# Patient Record
Sex: FEMALE | Race: WHITE | NOT HISPANIC OR LATINO | Employment: OTHER | URBAN - METROPOLITAN AREA
[De-identification: names, ages, dates, MRNs, and addresses within clinical notes are randomized per-mention and may not be internally consistent; named-entity substitution may affect disease eponyms.]

---

## 2017-10-19 DIAGNOSIS — Z12.31 ENCOUNTER FOR SCREENING MAMMOGRAM FOR MALIGNANT NEOPLASM OF BREAST: ICD-10-CM

## 2017-10-25 ENCOUNTER — HOSPITAL ENCOUNTER (OUTPATIENT)
Dept: MAMMOGRAPHY | Facility: HOSPITAL | Age: 62
Discharge: HOME | End: 2017-10-25
Attending: OBSTETRICS & GYNECOLOGY
Payer: COMMERCIAL

## 2017-10-25 DIAGNOSIS — Z12.31 ENCOUNTER FOR SCREENING MAMMOGRAM FOR MALIGNANT NEOPLASM OF BREAST: ICD-10-CM

## 2017-10-25 PROCEDURE — G0202 SCR MAMMO BI INCL CAD: HCPCS

## 2018-10-02 DIAGNOSIS — Z12.31 SCREENING MAMMOGRAM, ENCOUNTER FOR: Primary | ICD-10-CM

## 2018-12-13 ENCOUNTER — HOSPITAL ENCOUNTER (OUTPATIENT)
Dept: MAMMOGRAPHY | Facility: HOSPITAL | Age: 63
Discharge: HOME | End: 2018-12-13
Attending: OBSTETRICS & GYNECOLOGY
Payer: COMMERCIAL

## 2018-12-13 VITALS — HEIGHT: 61 IN | BODY MASS INDEX: 35.87 KG/M2 | WEIGHT: 190 LBS

## 2018-12-13 DIAGNOSIS — Z12.31 SCREENING MAMMOGRAM, ENCOUNTER FOR: ICD-10-CM

## 2018-12-13 PROCEDURE — 77067 SCR MAMMO BI INCL CAD: CPT

## 2019-08-12 ENCOUNTER — TELEPHONE (OUTPATIENT)
Dept: OBGYN CLINIC | Facility: CLINIC | Age: 64
End: 2019-08-12

## 2019-08-12 NOTE — TELEPHONE ENCOUNTER
They are usually w the doc  If you can schedule one w them when I am here and block my schedule I would be ok doing one w the doc, then on my own  I haven't done them in the office before, but am somewhat more confident in my suturing skills now if she needed a stitch or two

## 2019-08-13 NOTE — TELEPHONE ENCOUNTER
I rescheduled her for Monday 9/23 with you at 11 and held a spot in Dr Tripathi schedule at [phone]

## 2019-09-23 ENCOUNTER — OFFICE VISIT (OUTPATIENT)
Dept: OBGYN CLINIC | Facility: CLINIC | Age: 64
End: 2019-09-23
Payer: COMMERCIAL

## 2019-09-23 VITALS
WEIGHT: 193 LBS | SYSTOLIC BLOOD PRESSURE: 122 MMHG | BODY MASS INDEX: 36.44 KG/M2 | HEIGHT: 61 IN | DIASTOLIC BLOOD PRESSURE: 80 MMHG

## 2019-09-23 DIAGNOSIS — E28.39 HYPOESTROGENISM: ICD-10-CM

## 2019-09-23 DIAGNOSIS — Z12.39 BREAST CANCER SCREENING: ICD-10-CM

## 2019-09-23 DIAGNOSIS — Z01.419 WELL WOMAN EXAM: Primary | ICD-10-CM

## 2019-09-23 PROCEDURE — 99386 PREV VISIT NEW AGE 40-64: CPT | Performed by: PHYSICIAN ASSISTANT

## 2019-09-23 NOTE — PROGRESS NOTES
Assessment/Plan   Diagnoses and all orders for this visit:    Well woman exam  -     GP Liquid-Based Pap + HPV Plus    Hypoestrogenism  -     DXA bone density spine hip and pelvis; Future    Breast cancer screening  -     Mammo screening bilateral w 3d & cad; Future        Discussion    All questions have been answered to her satisfaction  RTO for APE or sooner if needed      Subjective     Pt for annual GYN exam  S/p hysterectomy  w BSO  Pt doing well without any menopausal sx  Denies hot flashes or  compalints  Pt does have 2 skin tags that she has had as long as she can remember that dont usually cause her any issues  A few weeks ago one of them was sore so she made an appt to have them removed but the irritation has since resolved and no longer symptomatic  Pt in monogamous relationship w partner  She recently retired a few years back and is enjoying life and travel  Toyin Quezada is a 58 y o  female who presents for annual well woman exam    LMP - years ago after her hysterectomy  No vulvar itch/burn; No vaginal itch/burn; No abn discharge or odor; No urinary sx - burning/pain/frequency/hematuria  (+) SBEs - no breast masses, asymmetry, nipple discharge or bleeding, changes in skin of breast, or breast tenderness bilaterally  No abd/pelvic pain or HAs; No menopausal symptoms: No hot flashes/night sweats, problems with intercourse, vaginal dryness; sleeping well  Pt is sexually active in a mutually monog/ sexual relationship; No issues with intercourse; She declines std/hiv/hep testing; Feels safe at home    (+) PCP for routine Bw/care- Dr Lyly Yu sees as PCP    Last Pap - 2015ish  History of abnormal Pap smear: none   Last mammo - 12/13/18 WNL  History of abnormal mammogram: none   Last colonoscopy - 2009  Last Dexa scan -2014     Review of Systems   Constitutional: Negative  Respiratory: Positive for wheezing (c/w asthma per pt)      Gastrointestinal: Positive for diarrhea (due to crohns)  Endocrine: Negative  Genitourinary: Negative          The following portions of the patient's history were reviewed and updated as appropriate: allergies, current medications, past family history, past medical history, past social history, past surgical history and problem list          OB History        1    Para   0    Term   0            AB   1    Living           SAB   1    TAB        Ectopic        Multiple        Live Births                     Past Medical History:   Diagnosis Date    Asthma     Crohn's disease ([facility] [address] )        Past Surgical History:   Procedure Laterality Date    HYSTERECTOMY      OOPHORECTOMY         Family History   Problem Relation Age of Onset    Coronary artery disease Mother     Diabetes Father     Pancreatic cancer Sister        Social History     Socioeconomic History    Marital status: /Civil Union     Spouse name: Not on file    Number of children: Not on file    Years of education: Not on file    Highest education level: Not on file   Occupational History    Not on file   Social Needs    Financial resource strain: Not on file    Food insecurity:     Worry: Not on file     Inability: Not on file    Transportation needs:     Medical: Not on file     Non-medical: Not on file   Tobacco Use    Smoking status: Never Smoker    Smokeless tobacco: Never Used   Substance and Sexual Activity    Alcohol use: Not Currently    Drug use: Not Currently    Sexual activity: Yes     Partners: Male     Birth control/protection: Post-menopausal   Lifestyle    Physical activity:     Days per week: Not on file     Minutes per session: Not on file    Stress: Not on file   Relationships    Social connections:     Talks on phone: Not on file     Gets together: Not on file     Attends Rastafarian service: Not on file     Active member of club or organization: Not on file     Attends meetings of clubs or organizations: Not on file     Relationship status: Not on file    Intimate partner violence:     Fear of current or ex partner: Not on file     Emotionally abused: Not on file     Physically abused: Not on file     Forced sexual activity: Not on file   Other Topics Concern    Not on file   Social History Narrative    Not on file       No current outpatient medications on file  Allergies not on file    Objective   Vitals:    09/23/19 1113   BP: 122/80   BP Location: Left arm   Patient Position: Sitting   Cuff Size: Standard   Weight: 87 5 kg (193 lb)   Height: 5' 1" (1 549 m)     Physical Exam   Constitutional: She is oriented to person, place, and time  She appears well-developed and well-nourished  HENT:   Head: Normocephalic and atraumatic  Cardiovascular: Normal rate and regular rhythm  Pulmonary/Chest: Effort normal and breath sounds normal  Right breast exhibits no inverted nipple, no mass, no nipple discharge, no skin change and no tenderness  Left breast exhibits no inverted nipple, no mass, no nipple discharge, no skin change and no tenderness  Breasts are symmetrical    Abdominal: Soft  She exhibits no distension and no mass  There is no rebound and no guarding  Genitourinary: Vagina normal and uterus normal        No vaginal discharge found  Neurological: She is alert and oriented to person, place, and time  Skin: Skin is warm and dry  Psychiatric: She has a normal mood and affect  Patient Instructions   Will watch skin tags for now  As it is no longer bothersome and is usually not will likely not plan removal at this point but rather just watch  Aware to call if desires removal    Pap done today  Mammo and DEXA ordered

## 2019-09-27 LAB
HPV HR 12 DNA CVX QL NAA+PROBE: NOT DETECTED
HPV16 DNA SPEC QL NAA+PROBE: NOT DETECTED
HPV18 DNA SPEC QL NAA+PROBE: NOT DETECTED
THIN PREP CVX: NORMAL

## 2019-10-15 ENCOUNTER — TELEPHONE (OUTPATIENT)
Dept: OBGYN CLINIC | Facility: CLINIC | Age: 64
End: 2019-10-15

## 2019-10-15 NOTE — TELEPHONE ENCOUNTER
Patient called letting us know we billed to the wrong insurance ID number  Patient has [facility]: 9/23/19 was billed to ID# [id number]  That visit should have been billed to the ID# [id number]  On the visit it looks like the new card was scanned into the system but not updated in coverage

## 2019-10-16 NOTE — TELEPHONE ENCOUNTER
An e-mail has been sent to [facility] for evaluation the the outstanding bill the patient received in the mail due to non matching ID numbers of active  insurance at time of the survive

## 2020-01-16 ENCOUNTER — TRANSCRIBE ORDERS (OUTPATIENT)
Dept: LAB | Facility: CLINIC | Age: 65
End: 2020-01-16

## 2020-01-16 ENCOUNTER — APPOINTMENT (OUTPATIENT)
Dept: LAB | Facility: CLINIC | Age: 65
End: 2020-01-16
Payer: COMMERCIAL

## 2020-01-16 DIAGNOSIS — E78.2 MIXED HYPERLIPIDEMIA: ICD-10-CM

## 2020-01-16 DIAGNOSIS — E78.2 MIXED HYPERLIPIDEMIA: Primary | ICD-10-CM

## 2020-01-16 LAB
ALBUMIN SERPL BCP-MCNC: 3.1 G/DL (ref 3.5–5)
ALP SERPL-CCNC: 137 U/L (ref 46–116)
ALT SERPL W P-5'-P-CCNC: 11 U/L (ref 12–78)
ANION GAP SERPL CALCULATED.3IONS-SCNC: 8 MMOL/L (ref 4–13)
AST SERPL W P-5'-P-CCNC: 14 U/L (ref 5–45)
BASOPHILS # BLD AUTO: 0.07 THOUSANDS/ΜL (ref 0–0.1)
BASOPHILS NFR BLD AUTO: 1 % (ref 0–1)
BILIRUB SERPL-MCNC: 0.36 MG/DL (ref 0.2–1)
BUN SERPL-MCNC: 10 MG/DL (ref 5–25)
CALCIUM SERPL-MCNC: 8.8 MG/DL (ref 8.3–10.1)
CHLORIDE SERPL-SCNC: 111 MMOL/L (ref 100–108)
CHOLEST SERPL-MCNC: 226 MG/DL (ref 50–200)
CO2 SERPL-SCNC: 25 MMOL/L (ref 21–32)
CREAT SERPL-MCNC: 0.65 MG/DL (ref 0.6–1.3)
EOSINOPHIL # BLD AUTO: 0.26 THOUSAND/ΜL (ref 0–0.61)
EOSINOPHIL NFR BLD AUTO: 3 % (ref 0–6)
ERYTHROCYTE [DISTWIDTH] IN BLOOD BY AUTOMATED COUNT: 14.5 % (ref 11.6–15.1)
GFR SERPL CREATININE-BSD FRML MDRD: 93 ML/MIN/1.73SQ M
GLUCOSE P FAST SERPL-MCNC: 85 MG/DL (ref 65–99)
HCT VFR BLD AUTO: 42.1 % (ref 34.8–46.1)
HDLC SERPL-MCNC: 46 MG/DL
HGB BLD-MCNC: 12.8 G/DL (ref 11.5–15.4)
IMM GRANULOCYTES # BLD AUTO: 0.02 THOUSAND/UL (ref 0–0.2)
IMM GRANULOCYTES NFR BLD AUTO: 0 % (ref 0–2)
LDLC SERPL CALC-MCNC: 148 MG/DL (ref 0–100)
LYMPHOCYTES # BLD AUTO: 3.64 THOUSANDS/ΜL (ref 0.6–4.47)
LYMPHOCYTES NFR BLD AUTO: 47 % (ref 14–44)
MCH RBC QN AUTO: 25.8 PG (ref 26.8–34.3)
MCHC RBC AUTO-ENTMCNC: 30.4 G/DL (ref 31.4–37.4)
MCV RBC AUTO: 85 FL (ref 82–98)
MONOCYTES # BLD AUTO: 0.58 THOUSAND/ΜL (ref 0.17–1.22)
MONOCYTES NFR BLD AUTO: 8 % (ref 4–12)
NEUTROPHILS # BLD AUTO: 3.19 THOUSANDS/ΜL (ref 1.85–7.62)
NEUTS SEG NFR BLD AUTO: 41 % (ref 43–75)
NONHDLC SERPL-MCNC: 180 MG/DL
NRBC BLD AUTO-RTO: 0 /100 WBCS
PLATELET # BLD AUTO: 355 THOUSANDS/UL (ref 149–390)
PMV BLD AUTO: 10 FL (ref 8.9–12.7)
POTASSIUM SERPL-SCNC: 3.8 MMOL/L (ref 3.5–5.3)
PROT SERPL-MCNC: 7.5 G/DL (ref 6.4–8.2)
RBC # BLD AUTO: 4.97 MILLION/UL (ref 3.81–5.12)
SODIUM SERPL-SCNC: 144 MMOL/L (ref 136–145)
T4 FREE SERPL-MCNC: 0.97 NG/DL (ref 0.76–1.46)
TRIGL SERPL-MCNC: 159 MG/DL
TSH SERPL DL<=0.05 MIU/L-ACNC: 3.9 UIU/ML (ref 0.36–3.74)
WBC # BLD AUTO: 7.76 THOUSAND/UL (ref 4.31–10.16)

## 2020-01-16 PROCEDURE — 80053 COMPREHEN METABOLIC PANEL: CPT

## 2020-01-16 PROCEDURE — 84443 ASSAY THYROID STIM HORMONE: CPT

## 2020-01-16 PROCEDURE — 80061 LIPID PANEL: CPT

## 2020-01-16 PROCEDURE — 84439 ASSAY OF FREE THYROXINE: CPT

## 2020-01-16 PROCEDURE — 36415 COLL VENOUS BLD VENIPUNCTURE: CPT

## 2020-01-16 PROCEDURE — 85025 COMPLETE CBC W/AUTO DIFF WBC: CPT

## 2020-02-11 ENCOUNTER — HOSPITAL ENCOUNTER (OUTPATIENT)
Dept: RADIOLOGY | Age: 65
Discharge: HOME | End: 2020-02-11
Payer: COMMERCIAL

## 2020-02-11 VITALS — HEIGHT: 61 IN | BODY MASS INDEX: 35.87 KG/M2 | WEIGHT: 190 LBS

## 2020-02-11 DIAGNOSIS — Z12.39 BREAST CANCER SCREENING: ICD-10-CM

## 2020-02-11 DIAGNOSIS — E28.39 HYPOESTROGENISM: ICD-10-CM

## 2020-02-11 PROCEDURE — 77067 SCR MAMMO BI INCL CAD: CPT

## 2020-02-11 PROCEDURE — 77080 DXA BONE DENSITY AXIAL: CPT

## 2020-02-11 PROCEDURE — 77063 BREAST TOMOSYNTHESIS BI: CPT

## 2020-02-25 PROBLEM — Z87.19 HISTORY OF COLITIS: Status: ACTIVE | Noted: 2020-02-25

## 2020-02-25 PROBLEM — K29.60 REFLUX GASTRITIS: Status: ACTIVE | Noted: 2020-02-25

## 2020-02-25 PROBLEM — Z12.11 ENCOUNTER FOR SCREENING COLONOSCOPY: Status: ACTIVE | Noted: 2020-02-25

## 2020-08-26 ENCOUNTER — ANESTHESIA EVENT (OUTPATIENT)
Dept: GASTROENTEROLOGY | Facility: AMBULARY SURGERY CENTER | Age: 65
End: 2020-08-26

## 2020-09-09 ENCOUNTER — ANESTHESIA (OUTPATIENT)
Dept: GASTROENTEROLOGY | Facility: AMBULARY SURGERY CENTER | Age: 65
End: 2020-09-09

## 2020-09-09 ENCOUNTER — HOSPITAL ENCOUNTER (OUTPATIENT)
Dept: GASTROENTEROLOGY | Facility: AMBULARY SURGERY CENTER | Age: 65
Setting detail: OUTPATIENT SURGERY
Discharge: HOME | End: 2020-09-09
Attending: COLON & RECTAL SURGERY | Admitting: COLON & RECTAL SURGERY
Payer: COMMERCIAL

## 2020-09-09 VITALS
RESPIRATION RATE: 18 BRPM | SYSTOLIC BLOOD PRESSURE: 124 MMHG | WEIGHT: 192 LBS | OXYGEN SATURATION: 95 % | HEART RATE: 64 BPM | DIASTOLIC BLOOD PRESSURE: 58 MMHG | BODY MASS INDEX: 36.25 KG/M2 | HEIGHT: 61 IN | TEMPERATURE: 97.8 F

## 2020-09-09 VITALS — HEART RATE: 68 BPM

## 2020-09-09 DIAGNOSIS — K29.60 REFLUX GASTRITIS: ICD-10-CM

## 2020-09-09 DIAGNOSIS — Z12.11 ENCOUNTER FOR SCREENING COLONOSCOPY: ICD-10-CM

## 2020-09-09 DIAGNOSIS — Z87.19 HISTORY OF COLITIS: ICD-10-CM

## 2020-09-09 PROCEDURE — 45385 COLONOSCOPY W/LESION REMOVAL: CPT | Performed by: COLON & RECTAL SURGERY

## 2020-09-09 PROCEDURE — 45385 COLONOSCOPY W/LESION REMOVAL: CPT | Performed by: INTERNAL MEDICINE

## 2020-09-09 PROCEDURE — 88305 TISSUE EXAM BY PATHOLOGIST: CPT | Performed by: PATHOLOGY

## 2020-09-09 PROCEDURE — 43239 EGD BIOPSY SINGLE/MULTIPLE: CPT | Performed by: INTERNAL MEDICINE

## 2020-09-09 RX ORDER — LIDOCAINE HYDROCHLORIDE 10 MG/ML
INJECTION, SOLUTION EPIDURAL; INFILTRATION; INTRACAUDAL; PERINEURAL AS NEEDED
Status: DISCONTINUED | OUTPATIENT
Start: 2020-09-09 | End: 2020-09-09

## 2020-09-09 RX ORDER — SODIUM CHLORIDE, SODIUM LACTATE, POTASSIUM CHLORIDE, CALCIUM CHLORIDE 600; 310; 30; 20 MG/100ML; MG/100ML; MG/100ML; MG/100ML
50 INJECTION, SOLUTION INTRAVENOUS CONTINUOUS
Status: DISCONTINUED | OUTPATIENT
Start: 2020-09-09 | End: 2020-09-13 | Stop reason: HOSPADM

## 2020-09-09 RX ORDER — LIDOCAINE HYDROCHLORIDE 10 MG/ML
0.5 INJECTION, SOLUTION EPIDURAL; INFILTRATION; INTRACAUDAL; PERINEURAL ONCE AS NEEDED
Status: DISCONTINUED | OUTPATIENT
Start: 2020-09-09 | End: 2020-09-13 | Stop reason: HOSPADM

## 2020-09-09 RX ORDER — PROPOFOL 10 MG/ML
INJECTION, EMULSION INTRAVENOUS CONTINUOUS PRN
Status: DISCONTINUED | OUTPATIENT
Start: 2020-09-09 | End: 2020-09-09

## 2020-09-09 RX ORDER — PROPOFOL 10 MG/ML
INJECTION, EMULSION INTRAVENOUS AS NEEDED
Status: DISCONTINUED | OUTPATIENT
Start: 2020-09-09 | End: 2020-09-09

## 2020-09-09 RX ADMIN — PROPOFOL 100 MCG/KG/MIN: 10 INJECTION, EMULSION INTRAVENOUS at 08:13

## 2020-09-09 RX ADMIN — PROPOFOL 110 MG: 10 INJECTION, EMULSION INTRAVENOUS at 08:13

## 2020-09-09 RX ADMIN — SODIUM CHLORIDE, SODIUM LACTATE, POTASSIUM CHLORIDE, AND CALCIUM CHLORIDE: .6; .31; .03; .02 INJECTION, SOLUTION INTRAVENOUS at 08:04

## 2020-09-09 RX ADMIN — LIDOCAINE HYDROCHLORIDE 50 MG: 10 INJECTION, SOLUTION EPIDURAL; INFILTRATION; INTRACAUDAL; PERINEURAL at 08:13

## 2020-09-09 NOTE — ANESTHESIA PREPROCEDURE EVALUATION
Procedure:  COLONOSCOPY  EGD    Relevant Problems   ANESTHESIA (within normal limits)      NEURO/PSYCH   (+) History of colitis      Past Medical History:   Diagnosis Date    Asthma     Colon polyp     Crohn's disease ([facility] [address] )        Physical Exam    Airway    Mallampati score: II  TM Distance: >3 FB  Neck ROM: full     Dental       Cardiovascular  Cardiovascular exam normal    Pulmonary  Pulmonary exam normal     Other Findings        Anesthesia Plan  ASA Score- 2     Anesthesia Type- IV sedation with anesthesia with ASA Monitors  Additional Monitors:   Airway Plan:     Comment: I, Brandee Yip MD, discussed risks (reviewed with patient on the anesthesia consent form), benefits and alternatives of monitored sedation including the possibility under sedation to have recall or mild discomfort          Plan Factors-    Chart reviewed  Patient summary reviewed  Induction- intravenous  Postoperative Plan-     Informed Consent- Anesthetic plan and risks discussed with patient  I personally reviewed this patient with the CRNA  Discussed and agreed on the Anesthesia Plan with the CRNA  Fariba Cruz

## 2020-09-09 NOTE — H&P
History and Physical   Colon and Rectal Surgery   Shakira Salinas 71 y o  female MRN: [id number]  Unit/Bed#:  Encounter: [id number]  09/09/20   7:56 AM      No chief complaint on file  ASSESSMENT:   Question of Crohn's disease on past history, discussed with her will plan on routine colonoscopy/EGD, but would hold additional workup unless symptoms dictate as at least a decade off mesalamine and no problems      Average risk screening colonoscopy     Screening colonoscopy,discussed in a face-to-face, personal, informed consent process, the benefits, alternatives, risks including not limited to bleeding, missed lesion, perforation requiring emergent surgery discussed/understood      PLAN:  Colonoscopy scheduled, will coordinate with EGD with GI colleagues for ongoing reflux/GERD, stomach polyp history         HPI  Shakira Salinas is a 71 y o  female  Is here today for evaluation of Crohn's disease  She is currently not taking any medications  In the past she was on asacol       Diagnosed mid 46s with Crohn's, states hospital admissions for pain, no obstructions, had been on prednisone remotely around that time, I have reviewed colonoscopy/biopsies to [address] without evidence of active IBD on endoscopy or biopsies  She had 2005 EGD last with multiple fundic polyps      She reports she has generalized abdominal cramping that is relieved with a bowel movement  She fluctuates between diarrhea and soft and formed bowel movements  She denies any fecal incontinence  She denies rectal bleeding  On average she has 3 bowel movements daily       Her most recent colonoscopy was on 4/28/14 with Dr Shimon Redmond which was essentially unremarkable, suspected lymphoid hyperplasia of the transverse colon       She denies family history of colorectal cancer    Historical Information   Past Medical History:   Diagnosis Date    Asthma     Colon polyp     Crohn's disease [facility])      Past Surgical History:   Procedure Laterality Date    COLONOSCOPY      GASTRIC BYPASS LAPAROSCOPIC      HYSTERECTOMY      OOPHORECTOMY         Meds/Allergies     (Not in a hospital admission)      No current outpatient medications on file      Current Facility-Administered Medications:     lactated ringers infusion, 50 mL/hr, Intravenous, Continuous, Birtha Siemens, MD    lidocaine (PF) (XYLOCAINE-MPF) 1 % injection 0 5 mL, 0 5 mL, Infiltration, Once PRN, Birtha Siemens, MD    Allergies   Allergen Reactions    Biaxin [Clarithromycin] Rash         Social History   Social History     Substance and Sexual Activity   Alcohol Use Not Currently     Social History     Substance and Sexual Activity   Drug Use Not Currently     Social History     Tobacco Use   Smoking Status Never Smoker   Smokeless Tobacco Never Used         Family History:   Family History   Problem Relation Age of Onset    Coronary artery disease Mother     Diabetes Father     Pancreatic cancer Sister 46    No Known Problems Maternal Grandmother     No Known Problems Maternal Grandfather     No Known Problems Paternal Grandmother     No Known Problems Paternal Grandfather     No Known Problems Sister     No Known Problems Maternal Aunt     No Known Problems Maternal Aunt     No Known Problems Maternal Aunt     No Known Problems Maternal Aunt     No Known Problems Maternal Aunt     No Known Problems Maternal Aunt     No Known Problems Paternal Aunt     No Known Problems Paternal Aunt     Breast cancer Cousin     Breast cancer Cousin 48    Breast cancer Cousin 76    Colon cancer Neg Hx          Objective     Current Vitals:   Blood Pressure: 139/75 (09/09/20 0741)  Pulse: 75 (09/09/20 0741)  Temperature: 97 5 °F (36 4 °C) (09/09/20 0741)  Temp Source: Temporal (09/09/20 0741)  Respirations: (!) 28 (09/09/20 0741)  Height: 5' 1" (154 9 cm) (09/09/20 0741)  Weight - Scale: 87 1 kg (192 lb) (09/09/20 0741)  SpO2: 99 % (09/09/20 0741)  No intake or output data in the 24 hours ending 09/09/20 8424    Physical Exam:  General:no distress  Eyes:perrla/eomi  ENT:moist mucus membranes  Neck:supple  Pulm:no increased work of breathing  CV:sinus  Abdomen:soft,nontender

## 2020-09-09 NOTE — ANESTHESIA POSTPROCEDURE EVALUATION
Post-Op Assessment Note    CV Status:  Stable    Pain management: adequate     Mental Status:  Alert and awake   Hydration Status:  Euvolemic   PONV Controlled:  Controlled   Airway Patency:  Patent      Post Op Vitals Reviewed: Yes      Staff: Anesthesiologist, CRNA         No complications documented

## 2020-12-03 ENCOUNTER — OFFICE VISIT (OUTPATIENT)
Dept: BARIATRICS | Facility: CLINIC | Age: 65
End: 2020-12-03
Payer: COMMERCIAL

## 2020-12-03 VITALS
DIASTOLIC BLOOD PRESSURE: 70 MMHG | WEIGHT: 199 LBS | TEMPERATURE: 97.2 F | BODY MASS INDEX: 36.62 KG/M2 | SYSTOLIC BLOOD PRESSURE: 138 MMHG | HEIGHT: 62 IN | HEART RATE: 85 BPM

## 2020-12-03 DIAGNOSIS — Z98.84 S/P GASTRIC BYPASS: Primary | ICD-10-CM

## 2020-12-03 DIAGNOSIS — E66.9 OBESITY, CLASS II, BMI 35-39.9: Primary | ICD-10-CM

## 2020-12-03 DIAGNOSIS — Z98.84 S/P GASTRIC BYPASS: ICD-10-CM

## 2020-12-03 DIAGNOSIS — K91.2 POSTSURGICAL MALABSORPTION: ICD-10-CM

## 2020-12-03 PROBLEM — E66.812 OBESITY, CLASS II, BMI 35-39.9: Status: ACTIVE | Noted: 2020-12-03

## 2020-12-03 PROCEDURE — 99203 OFFICE O/P NEW LOW 30 MIN: CPT | Performed by: SURGERY

## 2020-12-03 RX ORDER — OMEPRAZOLE 20 MG/1
20 CAPSULE, DELAYED RELEASE ORAL DAILY
Qty: 30 CAPSULE | Refills: 3 | Status: SHIPPED | OUTPATIENT
Start: 2020-12-03 | End: 2021-01-20

## 2020-12-03 RX ORDER — DIPHENOXYLATE HYDROCHLORIDE AND ATROPINE SULFATE 2.5; .025 MG/1; MG/1
1 TABLET ORAL DAILY
COMMUNITY

## 2020-12-14 ENCOUNTER — HOSPITAL ENCOUNTER (OUTPATIENT)
Dept: RADIOLOGY | Facility: HOSPITAL | Age: 65
Discharge: HOME | End: 2020-12-14
Payer: COMMERCIAL

## 2020-12-14 DIAGNOSIS — Z98.84 S/P GASTRIC BYPASS: ICD-10-CM

## 2020-12-14 PROCEDURE — 74240 X-RAY XM UPR GI TRC 1CNTRST: CPT

## 2021-01-07 ENCOUNTER — OFFICE VISIT (OUTPATIENT)
Dept: BARIATRICS | Facility: CLINIC | Age: 66
End: 2021-01-07
Payer: COMMERCIAL

## 2021-01-07 VITALS
SYSTOLIC BLOOD PRESSURE: 124 MMHG | TEMPERATURE: 97.7 F | BODY MASS INDEX: 36.16 KG/M2 | RESPIRATION RATE: 20 BRPM | HEIGHT: 62 IN | WEIGHT: 196.5 LBS | HEART RATE: 60 BPM | DIASTOLIC BLOOD PRESSURE: 78 MMHG

## 2021-01-07 DIAGNOSIS — R13.10 DYSPHAGIA: ICD-10-CM

## 2021-01-07 DIAGNOSIS — Z98.84 S/P GASTRIC BYPASS: Primary | ICD-10-CM

## 2021-01-07 PROCEDURE — 99214 OFFICE O/P EST MOD 30 MIN: CPT | Performed by: SURGERY

## 2021-01-07 NOTE — PROGRESS NOTES
BARIATRIC HISTORY AND PHYSICAL - BARIATRIC SURGERY  Luci Galvan 76 y o  female MRN: [id number]  Unit/Bed#:  Encounter: [id number]      HPI:  Luci Galvan is a 76 y o  female who presents to the office status post laparoscopic Doug-en-Y gastric bypass done at [facility] in 2006  Patient has been having acid reflux, food regurgitation, food stuck  She she has been on omeprazole since the last visit, she reports complete resolution of her acid reflux  She still reports food regurgitation and dysphagia, has been twice weekly  She had upper GI endoscopy back in September that was normal   She also has had an upper GI study and month ago which did show small hiatal hernia  With no evidence of obstruction  Initial weight was 230 lb,  Marcin 140 lb,  Current 196 5 lb, with BMI of 36 5    Review of Systems   Constitutional: Negative for chills and fatigue  HENT: Negative for ear pain  Eyes: Negative for pain  Respiratory: Negative for cough, shortness of breath and wheezing  Cardiovascular: Negative for chest pain  Gastrointestinal: Negative for abdominal distention and abdominal pain  Acid reflux(resolved)  , food regurgitation, food stuck   Endocrine: Negative for polydipsia  Genitourinary: Negative for flank pain  Musculoskeletal: Negative for arthralgias and back pain  Skin: Negative for pallor  Allergic/Immunologic: Negative for food allergies  Neurological: Negative for weakness and headaches  Hematological: Does not bruise/bleed easily  Psychiatric/Behavioral: Negative for confusion         Historical Information   Past Medical History:   Diagnosis Date    Asthma     Colon polyp     Crohn's disease ([facility] [address] )      Past Surgical History:   Procedure Laterality Date    COLONOSCOPY      GASTRIC BYPASS LAPAROSCOPIC      HYSTERECTOMY      OOPHORECTOMY       Social History   Social History     Substance and Sexual Activity   Alcohol Use Not Currently     Social History Substance and Sexual Activity   Drug Use Not Currently     Social History     Tobacco Use   Smoking Status Never Smoker   Smokeless Tobacco Never Used     Family History: non-contributory    Meds/Allergies   all medications and allergies reviewed  Allergies   Allergen Reactions    Biaxin [Clarithromycin] Rash       Objective     Current Vitals:   Blood Pressure: 124/78 (01/07/21 1109)  Pulse: 60 (01/07/21 1109)  Temperature: 97 7 °F (36 5 °C) (01/07/21 1109)  Temp Source: Tympanic (01/07/21 1109)  Respirations: 20 (01/07/21 1109)  Height: 5' 1 5" (156 2 cm) (01/07/21 1109)  Weight - Scale: 89 1 kg (196 lb 8 oz) (01/07/21 1109)  bowel movement  [unfilled]    Invasive Devices     None                 Physical Exam  Constitutional:       Appearance: Normal appearance  HENT:      Head: Normocephalic  Mouth/Throat:      Mouth: Mucous membranes are moist    Eyes:      Conjunctiva/sclera: Conjunctivae normal       Pupils: Pupils are equal, round, and reactive to light  Neck:      Musculoskeletal: Normal range of motion  Cardiovascular:      Rate and Rhythm: Normal rate and regular rhythm  Pulmonary:      Effort: Pulmonary effort is normal    Abdominal:      General: Abdomen is flat  There is no distension  Tenderness: There is no abdominal tenderness  Comments: All laparoscopic incisions are healing well, with no signs of infection  There are no palpable incisions hernias  Deep palpation is not painful in all 4 quadrants, and there are no peritoneal findings  Musculoskeletal: Normal range of motion  Skin:     General: Skin is warm  Capillary Refill: Capillary refill takes less than 2 seconds  Neurological:      General: No focal deficit present  Mental Status: She is alert  Psychiatric:         Mood and Affect: Mood normal          Lab Results: I have personally reviewed pertinent lab results  Imaging: I have personally reviewed pertinent reports      EKG, Pathology, and Other Studies: I have personally reviewed pertinent reports  UGI 12/14  IMPRESSION:     Status post Doug-en-Y gastric bypass surgery, with small gastric remnant at least partially above the level of the diaphragm      Esophagoesophageal and gastroesophageal reflux noted     Tertiary contractions of distal esophagus     Code Status: [unfilled]  Advance Directive and Living Will:      Power of :    POLST:      76 y o  female status post laparoscopic Doug-en-Y gastric bypass performed in 2006 at [facility] by Dr Vu Hawthorne      At her highest weight she was 230 lbs and after the surgery she went down to a saqib of 140 lbs within the first year  Over the last 7 years she has regained about 60 lbs back      She tells me that she cannot eat a lot of food and she continues to be restricted in the volume that she can have but she has been favoring high caloric foods        She recently had an endoscopy in September of this year by Dr Catie VARGAS that revealed a relatively normal gastric bypass anatomy  An upper GI did show a small hernia  Patient to to increase omeprazole to twice daily  The patient follow-up with medical weight management, dietitian   We do believe that she does not have any anatomical cause of her symptoms, she will benefit from medical management      If her symptoms persist will schedule her for upper GI endoscopy      Saida Khoury MD  Bariatric Surgery Fellow  1/7/2021  11:35 AM

## 2021-01-20 DIAGNOSIS — Z98.84 S/P GASTRIC BYPASS: ICD-10-CM

## 2021-01-20 RX ORDER — OMEPRAZOLE 20 MG/1
CAPSULE, DELAYED RELEASE ORAL
Qty: 30 CAPSULE | Refills: 3 | Status: SHIPPED | OUTPATIENT
Start: 2021-01-20 | End: 2021-05-17 | Stop reason: SDUPTHER

## 2021-02-05 ENCOUNTER — TELEPHONE (OUTPATIENT)
Dept: OBGYN CLINIC | Facility: CLINIC | Age: 66
End: 2021-02-05

## 2021-02-05 DIAGNOSIS — Z12.31 ENCOUNTER FOR SCREENING MAMMOGRAM FOR BREAST CANCER: Primary | ICD-10-CM

## 2021-02-13 DIAGNOSIS — Z23 ENCOUNTER FOR IMMUNIZATION: ICD-10-CM

## 2021-03-01 PROBLEM — K64.5 THROMBOSED EXTERNAL HEMORRHOID: Status: ACTIVE | Noted: 2021-03-01

## 2021-03-03 ENCOUNTER — IMMUNIZATIONS (OUTPATIENT)
Dept: FAMILY MEDICINE CLINIC | Facility: HOSPITAL | Age: 66
End: 2021-03-03

## 2021-03-03 DIAGNOSIS — Z23 ENCOUNTER FOR IMMUNIZATION: Primary | ICD-10-CM

## 2021-03-03 PROCEDURE — 0001A SARS-COV-2 / COVID-19 MRNA VACCINE (PFIZER-BIONTECH) 30 MCG: CPT

## 2021-03-03 PROCEDURE — 91300 SARS-COV-2 / COVID-19 MRNA VACCINE (PFIZER-BIONTECH) 30 MCG: CPT

## 2021-03-25 ENCOUNTER — IMMUNIZATIONS (OUTPATIENT)
Dept: FAMILY MEDICINE CLINIC | Facility: HOSPITAL | Age: 66
End: 2021-03-25

## 2021-03-25 DIAGNOSIS — Z23 ENCOUNTER FOR IMMUNIZATION: Primary | ICD-10-CM

## 2021-03-25 PROCEDURE — 0002A SARS-COV-2 / COVID-19 MRNA VACCINE (PFIZER-BIONTECH) 30 MCG: CPT

## 2021-03-25 PROCEDURE — 91300 SARS-COV-2 / COVID-19 MRNA VACCINE (PFIZER-BIONTECH) 30 MCG: CPT

## 2021-05-17 DIAGNOSIS — Z98.84 S/P GASTRIC BYPASS: ICD-10-CM

## 2021-05-17 RX ORDER — OMEPRAZOLE 20 MG/1
CAPSULE, DELAYED RELEASE ORAL
Qty: 90 CAPSULE | Refills: 1 | Status: SHIPPED | OUTPATIENT
Start: 2021-05-17 | End: 2021-11-07 | Stop reason: SDUPTHER

## 2021-05-27 ENCOUNTER — HOSPITAL ENCOUNTER (OUTPATIENT)
Dept: RADIOLOGY | Age: 66
Discharge: HOME | End: 2021-05-27
Payer: COMMERCIAL

## 2021-05-27 VITALS — BODY MASS INDEX: 37 KG/M2 | WEIGHT: 196 LBS | HEIGHT: 61 IN

## 2021-05-27 DIAGNOSIS — Z12.31 ENCOUNTER FOR SCREENING MAMMOGRAM FOR BREAST CANCER: ICD-10-CM

## 2021-05-27 PROCEDURE — 77063 BREAST TOMOSYNTHESIS BI: CPT

## 2021-05-27 PROCEDURE — 77067 SCR MAMMO BI INCL CAD: CPT

## 2021-11-07 DIAGNOSIS — Z98.84 S/P GASTRIC BYPASS: ICD-10-CM

## 2021-11-09 RX ORDER — OMEPRAZOLE 20 MG/1
20 CAPSULE, DELAYED RELEASE ORAL DAILY
Qty: 30 CAPSULE | Refills: 0 | Status: SHIPPED | OUTPATIENT
Start: 2021-11-09 | End: 2022-04-07

## 2021-12-16 ENCOUNTER — OFFICE VISIT (OUTPATIENT)
Dept: GASTROENTEROLOGY | Facility: CLINIC | Age: 66
End: 2021-12-16
Payer: COMMERCIAL

## 2021-12-16 ENCOUNTER — TELEPHONE (OUTPATIENT)
Dept: GASTROENTEROLOGY | Facility: CLINIC | Age: 66
End: 2021-12-16

## 2021-12-16 VITALS
SYSTOLIC BLOOD PRESSURE: 118 MMHG | DIASTOLIC BLOOD PRESSURE: 80 MMHG | HEART RATE: 74 BPM | BODY MASS INDEX: 37 KG/M2 | WEIGHT: 196 LBS | HEIGHT: 61 IN

## 2021-12-16 DIAGNOSIS — K44.9 HIATAL HERNIA: ICD-10-CM

## 2021-12-16 DIAGNOSIS — Z98.84 STATUS POST BARIATRIC SURGERY: ICD-10-CM

## 2021-12-16 DIAGNOSIS — R13.19 ESOPHAGEAL DYSPHAGIA: Primary | ICD-10-CM

## 2021-12-16 PROCEDURE — 99204 OFFICE O/P NEW MOD 45 MIN: CPT | Performed by: INTERNAL MEDICINE

## 2021-12-16 RX ORDER — ROSUVASTATIN CALCIUM 10 MG/1
TABLET, COATED ORAL
COMMUNITY
Start: 2021-11-29

## 2021-12-16 NOTE — H&P (VIEW-ONLY)
[facility] - Outpatient Consultation  Anjelica Garvin 78 y o  female MRN: [id number]  Encounter: [id number]          ASSESSMENT AND PLAN:      1  Esophageal dysphagia   patient is complaining of difficulty in swallowing  She is having trouble swallowing bread, meat and poultry  Patient is status post bariatric surgery  She had Doug-en-Y bypass surgery  She is having difficulty swallowing  She did have a barium swallow performed several months ago  I have reviewed the barium swallow images on PACS  The history is small hiatal hernia and some holdup of the barium in the lower esophagus near the EG junction  There is a possibility of a Schatzki ring or esophageal spasm  Endoscopy will be done for further evaluation and dilatation  I had a long discussion with patient regarding this  Possibility of esophageal dysmotility cannot be excluded  Disorder such as cough school esophagus or other motility problem cannot be excluded  Patient had a colonoscopy done in 2020     - EGD; Future    2  Hiatal hernia    She does have possible hiatal hernia as shown on upper GI series  Endoscopy will be done for evaluation and recommendations  3  Status post bariatric surgery    Doug-en-Y bariatric surgery as mentioned  Currently patient is having significant difficulty swallowing  Food is regurgitating back     ______________________________________________________________________    HPI:    Difficulty swallowing  Food is regurgitating back  She is having more trouble with meat and bread  Denies any significant weight loss  There is no change in bowel habits  REVIEW OF SYSTEMS:    CONSTITUTIONAL: Denies any fever, chills, rigors, and weight loss  HEENT: No earache or tinnitus  Denies hearing loss or visual disturbances  CARDIOVASCULAR: No chest pain or palpitations  RESPIRATORY: Denies any cough, hemoptysis, shortness of breath or dyspnea on exertion    GASTROINTESTINAL: As noted in the History of Present Illness  GENITOURINARY: No problems with urination  Denies any hematuria or dysuria  NEUROLOGIC: No dizziness or vertigo, denies headaches  MUSCULOSKELETAL: Denies any muscle or joint pain  SKIN: Denies skin rashes or itching  ENDOCRINE: Denies excessive thirst  Denies intolerance to heat or cold  PSYCHOSOCIAL: Denies depression or anxiety  Denies any recent memory loss         Historical Information   Past Medical History:   Diagnosis Date    Asthma     Colon polyp     Crohn's disease ([address] )      Past Surgical History:   Procedure Laterality Date    COLONOSCOPY      GASTRIC BYPASS LAPAROSCOPIC      HYSTERECTOMY Bilateral     38years old    OOPHORECTOMY Bilateral     38years old     Social History   Social History     Substance and Sexual Activity   Alcohol Use Not Currently     Social History     Substance and Sexual Activity   Drug Use Not Currently     Social History     Tobacco Use   Smoking Status Never Smoker   Smokeless Tobacco Never Used     Family History   Problem Relation Age of Onset    Coronary artery disease Mother     Diabetes Father     Pancreatic cancer Sister 46    No Known Problems Maternal Grandmother     No Known Problems Maternal Grandfather     No Known Problems Paternal Grandmother     No Known Problems Paternal Grandfather     No Known Problems Sister     No Known Problems Maternal Aunt     No Known Problems Maternal Aunt     No Known Problems Maternal Aunt     No Known Problems Maternal Aunt     No Known Problems Maternal Aunt     No Known Problems Maternal Aunt     No Known Problems Paternal Aunt     No Known Problems Paternal Aunt     Breast cancer Cousin     Breast cancer Cousin 48    Breast cancer Cousin 76    Colon cancer Neg Hx        Meds/Allergies       Current Outpatient Medications:     rosuvastatin (CRESTOR) 10 MG tablet    multivitamin (THERAGRAN) TABS    Omega-3 Fatty Acids (FISH OIL PO)    omeprazole (PriLOSEC) 20 mg delayed release capsule    Allergies   Allergen Reactions    Biaxin [Clarithromycin] Rash           Objective     Blood pressure 118/80, pulse 74, height 5' 1" (1 549 m), weight 88 9 kg (196 lb)  Body mass index is 37 03 kg/m²  PHYSICAL EXAM:      General Appearance:   Alert, cooperative, no distress   HEENT:   Normocephalic, atraumatic, anicteric      Neck:  Supple, symmetrical, trachea midline   Lungs:   Clear to auscultation bilaterally; no rales, rhonchi or wheezing; respirations unlabored    Heart[de-identified]   Regular rate and rhythm; no murmur, rub, or gallop  Abdomen:   Soft, non-tender, non-distended; normal bowel sounds; no masses, no organomegaly    Genitalia:   Deferred    Rectal:   Deferred    Extremities:  No cyanosis, clubbing or edema    Pulses:  2+ and symmetric    Skin:  No jaundice, rashes, or lesions    Lymph nodes:  No palpable cervical lymphadenopathy        Lab Results:   No visits with results within 1 Day(s) from this visit  Latest known visit with results is:   Hospital Outpatient Visit on 09/09/2020   Component Date Value    Case Report 09/09/2020                      Value:Surgical Pathology Report                         Case: [id number]                                   Authorizing Provider:  Gilson Squires MD          Collected:           09/09/2020 0816              Ordering Location:     [facility]        Received:            09/09/2020 [address] [facility] Endoscopy                                                           Pathologist:           Frances Conrad,                                                      Specimens:   A) - Polyp, Stomach/Small Intestine, Bx Gastric polyp                                               B) - Polyp, Colorectal, Cold snare ascending polypectomy                                   Final Diagnosis 09/09/2020                      Value: This result contains rich text formatting which cannot be displayed here   Note 09/09/2020                      Value: This result contains rich text formatting which cannot be displayed here   Additional Information 09/09/2020                      Value: This result contains rich text formatting which cannot be displayed here   Synoptic Checklist 09/09/2020                      Value:  (COLON/RECTUM POLYP FORM - GI - All Specimens)                                                                                                                 : Adenoma(s)     Zara Bears Description 09/09/2020                      Value: This result contains rich text formatting which cannot be displayed here   Case Report 09/09/2020                      Value:Surgical Pathology Report                         Case: [id number]                                   Authorizing Provider:  Shakir Yuan MD          Collected:           09/09/2020 0816              Ordering Location:     [facility]        Received:            09/09/2020 [address] [facility]                                                           Pathologist:           Trini Tanner DO                                                     Specimens:   A) - Polyp, Stomach/Small Intestine, Bx Gastric polyp                                               B) - Polyp, Colorectal, Cold snare ascending polypectomy                                   Final Diagnosis 09/09/2020                      Value: This result contains rich text formatting which cannot be displayed here   Note 09/09/2020                      Value: This result contains rich text formatting which cannot be displayed here   Additional Information 09/09/2020                      Value: This result contains rich text formatting which cannot be displayed here      Synoptic Checklist 09/09/2020                      Value:  (COLON/RECTUM POLYP FORM - GI - All Specimens) Radha Villa Description 09/09/2020                      Value: This result contains rich text formatting which cannot be displayed here  Radiology Results:   No results found

## 2022-01-09 ENCOUNTER — IMMUNIZATIONS (OUTPATIENT)
Dept: FAMILY MEDICINE CLINIC | Facility: HOSPITAL | Age: 67
End: 2022-01-09

## 2022-01-09 DIAGNOSIS — Z23 ENCOUNTER FOR IMMUNIZATION: Primary | ICD-10-CM

## 2022-01-09 PROCEDURE — 91300 COVID-19 PFIZER VACC 0.3 ML: CPT

## 2022-01-09 PROCEDURE — 0001A COVID-19 PFIZER VACC 0.3 ML: CPT

## 2022-01-10 ENCOUNTER — TELEPHONE (OUTPATIENT)
Dept: GASTROENTEROLOGY | Facility: HOSPITAL | Age: 67
End: 2022-01-10

## 2022-01-11 ENCOUNTER — PREP FOR PROCEDURE (OUTPATIENT)
Dept: GASTROENTEROLOGY | Facility: CLINIC | Age: 67
End: 2022-01-11

## 2022-01-11 ENCOUNTER — ANESTHESIA EVENT (OUTPATIENT)
Dept: GASTROENTEROLOGY | Facility: HOSPITAL | Age: 67
End: 2022-01-11

## 2022-01-11 ENCOUNTER — ANESTHESIA (OUTPATIENT)
Dept: GASTROENTEROLOGY | Facility: HOSPITAL | Age: 67
End: 2022-01-11

## 2022-01-11 ENCOUNTER — HOSPITAL ENCOUNTER (OUTPATIENT)
Dept: GASTROENTEROLOGY | Facility: HOSPITAL | Age: 67
Setting detail: OUTPATIENT SURGERY
Discharge: HOME | End: 2022-01-11
Attending: INTERNAL MEDICINE | Admitting: INTERNAL MEDICINE
Payer: COMMERCIAL

## 2022-01-11 VITALS
TEMPERATURE: 98.7 F | RESPIRATION RATE: 18 BRPM | HEART RATE: 75 BPM | OXYGEN SATURATION: 98 % | WEIGHT: 196.2 LBS | DIASTOLIC BLOOD PRESSURE: 56 MMHG | HEIGHT: 62 IN | BODY MASS INDEX: 36.1 KG/M2 | SYSTOLIC BLOOD PRESSURE: 110 MMHG

## 2022-01-11 DIAGNOSIS — R13.19 ESOPHAGEAL DYSPHAGIA: Primary | ICD-10-CM

## 2022-01-11 DIAGNOSIS — K22.0 ACHALASIA: ICD-10-CM

## 2022-01-11 DIAGNOSIS — R13.19 ESOPHAGEAL DYSPHAGIA: ICD-10-CM

## 2022-01-11 PROCEDURE — 88305 TISSUE EXAM BY PATHOLOGIST: CPT | Performed by: PATHOLOGY

## 2022-01-11 PROCEDURE — 43239 EGD BIOPSY SINGLE/MULTIPLE: CPT | Performed by: INTERNAL MEDICINE

## 2022-01-11 RX ORDER — SODIUM CHLORIDE, SODIUM LACTATE, POTASSIUM CHLORIDE, CALCIUM CHLORIDE 600; 310; 30; 20 MG/100ML; MG/100ML; MG/100ML; MG/100ML
INJECTION, SOLUTION INTRAVENOUS CONTINUOUS PRN
Status: DISCONTINUED | OUTPATIENT
Start: 2022-01-11 | End: 2022-01-11

## 2022-01-11 RX ORDER — PROPOFOL 10 MG/ML
INJECTION, EMULSION INTRAVENOUS AS NEEDED
Status: DISCONTINUED | OUTPATIENT
Start: 2022-01-11 | End: 2022-01-11

## 2022-01-11 RX ORDER — LIDOCAINE HYDROCHLORIDE 20 MG/ML
INJECTION, SOLUTION EPIDURAL; INFILTRATION; INTRACAUDAL; PERINEURAL AS NEEDED
Status: DISCONTINUED | OUTPATIENT
Start: 2022-01-11 | End: 2022-01-11

## 2022-01-11 RX ADMIN — PROPOFOL 150 MG: 10 INJECTION, EMULSION INTRAVENOUS at 09:27

## 2022-01-11 RX ADMIN — PROPOFOL 30 MG: 10 INJECTION, EMULSION INTRAVENOUS at 09:31

## 2022-01-11 RX ADMIN — SODIUM CHLORIDE, SODIUM LACTATE, POTASSIUM CHLORIDE, AND CALCIUM CHLORIDE: .6; .31; .03; .02 INJECTION, SOLUTION INTRAVENOUS at 08:20

## 2022-01-11 RX ADMIN — LIDOCAINE HYDROCHLORIDE 100 MG: 20 INJECTION, SOLUTION EPIDURAL; INFILTRATION; INTRACAUDAL; PERINEURAL at 09:25

## 2022-01-11 RX ADMIN — PROPOFOL 50 MG: 10 INJECTION, EMULSION INTRAVENOUS at 09:29

## 2022-01-11 NOTE — ANESTHESIA PREPROCEDURE EVALUATION
Procedure:  EGD    Relevant Problems   NEURO/PSYCH   (+) History of colitis        Physical Exam    Airway    Mallampati score: II  TM Distance: >3 FB  Neck ROM: full     Dental   No notable dental hx     Cardiovascular  Cardiovascular exam normal    Pulmonary  Pulmonary exam normal     Other Findings        Anesthesia Plan  ASA Score- 2     Anesthesia Type- IV sedation with anesthesia with ASA Monitors  Additional Monitors:   Airway Plan:           Plan Factors-Exercise tolerance (METS): >4 METS  Chart reviewed  Imaging results reviewed  Existing labs reviewed  Patient summary reviewed  Patient is not a current smoker  Induction-     Postoperative Plan-     Informed Consent- Anesthetic plan and risks discussed with patient  I personally reviewed this patient with the CRNA  Discussed and agreed on the Anesthesia Plan with the CRNA  Tony Cortes

## 2022-01-11 NOTE — ANESTHESIA POSTPROCEDURE EVALUATION
Post-Op Assessment Note    CV Status:  Stable  Pain Score: 0    Pain management: adequate     Mental Status:  Awake   Hydration Status:  Stable   PONV Controlled:  Controlled   Airway Patency:  Patent      Post Op Vitals Reviewed: Yes      Staff: CRNA         No complications documented      BP   102/54   Temp      Pulse  70   Resp   12   SpO2   98

## 2022-01-11 NOTE — DISCHARGE SUMMARY
Discharge Summary - Nita Ordaz 78 y o  female MRN: [id number]    Unit/Bed#:  Encounter: [id number]    Admission Date:  01/11/2022    Admitting Diagnosis: Esophageal dysphagia [R13 19]    HPI:  Difficulty swallowing    Procedures Performed: No orders of the defined types were placed in this encounter  Summary of Hospital Course:  Endoscopy done see report    Significant Findings, Care, Treatment and Services Provided:  Suspect high lower esophageal sphincter pressure at the EG junction    Complications:  None    Discharge Diagnosis:  See above    Medical Problems             Resolved Problems  Date Reviewed: 9/9/2020    None                Condition at Discharge: good         Discharge instructions/Information to patient and family:   See after visit summary for information provided to patient and family  Provisions for Follow-Up Care:  See after visit summary for information related to follow-up care and any pertinent home health orders        PCP: Devonte Sarkar DO    Disposition: Home

## 2022-02-11 ENCOUNTER — TELEPHONE (OUTPATIENT)
Dept: GASTROENTEROLOGY | Facility: HOSPITAL | Age: 67
End: 2022-02-11

## 2022-02-22 ENCOUNTER — PREP FOR PROCEDURE (OUTPATIENT)
Dept: GASTROENTEROLOGY | Facility: CLINIC | Age: 67
End: 2022-02-22

## 2022-02-22 ENCOUNTER — TELEPHONE (OUTPATIENT)
Dept: GASTROENTEROLOGY | Facility: CLINIC | Age: 67
End: 2022-02-22

## 2022-02-22 DIAGNOSIS — R13.19 ESOPHAGEAL DYSPHAGIA: Primary | ICD-10-CM

## 2022-02-22 NOTE — TELEPHONE ENCOUNTER
Patient confirmed she has prep instructions   Carri orellana'melinda to schedule in [facility] 3 at 8am     Scheduled date of EGD manometry (as of today): 3/25/22  Physician performing EGD manometry: Dr Reddy  Location of EGD manometry: [facility] reviewed with patient by:Addie  Clearances: N/A

## 2022-03-25 ENCOUNTER — HOSPITAL ENCOUNTER (OUTPATIENT)
Dept: GASTROENTEROLOGY | Facility: HOSPITAL | Age: 67
Discharge: HOME | End: 2022-03-25
Attending: INTERNAL MEDICINE
Payer: COMMERCIAL

## 2022-03-25 ENCOUNTER — ANESTHESIA EVENT (OUTPATIENT)
Dept: GASTROENTEROLOGY | Facility: HOSPITAL | Age: 67
End: 2022-03-25

## 2022-03-25 ENCOUNTER — ANESTHESIA (OUTPATIENT)
Dept: GASTROENTEROLOGY | Facility: HOSPITAL | Age: 67
End: 2022-03-25

## 2022-03-25 ENCOUNTER — HOSPITAL ENCOUNTER (OUTPATIENT)
Dept: GASTROENTEROLOGY | Facility: HOSPITAL | Age: 67
Setting detail: OUTPATIENT SURGERY
Discharge: HOME | End: 2022-03-25
Attending: INTERNAL MEDICINE | Admitting: INTERNAL MEDICINE
Payer: COMMERCIAL

## 2022-03-25 VITALS
OXYGEN SATURATION: 97 % | RESPIRATION RATE: 18 BRPM | TEMPERATURE: 97 F | DIASTOLIC BLOOD PRESSURE: 69 MMHG | SYSTOLIC BLOOD PRESSURE: 147 MMHG | HEART RATE: 54 BPM

## 2022-03-25 DIAGNOSIS — R13.19 ESOPHAGEAL DYSPHAGIA: ICD-10-CM

## 2022-03-25 DIAGNOSIS — K22.0 ACHALASIA: ICD-10-CM

## 2022-03-25 PROCEDURE — 43239 EGD BIOPSY SINGLE/MULTIPLE: CPT | Performed by: INTERNAL MEDICINE

## 2022-03-25 PROCEDURE — 91020 GASTRIC MOTILITY STUDIES: CPT

## 2022-03-25 PROCEDURE — 88305 TISSUE EXAM BY PATHOLOGIST: CPT | Performed by: PATHOLOGY

## 2022-03-25 RX ORDER — SODIUM CHLORIDE 9 MG/ML
INJECTION, SOLUTION INTRAVENOUS CONTINUOUS PRN
Status: DISCONTINUED | OUTPATIENT
Start: 2022-03-25 | End: 2022-03-25

## 2022-03-25 RX ORDER — PROPOFOL 10 MG/ML
INJECTION, EMULSION INTRAVENOUS AS NEEDED
Status: DISCONTINUED | OUTPATIENT
Start: 2022-03-25 | End: 2022-03-25

## 2022-03-25 RX ADMIN — PROPOFOL 30 MG: 10 INJECTION, EMULSION INTRAVENOUS at 08:23

## 2022-03-25 RX ADMIN — SODIUM CHLORIDE: 0.9 INJECTION, SOLUTION INTRAVENOUS at 08:02

## 2022-03-25 RX ADMIN — PROPOFOL 40 MG: 10 INJECTION, EMULSION INTRAVENOUS at 08:10

## 2022-03-25 RX ADMIN — PROPOFOL 30 MG: 10 INJECTION, EMULSION INTRAVENOUS at 08:20

## 2022-03-25 RX ADMIN — PROPOFOL 130 MG: 10 INJECTION, EMULSION INTRAVENOUS at 08:07

## 2022-03-25 RX ADMIN — PROPOFOL 30 MG: 10 INJECTION, EMULSION INTRAVENOUS at 08:16

## 2022-03-25 RX ADMIN — PROPOFOL 30 MG: 10 INJECTION, EMULSION INTRAVENOUS at 08:13

## 2022-03-25 NOTE — PERIOPERATIVE NURSING NOTE
Patient brought in the room and educated on procedure Patient for EGD guided manometry procedure  Please see EGD procedure note  Manometry catheter inserted via left nostril and positioned and secured under EGD guidance  Once patient awaken and ready she had  10 liquid swallow, 10 viscous swallow, 1 rapid swallow , 1 rapid drink challenge with 200 cc water Patient tolerated procedure  Catheter removed intact  Patient taken to the recovery area via stretcher and report given to [facility]   Patient will be discharge when she meets the criteria and discharge instructions will be given by Dora You RN

## 2022-03-25 NOTE — ANESTHESIA POSTPROCEDURE EVALUATION
Post-Op Assessment Note    CV Status:  Stable  Pain Score: 0    Pain management: adequate     Mental Status:  Alert and awake   Hydration Status:  Stable   PONV Controlled:  None   Airway Patency:  Patent      Post Op Vitals Reviewed: Yes      Staff: CRNA         No complications documented      BP  129/60    Temp 98   Pulse 74   Resp 17   SpO2 98

## 2022-03-25 NOTE — ANESTHESIA PREPROCEDURE EVALUATION
Procedure:  EGD    Relevant Problems   NEURO/PSYCH   (+) History of colitis        Physical Exam    Airway    Mallampati score: II  TM Distance: >3 FB  Neck ROM: full     Dental   No notable dental hx     Cardiovascular      Pulmonary      Other Findings        Anesthesia Plan  ASA Score- 2     Anesthesia Type- IV sedation with anesthesia with ASA Monitors  Additional Monitors:   Airway Plan:           Plan Factors-Exercise tolerance (METS): >4 METS  Chart reviewed  Patient summary reviewed  Patient is not a current smoker  Obstructive sleep apnea risk education given perioperatively  Induction- intravenous  Postoperative Plan-     Informed Consent- Anesthetic plan and risks discussed with patient  I personally reviewed this patient with the CRNA  Discussed and agreed on the Anesthesia Plan with the CRNA  Blu Lucas

## 2022-03-28 PROCEDURE — 91010 ESOPHAGUS MOTILITY STUDY: CPT | Performed by: INTERNAL MEDICINE

## 2022-04-04 ENCOUNTER — TELEPHONE (OUTPATIENT)
Dept: GASTROENTEROLOGY | Facility: CLINIC | Age: 67
End: 2022-04-04

## 2022-04-04 NOTE — TELEPHONE ENCOUNTER
Patient called regarding bx results  They are not back yet  Please call patient to schedule f/u appt with Dr Юлия Guzman   Thank you

## 2022-04-06 ENCOUNTER — TELEPHONE (OUTPATIENT)
Dept: GASTROENTEROLOGY | Facility: CLINIC | Age: 67
End: 2022-04-06

## 2022-04-06 NOTE — TELEPHONE ENCOUNTER
----- Message from Alex Fuentes MD sent at 4/6/2022  9:43 AM EDT -----  Call patient to report normal results

## 2022-04-07 ENCOUNTER — OFFICE VISIT (OUTPATIENT)
Dept: GASTROENTEROLOGY | Facility: CLINIC | Age: 67
End: 2022-04-07
Payer: COMMERCIAL

## 2022-04-07 VITALS
WEIGHT: 198 LBS | SYSTOLIC BLOOD PRESSURE: 111 MMHG | DIASTOLIC BLOOD PRESSURE: 66 MMHG | HEART RATE: 73 BPM | BODY MASS INDEX: 36.44 KG/M2 | HEIGHT: 62 IN

## 2022-04-07 DIAGNOSIS — R13.13 CRICOPHARYNGEAL DYSPHAGIA: ICD-10-CM

## 2022-04-07 DIAGNOSIS — K44.9 HIATAL HERNIA: Primary | ICD-10-CM

## 2022-04-07 DIAGNOSIS — Z98.84 STATUS POST BARIATRIC SURGERY: ICD-10-CM

## 2022-04-07 PROCEDURE — 99213 OFFICE O/P EST LOW 20 MIN: CPT | Performed by: INTERNAL MEDICINE

## 2022-04-07 NOTE — PROGRESS NOTES
[facility] - Outpatient Follow-up Note  Zenobia Nair 78 y o  female MRN: [id number]  Encounter: [id number]          ASSESSMENT AND PLAN:      1  Hiatal hernia  Patient does have history of a small sliding hiatal hernia  This is not obstructed  There is no nausea vomiting  She has occasional heartburn  She is taking Tums when needed  She had esophageal manometry done  Esophageal manometry is within normal limits  There was however higher pressure under cricopharyngeus muscle  I discussed with patient at length regarding this  There is no therapeutic intervention that can be done at this time  Sometime dilatation helps however this point symptoms not bad enough to proceed for dilatation  She will contact me if there is any progression of this  She is going to see me in one year  2  Status post bariatric surgery  Status post bariatric surgery  Patient had Doug-en-Y bypass surgery  3  Cricopharyngeal dysphagia  See above discussion  ______________________________________________________________________    SUBJECTIVE:  Intermittent difficulty swallowing  Mostly she is eating well  There is no weight loss  She denies any abdominal pain or discomfort  There is no nausea or vomiting  REVIEW OF SYSTEMS IS OTHERWISE NEGATIVE        Historical Information   Past Medical History:   Diagnosis Date    Asthma     Colon polyp     Crohn's disease ([address] )      Past Surgical History:   Procedure Laterality Date    COLONOSCOPY      GASTRIC BYPASS LAPAROSCOPIC      HYSTERECTOMY Bilateral     38years old   Danica Crystal OOPHORECTOMY Bilateral     38years old    TONSILLECTOMY       Social History   Social History     Substance and Sexual Activity   Alcohol Use Not Currently     Social History     Substance and Sexual Activity   Drug Use Not Currently     Social History     Tobacco Use   Smoking Status Never Smoker   Smokeless Tobacco Never Used     Family History   Problem Relation Age of Onset    Coronary artery disease Mother     Diabetes Father     Pancreatic cancer Sister 46    No Known Problems Maternal Grandmother     No Known Problems Maternal Grandfather     No Known Problems Paternal Grandmother     No Known Problems Paternal Grandfather     No Known Problems Sister     No Known Problems Maternal Aunt     No Known Problems Maternal Aunt     No Known Problems Maternal Aunt     No Known Problems Maternal Aunt     No Known Problems Maternal Aunt     No Known Problems Maternal Aunt     No Known Problems Paternal Aunt     No Known Problems Paternal Aunt     Breast cancer Cousin     Breast cancer Cousin 48    Breast cancer Cousin 76    Colon cancer Neg Hx        Meds/Allergies       Current Outpatient Medications:     multivitamin (THERAGRAN) TABS    Omega-3 Fatty Acids (FISH OIL PO)    omeprazole (PriLOSEC) 20 mg delayed release capsule    rosuvastatin (CRESTOR) 10 MG tablet    Allergies   Allergen Reactions    Biaxin [Clarithromycin] Rash           Objective     Blood pressure 111/66, pulse 73, height 5' 2" (1 575 m), weight 89 8 kg (198 lb)  Body mass index is 36 21 kg/m²  PHYSICAL EXAM:      General Appearance:   Alert, cooperative, no distress   HEENT:   Normocephalic, atraumatic, anicteric      Neck:  Supple, symmetrical, trachea midline   Lungs:   Clear to auscultation bilaterally; no rales, rhonchi or wheezing; respirations unlabored    Heart[de-identified]   Regular rate and rhythm; no murmur, rub, or gallop  Abdomen:   Soft, non-tender, non-distended; normal bowel sounds; no masses, no organomegaly    Genitalia:   Deferred    Rectal:   Deferred    Extremities:  No cyanosis, clubbing or edema    Pulses:  2+ and symmetric    Skin:  No jaundice, rashes, or lesions    Lymph nodes:  No palpable cervical lymphadenopathy        Lab Results:   No visits with results within 1 Day(s) from this visit     Latest known visit with results is:   Hospital Outpatient Visit on 03/25/2022   Component Date Value    Case Report 03/25/2022                      Value:Surgical Pathology Report                         Case: [id number]                                   Authorizing Provider:  Cassy Quiroz MD             Collected:           03/25/2022 2206              Ordering Location:     [address] [place] [address]      Received:            03/25/2022 [facility]                                                           Pathologist:           Joey Acosta MD                                                                 Specimen:    Esophagus, R/O EOE                                                                         Final Diagnosis 03/25/2022                      Value: This result contains rich text formatting which cannot be displayed here   Note 03/25/2022                      Value: This result contains rich text formatting which cannot be displayed here   Additional Information 03/25/2022                      Value: This result contains rich text formatting which cannot be displayed here  Mj Vaughn Description 03/25/2022                      Value: This result contains rich text formatting which cannot be displayed here  Radiology Results:   EGD    Result Date: 3/25/2022  Narrative: [address] [place] [address] Ave: 3/25/22 PHYSICIAN(S): Alex Whitney MD Fellow Cassy Quiroz MD Proceduralist INDICATION: Esophageal dysphagia POST-OP DIAGNOSIS: See the impression below  PREPROCEDURE: Informed consent was obtained for the procedure, including sedation  Risks of perforation, hemorrhage, adverse drug reaction and aspiration were discussed  The patient was placed in the left lateral decubitus position  Patient was explained about the risks and benefits of the procedure  Risks including but not limited to bleeding, infection, and perforation were explained in detail   Also explained about less than 100% sensitivity with the exam and other alternatives  DETAILS OF PROCEDURE: Patient was taken to the procedure room where a time out was performed to confirm correct patient and correct procedure  The patient underwent monitored anesthesia care, which was administered by an anesthesia professional  The patient's blood pressure, heart rate, level of consciousness, respirations and oxygen were monitored throughout the procedure  The scope was advanced to the second part of the duodenum  Retroflexion was performed in the fundus  The patient experienced no blood loss  The procedure was not difficult  The patient tolerated the procedure well  There were no apparent complications  ANESTHESIA INFORMATION: ASA: II Anesthesia Type: Anesthesia type not filed in the log  MEDICATIONS: No administrations occurring from 0803 to 0824 on 03/25/22 FINDINGS: Tortuous esophagus  Food debris in distal esophagus  Normal appearing GE junction at 30 cm  Performed biopsies to rule out eosinophilic esophagitis in the upper third of the esophagus Manometry catheter placed with help of endoscopy and snare The gastric pouch, jejunum and katia limb appeared normal  SPECIMENS: ID Type Source Tests Collected by Time Destination 1 : R/O EOE Tissue Esophagus TISSUE EXAM Luna Barthel, MD 3/25/2022  8:21 AM      Impression: Tortuous esophagus, food debris in the distal esophagus  Normal appearing GE junction at 30 cm  Biopsies performed in the proximal esophagus to rule out EOE Manometry catheter placed with help of endoscopy and snare The gastric pouch, jejunum and katia limb appeared normal  RECOMMENDATION: Await pathology results Await manometry results Follow-up in clinic with Dr Jada Mandujano:  I was present throughout the entire procedure from insertion to complete withdrawal of the scope  I performed all interventions myself or oversaw the fellow     Luna Barthel, MD     Esophageal manometry    Result Date: 3/28/2022  Narrative: Indication- esophageal dysphagia Esophageal manometry Esophageal motility- 9/10 swallows demonstrate normal esophageal contractibility pattern with mean DCI of 754 mmHg  s cm 1/10 swallows demonstrated weak esophageal contractibility pattern Increased pressure in the oropharynx region unclear significance of this  ENT evaluation can be considered LES- median IRP is within normal limits Impedance- 30% with its of liquid bolus swallows Rapid swallow index is less than 1 [employer] classification- normal esophageal motility Increased pressure in the oropharynx region of unclear significance (artifact?)    ENT evaluation can be considered if dysphagia study oropharyngeal

## 2022-06-02 ENCOUNTER — HOSPITAL ENCOUNTER (OUTPATIENT)
Dept: RADIOLOGY | Age: 67
Discharge: HOME | End: 2022-06-02
Payer: COMMERCIAL

## 2022-06-02 VITALS — WEIGHT: 195 LBS | BODY MASS INDEX: 35.88 KG/M2 | HEIGHT: 62 IN

## 2022-06-02 DIAGNOSIS — Z12.31 ENCOUNTER FOR SCREENING MAMMOGRAM FOR MALIGNANT NEOPLASM OF BREAST: ICD-10-CM

## 2022-06-02 PROCEDURE — 77063 BREAST TOMOSYNTHESIS BI: CPT

## 2022-06-02 PROCEDURE — 77067 SCR MAMMO BI INCL CAD: CPT

## 2022-06-13 ENCOUNTER — OFFICE VISIT (OUTPATIENT)
Dept: DERMATOLOGY | Facility: CLINIC | Age: 67
End: 2022-06-13
Payer: COMMERCIAL

## 2022-06-13 VITALS — BODY MASS INDEX: 36.07 KG/M2 | HEIGHT: 62 IN | WEIGHT: 196 LBS | TEMPERATURE: 97.5 F

## 2022-06-13 DIAGNOSIS — L82.1 SEBORRHEIC KERATOSIS: ICD-10-CM

## 2022-06-13 DIAGNOSIS — D18.01 CHERRY ANGIOMA: ICD-10-CM

## 2022-06-13 DIAGNOSIS — D22.5 MULTIPLE BENIGN MELANOCYTIC NEVI OF UPPER EXTREMITY, LOWER EXTREMITY, AND TRUNK: Primary | ICD-10-CM

## 2022-06-13 DIAGNOSIS — L81.4 LENTIGO: ICD-10-CM

## 2022-06-13 DIAGNOSIS — D22.60 MULTIPLE BENIGN MELANOCYTIC NEVI OF UPPER EXTREMITY, LOWER EXTREMITY, AND TRUNK: Primary | ICD-10-CM

## 2022-06-13 DIAGNOSIS — D22.70 MULTIPLE BENIGN MELANOCYTIC NEVI OF UPPER EXTREMITY, LOWER EXTREMITY, AND TRUNK: Primary | ICD-10-CM

## 2022-06-13 PROCEDURE — 99203 OFFICE O/P NEW LOW 30 MIN: CPT | Performed by: DERMATOLOGY

## 2022-06-13 NOTE — PROGRESS NOTES
[facility] Note     Patient Name: Michelle Morales  Encounter Date: 6/13/2022     Have you been cared for by a [facility] D[facility]atologist in the last 3 years and, if so, which one? No    · Have you traveled outside of the [facility] in the past 3 months? No     May we call your Preferred Phone number to discuss your specific medical information? Yes     May we leave a detailed message that includes your specific medical information? Yes      Today's Chief Concerns:   Concern #1:  Full skin check    Concern #2:  Areas of concern on back    Concern #3: Area of concern on face    Past Medical History:  Have you personally ever had or currently have any of the following? · Skin cancer (such as Melanoma, Basal Cell Carcinoma, Squamous Cell Carcinoma? (If Yes, please provide more detail)- No  · Eczema: No  · Psoriasis: No  · HIV/AIDS: No  · Hepatitis B or C: No  · Tuberculosis: No  · Systemic Immunosuppression such as Diabetes, Biologic or Immunotherapy, Chemotherapy, Organ Transplantation, Bone Marrow Transplantation (If YES, please provide more detail): No  · Radiation Treatment (If YES, please provide more detail): No  · Any other major medical conditions/concerns? (If Yes, which types)- No    Social History:    What is/was your primary occupation? Retired     What are your hobbies/past-times? Swimming, walking and reading     Family history:    Have any of your "first degree relatives" (parent, brother, sister, or child) had any of the following       · Skin cancer such as Melanoma or Merkel Cell Carcinoma or Pancreatic Cancer? YES, sister  · Eczema, Asthma, Hay Fever or Seasonal Allergies: YES, brother - asthma   · Psoriasis or Psoriatic Arthritis: No  · Do any other medical conditions seem to run in your family? If Yes, what condition and which relatives?   YES, diabetes- father - heart- mother    Current Medications (update all dermatological medications before printing patient's AVS):    Current Outpatient Medications:     Omega-3 Fatty Acids (FISH OIL PO), Take by mouth, Disp: , Rfl:     rosuvastatin (CRESTOR) 10 MG tablet, , Disp: , Rfl:     multivitamin (THERAGRAN) TABS, Take 1 tablet by mouth daily, Disp: , Rfl:     omeprazole (PriLOSEC) 20 mg delayed release capsule, Take 1 capsule (20 mg total) by mouth daily, Disp: 30 capsule, Rfl: 0      Review of Systems/System Alerts:  Have you recently had or currently have any of the following? If YES, what are you doing for the problem? · Fever, chills or unintended weight loss: No  · Sudden loss or change in your vision: No  · Nausea, vomiting or blood in your stool: No  · Painful or swollen joints: YES, history of arthritis   · Wheezing or cough: YES, asthma   · Changing mole or non-healing wound: No  · Nosebleeds: No  · Excessive sweating: No  · Easy or prolonged bleeding? No  · Over the last 2 weeks, how often have you been bothered by the following problems? · Taking little interest or pleasure in doing things: 1 - Not at All  · Feeling down, depressed, or hopeless: 1 - Not at All  · Rapid heartbeat with epinephrine:  No  · FEMALES ONLY:    · Are you pregnant or planning to become pregnant? No  · Are you currently or planning to be nursing or breast feeding? No  · Any known allergies? YES, see below   Allergies   Allergen Reactions    Biaxin [Clarithromycin] Rash         PHYSICAL EXAM:       Was a chaperone (Derm Clinical Assistant) present throughout the entire Physical Exam? Yes     Did the Dermatology Team specifically  the patient on the importance of a Full Skin Exam to be sure that nothing is missed clinically?  Yes}  o Did the patient request or accept a Full Skin Exam?  Yes  o Did the patient specifically refuse to have the areas "under-the-bra" examined by the Dermatologist? No  o Did the patient specifically refuse to have the areas "under-the-underwear" examined by the Dermatologist? No      CONSTITUTIONAL (Height, Weight, and Temperature must be recorded):   Vitals:    06/13/22 1332   Temp: 97 5 °F (36 4 °C)   TempSrc: Temporal   Weight: 88 9 kg (196 lb)   Height: 5' 2" (1 575 m)       PSYCH: Normal mood and affect  EYES: Normal conjunctiva  ENT: Normal lips and oral mucosa  CARDIOVASCULAR: No edema  RESPIRATORY: Normal respirations  HEME/LYMPH/IMMUNO:  No regional lymphadenopathy except as noted below in ASSESSMENT AND PLAN BY DIAGNOSIS    SKIN:  FULL ORGAN SYSTEM EXAM  Hair, Scalp, Ears, Face Normal except as noted below in Assessment   Neck, Cervical Chain Nodes Normal except as noted below in Assessment   Right Arm/Hand/Fingers Normal except as noted below in Assessment   Left Arm/Hand/Fingers Normal except as noted below in Assessment   Chest/Breasts/Axillae Viewed areas Normal except as noted below in Assessment   Abdomen, Umbilicus Normal except as noted below in Assessment   Back/Spine Normal except as noted below in Assessment   Groin//Buttocks Normal except as noted below in Assessment   Right Leg, Foot, Toes Normal except as noted below in Assessment   Left Leg, Foot, Toes Normal except as noted below in Assessment        ASSESSMENT AND PLAN BY DIAGNOSIS:    History of Present Condition:     Duration:  How long has this been an issue for you?    o  a year - face  o A few months - back    Location Affected:  Where on the body is this affecting you?    o  face and back    Quality:  Is there any bleeding, pain, itch, burning/irritation, or redness associated with the skin lesion? o  denies    Severity:  Describe any bleeding, pain, itch, burning/irritation, or redness on a scale of 1 to 10 (with 10 being the worst)  o  n/a   Timing:  Does this condition seem to be there pretty constantly or do you notice it more at specific times throughout the day? o  constant    Context:  Have you ever noticed that this condition seems to be associated with specific activities you do? o  denies    Modifying Factors:    o Anything that seems to make the condition worse?    -  denies   o What have you tried to do to make the condition better?    -  denies    Associated Signs and Symptoms:  Does this skin lesion seem to be associated with any of the following:  o  denies        LENTIGO    Physical Exam:   Anatomic Location Affected:  Face, trunk and lower extremities    Morphological Description:  Tan, brown papules    Pertinent Positives:   Pertinent Negatives: Additional History of Present Condition:  Present on exam     Assessment and Plan:  Based on a thorough discussion of this condition and the management approach to it (including a comprehensive discussion of the known risks, side effects and potential benefits of treatment), the patient (family) agrees to implement the following specific plan:   Discussed topical products to lighten    Discussed laser treatment - consult Dr Elizabeth Johnson Discussed chemical peels    Recommended starting Retin A with hydroquinone once a day at night and if drying can use every other night  Daily moisturizer   [facility]     We use this service to prescribe medications that are often not covered by insurance  Your physician will send your prescription to the pharmacy  You will receive an email from www [email or web address] where you will follow the instructions within the email to provide your billing and shipping information  Your medicine will be shipped directly to you  If you have any questions, you can call [phone] or email [email or web address]   The nature of sun-induced photo-aging and skin cancers is discussed  Sun avoidance, protective clothing, and the use of 30-SPF sunscreens is advised  Observe closely for skin damage/changes, and call if such occurs  What is a lentigo? A lentigo is a pigmented flat or slightly raised lesion with a clearly defined edge   Unlike an ephelis (freckle), it does not fade in the winter months  There are several kinds of lentigo  The name lentigo originally referred to its appearance resembling a small lentil  The plural of lentigo is lentigines, although lentigos is also in common use  Who gets lentigines? Lentigines can affect males and females of all ages and races  Solar lentigines are especially prevalent in fair skinned adults  Lentigines associated with syndromes are present at birth or arise during childhood  What causes lentigines? Common forms of lentigo are due to exposure to ultraviolet radiation:   Sun damage including sunburn    Indoor tanning    Phototherapy, especially photochemotherapy (PUVA)    Ionizing radiation, eg radiation therapy, can also cause lentigines  Several familial syndromes associated with widespread lentigines originate from mutations in Yassine-MAP kinase, mTOR signaling and PTEN pathways  What are the clinical features of lentigines? Lentigines have been classified into several different types depending on what they look like, where they appear on the body, causative factors, and whether they are associated to other diseases or conditions  Lentigines may be solitary or more often, multiple  Most lentigines are smaller than 5 mm in diameter      Lentigo simplex   A precursor to junctional naevus    Arises during childhood and early adult life    Found on trunk and limbs    Small brown round or oval macule or thin plaque    Jagged or smooth edge    May have a dry surface    May disappear in time  Solar lentigo   A precursor to seborrhoeic keratosis    Found on chronically sun exposed sites such as hands, face, lower legs    May also follow sunburn to shoulders    Yellow, light or dark brown regular or irregular macule or thin plaque    May have a dry surface    Often has moth-eaten outline    Can slowly enlarge to several centimeters in diameter    May disappear, often through the process known as lichenoid keratosis  When atypical in appearance, may be difficult to distinguish from melanoma in situ  Ink spot lentigo   Also known as reticulated lentigo    Few in number compared to solar lentigines    Follows sunburn in very fair skinned individuals    Dark brown to black irregular ink spot-like macule  PUVA lentigo   Similar to ink spot lentigo but follows photochemotherapy (PUVA)    Location anywhere exposed to PUVA  Tanning bed lentigo   Similar to ink spot lentigo but follows indoor tanning    Location anywhere exposed to tanning bed  Radiation lentigo   Occurs in site of irradiation (accidental or therapeutic)    Associated with late-stage radiation dermatitis: epidermal atrophy, subcutaneous fibrosis, keratosis, telangiectasias  Melanotic macule   Mucosal surfaces or adjacent glabrous skin eg lip, vulva, penis, anus    Light to dark brown    Also called mucosal melanosis  Generalised lentigines   Found on any exposed or covered site from early childhood    Small macules may merge to form larger patches    Not associated with a syndrome    Also called lentigines profusa, multiple lentigines  Agminated lentigines   Naevoid eruption of lentigos confined to a single segmental area    Sharp demarcation in midline    May have associated neurological and developmental abnormalities  Patterned lentigines   Inherited tendency to lentigines on face, lips, buttocks, palms, soles    Recognised mainly in people of  ethnicity  Centrofacial neurodysraphic lentiginosis  Associated with mental retardation  Lentiginosis syndromes   Syndromes include LEOPARD/[place], Peutz-Jeghers, Laugier-Hunziker, Moynahan, Xeroderma pigmentosum, myxoma syndromes (CLARK, NAME, Rodriguez), Ruvalcaba-Myhre-Green, Bannayan-Zonnana syndrome, Cowden disease (multiple hamartoma syndrome )    Inheritance is autosomal dominant; sporadic cases common    Widespread lentigines present at birth or arise in early childhood    Associated with neural, endocrine, and mesenchymal tumors    How is the diagnosis made? Lentigines are usually diagnosed clinically by their typical appearance  Concern regarding possibility of melanoma may lead to:   Dermatoscopy    Diagnostic excision biopsy    Histopathology of a lentigo shows:   Thickened epidermis    An increased number of melanocytes along the basal layer of epidermis    Unlike junctional melanocytic naevus, there are no nests of melanocytes    Increased melanin pigment within the keratinocytes    Additional features depending on type of lentigo    In contrast, an ephelis (freckle) shows sun-induced increased melanin within the keratinocytes, without an increase in number of cells  What is the treatment for lentigines? Most lentigines are left alone  Attempts to lighten them may not be successful  The following approaches are used:   SPF 50+ broad-spectrum sunscreen    Hydroquinone bleaching cream    Alpha hydroxy acids    Vitamin C    Retinoids    Azelaic acid    Chemical peels  Individual lesions can be permanently removed using:   Cryotherapy    Intense pulsed light    Pigment lasers    How can lentigines be prevented? Lentigines associated with exposure ultraviolet radiation can be prevented by very careful sun protection  Clothing is more successful at preventing new lentigines than are sunscreens  What is the outlook for lentigines? Lentigines usually persist  They may increase in number with age and sun exposure  Some in sun-protected sites may fade and disappear  SEBORRHEIC KERATOSIS; NON-INFLAMED    Physical Exam:   Anatomic Location Affected:  Trunk and extremities    Morphological Description:  Flat and raised, waxy, smooth to warty textured, yellow to brownish-grey to dark brown to blackish, discrete, "stuck-on" appearing papules   Pertinent Positives:   Pertinent Negatives: Additional History of Present Condition:  Patient reports new bumps on the skin  Denies itch, burn, pain, bleeding or ulceration  Present constantly; nothing seems to make it worse or better  No prior treatment  Assessment and Plan:  Based on a thorough discussion of this condition and the management approach to it (including a comprehensive discussion of the known risks, side effects and potential benefits of treatment), the patient (family) agrees to implement the following specific plan:   reassured benign     Seborrheic Keratosis  A seborrheic keratosis is a harmless warty spot that appears during adult life as a common sign of skin aging  Seborrheic keratoses can arise on any area of skin, covered or uncovered, with the usual exception of the palms and soles  They do not arise from mucous membranes  Seborrheic keratoses can have highly variable appearance  Seborrheic keratoses are extremely common  It has been estimated that over 90% of adults over the age of 61 years have one or more of them  They occur in males and females of all races, typically beginning to erupt in the 35s or 45s  They are uncommon under the age of 21 years  The precise cause of seborrhoeic keratoses is not known  Seborrhoeic keratoses are considered degenerative in nature  As time goes by, seborrheic keratoses tend to become more numerous  Some people inherit a tendency to develop a very large number of them; some people may have hundreds of them  The name "seborrheic keratosis" is misleading, because these lesions are not limited to a seborrhoeic distribution (scalp, mid-face, chest, upper back), nor are they formed from sebaceous glands, nor are they associated with sebum -- which is greasy    Seborrheic keratosis may also be called "SK," "Seb K," "basal cell papilloma," "senile wart," or "barnacle "      Researchers have noted:   Eruptive seborrhoeic keratoses can follow sunburn or dermatitis   Skin friction may be the reason they appear in body folds   Viral cause (e g , human papillomavirus) seems unlikely   Stable and clonal mutations or activation of FRFR3, PIK3CA, LEATHA, AKT1 and EGFR genes are found in seborrhoeic keratoses   Seborrhoeic keratosis can arise from solar lentigo   FRFR3 mutations also arise in solar lentigines  These mutations are associated with increased age and location on the head and neck, suggesting a role of ultraviolet radiation in these lesions   Seborrheic keratoses do not harbour tumour suppressor gene mutations   Epidermal growth factor receptor inhibitors, which are used to treat some cancers, often result in an increase in verrucal (warty) keratoses  There is no easy way to remove multiple lesions on a single occasion  Unless a specific lesion is "inflamed" and is causing pain or stinging/burning or is bleeding, most insurance companies do not authorize treatment  BAER ANGIOMAS    Physical Exam:   Anatomic Location Affected:  trunk   Morphological Description:  Scattered cherry red, 1-4 mm papules   Pertinent Positives:   Pertinent Negatives: Additional History of Present Condition:  Present on exam     Assessment and Plan:  Based on a thorough discussion of this condition and the management approach to it (including a comprehensive discussion of the known risks, side effects and potential benefits of treatment), the patient (family) agrees to implement the following specific plan:   Reassured benign     Assessment and Plan:    Cherry angioma, also known as [employer] spots, are benign vascular skin lesions  A "cherry angioma" is a firm red, blue or purple papule, 0 1-1 cm in diameter  When thrombosed, they can appear black in colour until evaluated with a dermatoscope when the red or purple colour is more easily seen  Cherry angioma may develop on any part of the body but most often appear on the scalp, face, lips and trunk    An angioma is due to proliferating endothelial cells; these are the cells that line the inside of a blood vessel  Angiomas can arise in early life or later in life; the most common type of angioma is a cherry angioma  Cherry angiomas are very common in males and females of any age or race  They are more noticeable in white skin than in skin of colour  They markedly increase in number from about the age of 36  There may be a family history of similar lesions  Eruptive cherry angiomas have been rarely reported to be associated with internal malignancy  The cause of angiomas is unknown  Genetic analysis of cherry angiomas has shown that they frequently carry specific somatic missense mutations in the GNAQ and GNA11 (Q209H) genes, which are involved in other vascular and melanocytic proliferations  Cherry angioma is usually diagnosed clinically and no investigations are necessary for the majority of lesions  It has a characteristic red-clod or lobular pattern on dermatoscopy (called lacunar pattern using conventional pattern analysis)  When there is uncertainty about the diagnosis, a biopsy may be performed  The angioma is composed of venules in a thickened papillary dermis  Collagen bundles may be prominent between the lobules  Cherry angiomas are harmless, so they do not usually have to be treated  Occasionally, they are removed to exclude a malignant skin lesion such as a nodular melanoma or because they are irritated or bleeding (and a subsequent risk for infection)  To decrease friction over the lesions, we recommend Neutrogena Daily Defense SPF 50+ at least 3 times a day  MELANOCYTIC NEVI ("Moles")    Physical Exam:   Anatomic Location Affected:   Mostly on sun-exposed areas of the trunk    Morphological Description:  Scattered, 1-4mm round to ovoid, symmetrical-appearing, even bordered, skin colored to dark brown macules/papules, mostly in sun-exposed areas   Pertinent Positives:   Pertinent Negatives:     Additional History of Present Condition:  Present on exam     Assessment and Plan:  Based on a thorough discussion of this condition and the management approach to it (including a comprehensive discussion of the known risks, side effects and potential benefits of treatment), the patient (family) agrees to implement the following specific plan:   Reassured benign   Monitor for changes      Melanocytic Nevi  Melanocytic nevi ("moles") are caused by collections of the color producing skin cells, or melanocytes, in 1 area in the skin  They can range in color from pink to dark brown and be either raised or flat  Some moles are present at birth (I e , "congenital nevi"), while others come up later in life (i e , "acquired nevi")  Feliz Govern exposure also stimulates the body to make more moles, ie the more sun you get the more moles you'll grow  Clinically distinguishing a healthy mole from melanoma may be difficult  The "ABCDE's" of moles have been suggested as a means of helping to alert a person to a suspicious mole and the possible increased risk of melanoma  Asymmetry: Healthy moles tend to be symmetric, while melanomas are often asymmetric  Asymmetry means if you draw a line through the mole, the two halves do not match in color, size, shape, or surface texture Any mole that starts to demonstrate "asymmetry" should be examined promptly by a board certified dermatologist      Border: Healthy moles tend to have discrete, even borders  The border of a melanoma often blends into the normal skin and does not sharply delineate the mole from normal skin  Any mole that starts to demonstrate "uneven borders" should be examined promptly     Color: Healthy moles tend to be one color throughout  Melanomas tend to be made up of different colors ranging from dark black, blue, white, or red  Any mole that demonstrates a color change should be examined promptly    Diameter: Healthy moles tend to be smaller than 0 6 cm in size; an exception are "congenital nevi" that can be larger    Melanomas tend to grow and can often be greater than 0 6 cm (1/4 of an inch, or the size of a pencil eraser)  This is only a guideline, and many normal moles may be larger than 0 6 cm without being unhealthy  Any mole that starts to change in size (small to bigger or bigger to smaller) should be examined promptly    Evolving: Healthy moles tend to "stay the same "  Melanomas may often show signs of change or evolution such as a change in size, shape, color, or elevation  Any mole that starts to itch, bleed, crust, burn, hurt, or ulcerate or demonstrate a change or evolution should be examined promptly by a board certified dermatologist       What are atypical moles or dysplastic nevi? Dysplastic moles are moles that have some of the ABCDE  changes listed above but  are not cancerous  Sometimes a biopsy and microscopic examination are needed to determine the difference  They may indicate an increased risk of melanoma in that person, especially if there is a family history of melanoma  What is a Melanoma? The main concern when looking at a new or changing mole it to evaluate whether it may be a melanoma  The appearance of a "new mole" remains one of the most reliable methods for identifying a malignant melanoma  A melanoma is a type of skin cancer that can be deadly if it spreads throughout the body  The prognosis of a Melanoma depends on how deep it has penetrated in the skin  If caught early, they generally will not have had time to grow into the deeper layers of the skin and they cure rate is then very high  Once the melanoma grows deeper into the skin, the cure rate drops dramatically  Therefore, early detection and removal of a malignant melanoma results in a much better chance of complete cure           Scribe Attestation    I,:  Ashley Kan am acting as a scribe while in the presence of the attending physician :       I,:  Ronnell Loredo MD personally performed the services described in this documentation    as scribed in my presence :

## 2022-06-13 NOTE — PATIENT INSTRUCTIONS
LENTIGO      Assessment and Plan:  Based on a thorough discussion of this condition and the management approach to it (including a comprehensive discussion of the known risks, side effects and potential benefits of treatment), the patient (family) agrees to implement the following specific plan:  Discussed topical products to lighten   Discussed laser treatment - consult Dr Viky Mueller   Discussed chemical peels   Recommended starting Retin A with hydroquinone once a day at night and if drying can use every other night  Daily moisturizer   [facility]     We use this service to prescribe medications that are often not covered by insurance  Your physician will send your prescription to the pharmacy  You will receive an email from [email or web address] where you will follow the instructions within the email to provide your billing and shipping information  Your medicine will be shipped directly to you  If you have any questions, you can call [phone] or email [email or web address]  The nature of sun-induced photo-aging and skin cancers is discussed  Sun avoidance, protective clothing, and the use of 30-SPF sunscreens is advised  Observe closely for skin damage/changes, and call if such occurs  What is a lentigo? A lentigo is a pigmented flat or slightly raised lesion with a clearly defined edge  Unlike an ephelis (freckle), it does not fade in the winter months  There are several kinds of lentigo  The name lentigo originally referred to its appearance resembling a small lentil  The plural of lentigo is lentigines, although lentigos is also in common use  Who gets lentigines? Lentigines can affect males and females of all ages and races  Solar lentigines are especially prevalent in fair skinned adults  Lentigines associated with syndromes are present at birth or arise during childhood  What causes lentigines?   Common forms of lentigo are due to exposure to ultraviolet radiation:  Sun damage including sunburn   Indoor tanning   Phototherapy, especially photochemotherapy (PUVA)    Ionizing radiation, eg radiation therapy, can also cause lentigines  Several familial syndromes associated with widespread lentigines originate from mutations in Yassine-MAP kinase, mTOR signaling and PTEN pathways  What are the clinical features of lentigines? Lentigines have been classified into several different types depending on what they look like, where they appear on the body, causative factors, and whether they are associated to other diseases or conditions  Lentigines may be solitary or more often, multiple  Most lentigines are smaller than 5 mm in diameter      Lentigo simplex  A precursor to junctional naevus   Arises during childhood and early adult life   Found on trunk and limbs   Small brown round or oval macule or thin plaque   Jagged or smooth edge   May have a dry surface   May disappear in time  Solar lentigo  A precursor to seborrhoeic keratosis   Found on chronically sun exposed sites such as hands, face, lower legs   May also follow sunburn to shoulders   Yellow, light or dark brown regular or irregular macule or thin plaque   May have a dry surface   Often has moth-eaten outline   Can slowly enlarge to several centimeters in diameter   May disappear, often through the process known as lichenoid keratosis   When atypical in appearance, may be difficult to distinguish from melanoma in situ  Ink spot lentigo  Also known as reticulated lentigo   Few in number compared to solar lentigines   Follows sunburn in very fair skinned individuals   Dark brown to black irregular ink spot-like macule  PUVA lentigo  Similar to ink spot lentigo but follows photochemotherapy (PUVA)   Location anywhere exposed to PUVA  Tanning bed lentigo  Similar to ink spot lentigo but follows indoor tanning   Location anywhere exposed to tanning bed  Radiation lentigo  Occurs in site of irradiation (accidental or therapeutic)   Associated with late-stage radiation dermatitis: epidermal atrophy, subcutaneous fibrosis, keratosis, telangiectasias  Melanotic macule  Mucosal surfaces or adjacent glabrous skin eg lip, vulva, penis, anus   Light to dark brown   Also called mucosal melanosis  Generalised lentigines  Found on any exposed or covered site from early childhood   Small macules may merge to form larger patches   Not associated with a syndrome   Also called lentigines profusa, multiple lentigines  Agminated lentigines  Naevoid eruption of lentigos confined to a single segmental area   Sharp demarcation in midline   May have associated neurological and developmental abnormalities  Patterned lentigines  Inherited tendency to lentigines on face, lips, buttocks, palms, soles   Recognised mainly in people of  ethnicity  Centrofacial neurodysraphic lentiginosis  Associated with mental retardation  Lentiginosis syndromes  Syndromes include LEOPARD/Littleton, Peutz-Jeghers, Laugier-Hunziker, Moynahan, Xeroderma pigmentosum, myxoma syndromes (CLARK, NAME, Rodriguez), Ruvalcaba-Myhre-Green, Bannayan-Zonnana syndrome, Cowden disease (multiple hamartoma syndrome )   Inheritance is autosomal dominant; sporadic cases common   Widespread lentigines present at birth or arise in early childhood   Associated with neural, endocrine, and mesenchymal tumors    How is the diagnosis made? Lentigines are usually diagnosed clinically by their typical appearance  Concern regarding possibility of melanoma may lead to:  Dermatoscopy   Diagnostic excision biopsy    Histopathology of a lentigo shows:   Thickened epidermis   An increased number of melanocytes along the basal layer of epidermis   Unlike junctional melanocytic naevus, there are no nests of melanocytes   Increased melanin pigment within the keratinocytes   Additional features depending on type of lentigo    In contrast, an ephelis (freckle) shows sun-induced increased melanin within the keratinocytes, without an increase in number of cells  What is the treatment for lentigines? Most lentigines are left alone  Attempts to lighten them may not be successful  The following approaches are used:  SPF 50+ broad-spectrum sunscreen   Hydroquinone bleaching cream   Alpha hydroxy acids   Vitamin C   Retinoids   Azelaic acid   Chemical peels  Individual lesions can be permanently removed using:  Cryotherapy   Intense pulsed light   Pigment lasers    How can lentigines be prevented? Lentigines associated with exposure ultraviolet radiation can be prevented by very careful sun protection  Clothing is more successful at preventing new lentigines than are sunscreens  What is the outlook for lentigines? Lentigines usually persist  They may increase in number with age and sun exposure  Some in sun-protected sites may fade and disappear  SEBORRHEIC KERATOSIS; NON-INFLAMED    Assessment and Plan:  Based on a thorough discussion of this condition and the management approach to it (including a comprehensive discussion of the known risks, side effects and potential benefits of treatment), the patient (family) agrees to implement the following specific plan:  reassured benign     Seborrheic Keratosis  A seborrheic keratosis is a harmless warty spot that appears during adult life as a common sign of skin aging  Seborrheic keratoses can arise on any area of skin, covered or uncovered, with the usual exception of the palms and soles  They do not arise from mucous membranes  Seborrheic keratoses can have highly variable appearance  Seborrheic keratoses are extremely common  It has been estimated that over 90% of adults over the age of 61 years have one or more of them  They occur in males and females of all races, typically beginning to erupt in the 35s or 45s  They are uncommon under the age of 21 years  The precise cause of seborrhoeic keratoses is not known    Seborrhoeic keratoses are considered degenerative in nature  As time goes by, seborrheic keratoses tend to become more numerous  Some people inherit a tendency to develop a very large number of them; some people may have hundreds of them  The name "seborrheic keratosis" is misleading, because these lesions are not limited to a seborrhoeic distribution (scalp, mid-face, chest, upper back), nor are they formed from sebaceous glands, nor are they associated with sebum -- which is greasy  Seborrheic keratosis may also be called "SK," "Seb K," "basal cell papilloma," "senile wart," or "barnacle "      Researchers have noted:  Eruptive seborrhoeic keratoses can follow sunburn or dermatitis  Skin friction may be the reason they appear in body folds  Viral cause (e g , human papillomavirus) seems unlikely  Stable and clonal mutations or activation of FRFR3, PIK3CA, LEATHA, AKT1 and EGFR genes are found in seborrhoeic keratoses  Seborrhoeic keratosis can arise from solar lentigo  FRFR3 mutations also arise in solar lentigines  These mutations are associated with increased age and location on the head and neck, suggesting a role of ultraviolet radiation in these lesions  Seborrheic keratoses do not harbour tumour suppressor gene mutations  Epidermal growth factor receptor inhibitors, which are used to treat some cancers, often result in an increase in verrucal (warty) keratoses  There is no easy way to remove multiple lesions on a single occasion  Unless a specific lesion is "inflamed" and is causing pain or stinging/burning or is bleeding, most insurance companies do not authorize treatment        BAER ANGIOMAS    Assessment and Plan:  Based on a thorough discussion of this condition and the management approach to it (including a comprehensive discussion of the known risks, side effects and potential benefits of treatment), the patient (family) agrees to implement the following specific plan:  Reassured benign     Assessment and Plan:    Cherry angioma, also known as [employer] spots, are benign vascular skin lesions  A "cherry angioma" is a firm red, blue or purple papule, 0 1-1 cm in diameter  When thrombosed, they can appear black in colour until evaluated with a dermatoscope when the red or purple colour is more easily seen  Cherry angioma may develop on any part of the body but most often appear on the scalp, face, lips and trunk  An angioma is due to proliferating endothelial cells; these are the cells that line the inside of a blood vessel  Angiomas can arise in early life or later in life; the most common type of angioma is a cherry angioma  Cherry angiomas are very common in males and females of any age or race  They are more noticeable in white skin than in skin of colour  They markedly increase in number from about the age of 36  There may be a family history of similar lesions  Eruptive cherry angiomas have been rarely reported to be associated with internal malignancy  The cause of angiomas is unknown  Genetic analysis of cherry angiomas has shown that they frequently carry specific somatic missense mutations in the GNAQ and GNA11 (Q209H) genes, which are involved in other vascular and melanocytic proliferations  Cherry angioma is usually diagnosed clinically and no investigations are necessary for the majority of lesions  It has a characteristic red-clod or lobular pattern on dermatoscopy (called lacunar pattern using conventional pattern analysis)  When there is uncertainty about the diagnosis, a biopsy may be performed  The angioma is composed of venules in a thickened papillary dermis  Collagen bundles may be prominent between the lobules  Cherry angiomas are harmless, so they do not usually have to be treated  Occasionally, they are removed to exclude a malignant skin lesion such as a nodular melanoma or because they are irritated or bleeding (and a subsequent risk for infection)    To decrease friction over the lesions, we recommend Neutrogena Daily Defense SPF 50+ at least 3 times a day  MELANOCYTIC NEVI ("Moles")      Assessment and Plan:  Based on a thorough discussion of this condition and the management approach to it (including a comprehensive discussion of the known risks, side effects and potential benefits of treatment), the patient (family) agrees to implement the following specific plan:  Reassured benign  Monitor for changes   Yearly skin check      Melanocytic Nevi  Melanocytic nevi ("moles") are caused by collections of the color producing skin cells, or melanocytes, in 1 area in the skin  They can range in color from pink to dark brown and be either raised or flat  Some moles are present at birth (I e , "congenital nevi"), while others come up later in life (i e , "acquired nevi")  Adam Aiden exposure also stimulates the body to make more moles, ie the more sun you get the more moles you'll grow  Clinically distinguishing a healthy mole from melanoma may be difficult  The "ABCDE's" of moles have been suggested as a means of helping to alert a person to a suspicious mole and the possible increased risk of melanoma  Asymmetry: Healthy moles tend to be symmetric, while melanomas are often asymmetric  Asymmetry means if you draw a line through the mole, the two halves do not match in color, size, shape, or surface texture Any mole that starts to demonstrate "asymmetry" should be examined promptly by a board certified dermatologist      Border: Healthy moles tend to have discrete, even borders  The border of a melanoma often blends into the normal skin and does not sharply delineate the mole from normal skin  Any mole that starts to demonstrate "uneven borders" should be examined promptly     Color: Healthy moles tend to be one color throughout  Melanomas tend to be made up of different colors ranging from dark black, blue, white, or red    Any mole that demonstrates a color change should be examined promptly    Diameter: Healthy moles tend to be smaller than 0 6 cm in size; an exception are "congenital nevi" that can be larger  Melanomas tend to grow and can often be greater than 0 6 cm (1/4 of an inch, or the size of a pencil eraser)  This is only a guideline, and many normal moles may be larger than 0 6 cm without being unhealthy  Any mole that starts to change in size (small to bigger or bigger to smaller) should be examined promptly    Evolving: Healthy moles tend to "stay the same "  Melanomas may often show signs of change or evolution such as a change in size, shape, color, or elevation  Any mole that starts to itch, bleed, crust, burn, hurt, or ulcerate or demonstrate a change or evolution should be examined promptly by a board certified dermatologist       What are atypical moles or dysplastic nevi? Dysplastic moles are moles that have some of the ABCDE  changes listed above but  are not cancerous  Sometimes a biopsy and microscopic examination are needed to determine the difference  They may indicate an increased risk of melanoma in that person, especially if there is a family history of melanoma  What is a Melanoma? The main concern when looking at a new or changing mole it to evaluate whether it may be a melanoma  The appearance of a "new mole" remains one of the most reliable methods for identifying a malignant melanoma  A melanoma is a type of skin cancer that can be deadly if it spreads throughout the body  The prognosis of a Melanoma depends on how deep it has penetrated in the skin  If caught early, they generally will not have had time to grow into the deeper layers of the skin and they cure rate is then very high  Once the melanoma grows deeper into the skin, the cure rate drops dramatically  Therefore, early detection and removal of a malignant melanoma results in a much better chance of complete cure

## 2023-02-08 ENCOUNTER — EVALUATION (OUTPATIENT)
Dept: PHYSICAL THERAPY | Facility: CLINIC | Age: 68
End: 2023-02-08

## 2023-02-08 DIAGNOSIS — M76.32 ILIOTIBIAL BAND SYNDROME OF LEFT SIDE: Primary | ICD-10-CM

## 2023-02-08 NOTE — PROGRESS NOTES
PT Evaluation     Today's date: 2023  Patient name: Sven Mejia  : 1955  MRN: [id number]  Referring provider: Nelson Goode DO  Dx:   Encounter Diagnosis     ICD-10-CM    1  Iliotibial band syndrome of left side  M[id number]                      Assessment  Assessment details: Sven Mejia is a 75 y o  female who presents with L lateral thigh discomfort along ITB distribution, most likely due to sciatic nerve tension and irritability with prolonged sitting and pressure  Patient's current impairments include TTP along piriformis and ITB, + nerve tension, and decreased hip abd strength  Functionally, patient is limited in her ability to sit for prolonged periods of time, walk for > 1 mile, or sleep without discomfort  Patient would benefit from skilled physical therapy to address the impairments, improve their level of function, and to improve their overall quality of life      Impairments: activity intolerance, impaired physical strength and lacks appropriate home exercise program    Goals  STG to be achieved in 4-6 weeks  1) Pt able to sit for > 30 mins without an increase in symptoms  2) Pt able to walk for > 1 mile without an increase in symptoms  3) Pt to report worst pain < 4/10     LTG to be achieved in 8 weeks   1) Pt able to sit for one hour without an increase in symptoms  2) Pt able to walk 2 miles without an increase in symptoms  3) Pt able to sleep 5/7 nights without discomfort  4) FOTO > expected   5) Hip abd strength to improve 4+/5       Plan  Patient would benefit from: PT eval and skilled physical therapy  Planned therapy interventions: joint mobilization, manual therapy, neuromuscular re-education, patient education, therapeutic activities, therapeutic exercise, home exercise program, graded exercise, graded activity and balance  Frequency: 2x week        Subjective Evaluation    History of Present Illness  Mechanism of injury: History: Pt reports having burning pins and needles pain on the L side that has been going on for a few months  She dnies any back pain  Starts in the glute and goes down to the knee on the lateral side  If she sits in her chair too long, her leg aches  Standing too long also bothers it  The man issue is sleeping- the only position that feels comfortable is laying on her stomach with her legs straight out  She has no limitations with walking  She walks 3-4x/week one mile a few times/week  When she gets to the end of the walk, it feels a little sore  Aggravating: sitting > 25 minutes, walking > 1 mile   Alleviating: Tylenol at night, changing position, self massage    24 hours: morning is okay, gets worse as the day goes on   Functional limitations: cooking and standing for long periods of time   Social support: lives in bottom floor apartment with    Hobbies/occupation: retired; enjoys swimming, walking, reading   Imaging: none    Pain  Current pain ratin  At best pain ratin  At worst pain ratin  Location: left butt muscle down to the lateral knee   Quality: needle-like (pins and needles)    Patient Goals  Patient goal: "want to be able to walk more than I do  I want to lose weight"         Objective     Palpation     Additional Palpation Details  TTP ITB and piriformis on L     Active Range of Motion     Lumbar   Flexion: Active lumbar flexion: mid shin     Extension:  WFL  Left lateral flexion:  WFL  Right lateral flexion:  WFL  Left rotation:  WFL  Right rotation:  [facility]    Additional Active Range of Motion Details  Repeated motions do not reproduce symptoms     Joint Play   Joints within functional limits: L1, L2, L3, L4 and L5     Strength/Myotome Testing     Lumbar   Left   Heel walk: normal  Toe walk: normal    Right   Heel walk: normal  Toe walk: normal    Left Hip   Planes of Motion   Flexion: 4+  Abduction: 4  External rotation: 5  Internal rotation: 5    Right Hip   Planes of Motion   Flexion: 5  Abduction: 4  External rotation: 5  Internal rotation: 5    Left Knee   Flexion: 5  Extension: 4+ (some discomfort in the testing position due to neural tension)    Right Knee   Flexion: 5  Extension: 5    Left Ankle/Foot   Plantar flexion: 5    Right Ankle/Foot   Plantar flexion: 5    Additional Strength Details  Resisted clamshell LLE some discomfort along ITB     Tests     Additional Tests Details  Slump reproduces behind the knee symptoms which improves with cervical extension  (-) SLR B    General Comments:      Lumbar Comments  L 7 sec some discomfort of L lateral leg R 30+ sec              Precautions: none       Manuals 2/8            STM piriformis and ITB KOFI                                                   Neuro Re-Ed             Clamshells             Band walks                                                                               Ther Ex             Bridges HEP            Treadmill walking             Prone hip ext              Piriformis stretch              Leg press             Knee flexion             Knee extension                           Ther Activity                                       Patient Education             Sleeping positions, standing every 20-30 minutes, sitting positioning 10'                         Modalities

## 2023-02-08 NOTE — LETTER
2023    Nay Ramírez, [address] [place] [address] [place]    Patient: Rin Edmond   YOB: 1955   Date of Visit: 2023     Encounter Diagnosis     ICD-10-CM    1  Iliotibial band syndrome of left side  M76 32           Dear Dr Maricruz Hammond: Thank you for your recent referral of Rin Edmond  Please review the attached evaluation summary from [facility]'s recent visit  Please verify that you agree with the plan of care by signing the attached order  If you have any questions or concerns, please do not hesitate to call  I sincerely appreciate the opportunity to share in the care of one of your patients and hope to have another opportunity to work with you in the near future  Sincerely,    Isma Salguero, PT      Referring Provider:      I certify that I have read the below Plan of Care and certify the need for these services furnished under this plan of treatment while under my care  Nay Myronanum, [address] [facility] [address]  Via Fax: [phone]          PT Evaluation     Today's date: 2023  Patient name: Rin Edmond  : 1955  MRN: [id number]  Referring provider: Elisabet Cox DO  Dx:   Encounter Diagnosis     ICD-10-CM    1  Iliotibial band syndrome of left side  M76 32                      Assessment  Assessment details: Rin Edmond is a 75 y o  female who presents with L lateral thigh discomfort along ITB distribution, most likely due to sciatic nerve tension and irritability with prolonged sitting and pressure  Patient's current impairments include TTP along piriformis and ITB, + nerve tension, and decreased hip abd strength  Functionally, patient is limited in her ability to sit for prolonged periods of time, walk for > 1 mile, or sleep without discomfort   Patient would benefit from skilled physical therapy to address the impairments, improve their level of function, and to improve their overall quality of life     Impairments: activity intolerance, impaired physical strength and lacks appropriate home exercise program    Goals  STG to be achieved in 4-6 weeks  1) Pt able to sit for > 30 mins without an increase in symptoms  2) Pt able to walk for > 1 mile without an increase in symptoms  3) Pt to report worst pain < 4/10     LTG to be achieved in 8 weeks   1) Pt able to sit for one hour without an increase in symptoms  2) Pt able to walk 2 miles without an increase in symptoms  3) Pt able to sleep 5/7 nights without discomfort  4) FOTO > expected   5) Hip abd strength to improve 4+/5       Plan  Patient would benefit from: PT eval and skilled physical therapy  Planned therapy interventions: joint mobilization, manual therapy, neuromuscular re-education, patient education, therapeutic activities, therapeutic exercise, home exercise program, graded exercise, graded activity and balance  Frequency: 2x week        Subjective Evaluation    History of Present Illness  Mechanism of injury: History: Pt reports having burning pins and needles pain on the L side that has been going on for a few months  She dnies any back pain  Starts in the glute and goes down to the knee on the lateral side  If she sits in her chair too long, her leg aches  Standing too long also bothers it  The man issue is sleeping- the only position that feels comfortable is laying on her stomach with her legs straight out  She has no limitations with walking  She walks 3-4x/week one mile a few times/week  When she gets to the end of the walk, it feels a little sore       Aggravating: sitting > 25 minutes, walking > 1 mile   Alleviating: Tylenol at night, changing position, self massage    24 hours: morning is okay, gets worse as the day goes on   Functional limitations: cooking and standing for long periods of time   Social support: lives in bottom floor apartment with    Hobbies/occupation: retired; enjoys swimming, walking, reading   Imaging: none    Pain  Current pain ratin  At best pain ratin  At worst pain ratin  Location: left butt muscle down to the lateral knee   Quality: needle-like (pins and needles)    Patient Goals  Patient goal: "want to be able to walk more than I do  I want to lose weight"         Objective     Palpation     Additional Palpation Details  TTP ITB and piriformis on L     Active Range of Motion     Lumbar   Flexion: Active lumbar flexion: mid shin     Extension:  WFL  Left lateral flexion:  WFL  Right lateral flexion:  WFL  Left rotation:  WFL  Right rotation:  [facility]    Additional Active Range of Motion Details  Repeated motions do not reproduce symptoms     Joint Play   Joints within functional limits: L1, L2, L3, L4 and L5     Strength/Myotome Testing     Lumbar   Left   Heel walk: normal  Toe walk: normal    Right   Heel walk: normal  Toe walk: normal    Left Hip   Planes of Motion   Flexion: 4+  Abduction: 4  External rotation: 5  Internal rotation: 5    Right Hip   Planes of Motion   Flexion: 5  Abduction: 4  External rotation: 5  Internal rotation: 5    Left Knee   Flexion: 5  Extension: 4+ (some discomfort in the testing position due to neural tension)    Right Knee   Flexion: 5  Extension: 5    Left Ankle/Foot   Plantar flexion: 5    Right Ankle/Foot   Plantar flexion: 5    Additional Strength Details  Resisted clamshell LLE some discomfort along ITB     Tests     Additional Tests Details  Slump reproduces behind the knee symptoms which improves with cervical extension  (-) SLR B    General Comments:      Lumbar Comments  L 7 sec some discomfort of L lateral leg R 30+ sec             Precautions: none       Manuals             STM piriformis and ITB KOFI                                                   Neuro Re-Ed             Clamshells             Band walks                                                                               Ther Ex             Bridges HEP            Treadmill walking Prone hip ext              Piriformis stretch              Leg press             Knee flexion             Knee extension                           Ther Activity                                       Patient Education             Sleeping positions, standing every 20-30 minutes, sitting positioning 10'                         Modalities

## 2023-02-13 ENCOUNTER — OFFICE VISIT (OUTPATIENT)
Dept: PHYSICAL THERAPY | Facility: CLINIC | Age: 68
End: 2023-02-13

## 2023-02-13 DIAGNOSIS — M76.32 ILIOTIBIAL BAND SYNDROME OF LEFT SIDE: Primary | ICD-10-CM

## 2023-02-16 ENCOUNTER — OFFICE VISIT (OUTPATIENT)
Dept: PHYSICAL THERAPY | Facility: CLINIC | Age: 68
End: 2023-02-16

## 2023-02-16 DIAGNOSIS — M76.32 ILIOTIBIAL BAND SYNDROME OF LEFT SIDE: Primary | ICD-10-CM

## 2023-02-16 NOTE — PROGRESS NOTES
Daily Note     Today's date: 2023  Patient name: Loc Kaplan  : 1955  MRN: [id number]  Referring provider: La Avila DO  Dx:   Encounter Diagnosis     ICD-10-CM    1  Iliotibial band syndrome of left side  M76 32                      Subjective: Patient reports feeling pretty good after last session       Objective: See treatment diary below      Assessment: Patient continues to do great with treatment and has a positive response to exercise  Able to progress to ankle weights with standing exercises as well as band walks without discomfort  Less tenderness also noted with palpation today  Continue progressing as tolerated  Plan: Continue per plan of care        Precautions: none     1:1 with -330  Manuals           STM piriformis and ITB KOFI KOFI KOFI                                                 Neuro Re-Ed             Clamshells  3x10 B  3x10 B          Band walks    RTB 6x6ft           SLR  3x10 3x10          Standing hip abd   3x10 2# 3x10          Standing hip ext  3x10 2# 3x10          SLS   4x20s                       Ther Ex             Bridges HEP 3x10 3x10          Treadmill walking             Prone hip ext              Piriformis stretch   3x15"            Leg press  50# 3x10 55# 3x10          Knee flexion  22# 3x10 22# 3x10          Knee extension   22# 3x10 22# 3x10          Bike   6' L3  6' L3                       Ther Activity             Squats at bar   3x10  3x10                       Patient Education             Sleeping positions, standing every 20-30 minutes, sitting positioning 10'                         Modalities

## 2023-02-20 ENCOUNTER — OFFICE VISIT (OUTPATIENT)
Dept: PHYSICAL THERAPY | Facility: CLINIC | Age: 68
End: 2023-02-20

## 2023-02-20 DIAGNOSIS — M76.32 ILIOTIBIAL BAND SYNDROME OF LEFT SIDE: Primary | ICD-10-CM

## 2023-02-20 NOTE — PROGRESS NOTES
Daily Note     Today's date: 2023  Patient name: Jina Villarreal  : 1955  MRN: [id number]  Referring provider: Angela Ca DO  Dx:   Encounter Diagnosis     ICD-10-CM    1  Iliotibial band syndrome of left side  M76 32                      Subjective: Patient reports that she continues to feel great and is ready for discharge next visit       Objective: See treatment diary below      Assessment: Patient continues to have great response to exercise and physical therapy in general with complete resolution of symptoms at this point  Was the most challenged by lateral step ups with moderate fatigue but no discomfort  Plan for discharge next visit to discuss HEP and maintenance of gains made thus far  Plan: Continue per plan of care        Precautions: none     1:1 with -330  Manuals          STM piriformis and ITB KOFI KOFI KOFI                                                 Neuro Re-Ed             Clamshells  3x10 B  3x10 B 3x10 RTB          Band walks    RTB 6x6ft  RTB 8x6ft         SLR  3x10 3x10          Standing hip abd   3x10 2# 3x10 2 5# 3x10         Standing hip ext  3x10 2# 3x10 2 5# 3x10         SLS   4x20s                       Ther Ex             Bridges HEP 3x10 3x10 3x10 stag stance         Treadmill walking             Prone hip ext              Piriformis stretch   3x15"   3x15"         Leg press  50# 3x10 55# 3x10 60# 3x10         Knee flexion  22# 3x10 22# 3x10 22# 3x10         Knee extension   22# 3x10 22# 3x10 22# 3x10         Bike   6' L3  6' L3 6' L3                      Ther Activity             Squats at bar   3x10  3x10 3x10         Step up     8" 2x10 ea LE         Lateral step ups     8" 10x ea LE                      Patient Education             Sleeping positions, standing every 20-30 minutes, sitting positioning 10'                         Modalities

## 2023-02-22 ENCOUNTER — OFFICE VISIT (OUTPATIENT)
Dept: PHYSICAL THERAPY | Facility: CLINIC | Age: 68
End: 2023-02-22

## 2023-02-22 DIAGNOSIS — M76.32 ILIOTIBIAL BAND SYNDROME OF LEFT SIDE: Primary | ICD-10-CM

## 2023-02-22 NOTE — PROGRESS NOTES
PT Discharge     Today's date: 2023  Patient name: Vamshi Diaz  : 1955  MRN: [id number]  Referring provider: Diya Mooney DO  Dx:   Encounter Diagnosis     ICD-10-CM    1  Iliotibial band syndrome of left side  M76 32                      Subjective: Patient reports feeling ready for discharge  Pt reports feeling 100% back to desired level of function with no limitations  Goals  STG to be achieved in 4-6 weeks  1) Pt able to sit for > 30 mins without an increase in symptoms -MET   2) Pt able to walk for > 1 mile without an increase in symptoms -MET   3) Pt to report worst pain < 4/10 -MET     LTG to be achieved in 8 weeks   1) Pt able to sit for one hour without an increase in symptoms -MET   2) Pt able to walk 2 miles without an increase in symptoms -MET   3) Pt able to sleep 5/7 nights without discomfort - MET   4) FOTO > expected -MET   5) Hip abd strength to improve 4+/5 -MET     Worst pain: 0/10      Objective:       Palpation     Additional Palpation Details  TTP ITB and piriformis on L     Active Range of Motion     Lumbar   Flexion: Active lumbar flexion: mid shin     Extension:  WFL  Left lateral flexion:  WFL  Right lateral flexion:  WFL  Left rotation:  [facility]  Right rotation:  [facility]    Additional Active Range of Motion Details  Repeated motions do not reproduce symptoms     Joint Play   Joints within functional limits: L1, L2, L3, L4 and L5     Strength/Myotome Testing     Lumbar   Left   Heel walk: normal  Toe walk: normal    Right   Heel walk: normal  Toe walk: normal    Left Hip   Planes of Motion   Flexion: 5  Abduction: 4+  External rotation: 5  Internal rotation: 5    Right Hip   Planes of Motion   Flexion: 5  Abduction: 4+  External rotation: 5  Internal rotation: 5    Left Knee   Flexion: 5  Extension: 5    Right Knee   Flexion: 5  Extension: 5    Left Ankle/Foot   Plantar flexion: 5    Right Ankle/Foot   Plantar flexion: 5      Tests     Additional Tests Details  (-) Arnel (-) SLR B    General Comments:      Lumbar Comments  L 30+ sec R 30+ sec       Assessment: Patient reports perceived improvement of 100%  Functional outcome status measure improved from 59 to 98 at last taking  Pain levels have reduced to 0/10  Patient is able to functionally complete all desired activities and is being discharged to [facility]  Initial eval: Hunter Lowry is a 75 y o  female who presents with L lateral thigh discomfort along ITB distribution, most likely due to sciatic nerve tension and irritability with prolonged sitting and pressure  Patient's current impairments include TTP along piriformis and ITB, + nerve tension, and decreased hip abd strength  Functionally, patient is limited in her ability to sit for prolonged periods of time, walk for > 1 mile, or sleep without discomfort  Patient would benefit from skilled physical therapy to address the impairments, improve their level of function, and to improve their overall quality of life        Plan: Discharge     Precautions: none     1:1 with -310  Pt assessed today   Manuals 2/8 2/13 2/16 2/20 2/22        STM piriformis and ITB KOFI KOFI KOFI                                                 Neuro Re-Ed             Clamshells  3x10 B  3x10 B 3x10 RTB          Band walks    RTB 6x6ft  RTB 8x6ft         SLR  3x10 3x10          Standing hip abd   3x10 2# 3x10 2 5# 3x10         Standing hip ext  3x10 2# 3x10 2 5# 3x10         SLS   4x20s                       Ther Ex             Bridges HEP 3x10 3x10 3x10 stag stance         Treadmill walking             Prone hip ext              Piriformis stretch   3x15"   3x15"         Leg press  50# 3x10 55# 3x10 60# 3x10         Knee flexion  22# 3x10 22# 3x10 22# 3x10         Knee extension   22# 3x10 22# 3x10 22# 3x10         Bike   6' L3  6' L3 6' L3 6' L3                     Ther Activity             Squats at bar   3x10  3x10 3x10         Step up     8" 2x10 ea LE         Lateral step ups     8" 10x ea LE Patient Education             Sleeping positions, standing every 20-30 minutes, sitting positioning 10'                         Modalities

## 2023-03-28 ENCOUNTER — TELEPHONE (OUTPATIENT)
Dept: GASTROENTEROLOGY | Facility: CLINIC | Age: 68
End: 2023-03-28

## 2023-05-10 ENCOUNTER — TELEPHONE (OUTPATIENT)
Dept: GASTROENTEROLOGY | Facility: CLINIC | Age: 68
End: 2023-05-10

## 2023-05-10 NOTE — TELEPHONE ENCOUNTER
Pt's appt from 5/10/2023 is canx with Dhruv Love due to provider is sick  Please r/s appt when time is available

## 2023-05-18 ENCOUNTER — OFFICE VISIT (OUTPATIENT)
Dept: GASTROENTEROLOGY | Facility: CLINIC | Age: 68
End: 2023-05-18

## 2023-05-18 VITALS
SYSTOLIC BLOOD PRESSURE: 133 MMHG | BODY MASS INDEX: 38.63 KG/M2 | HEIGHT: 61 IN | HEART RATE: 77 BPM | DIASTOLIC BLOOD PRESSURE: 80 MMHG | WEIGHT: 204.6 LBS

## 2023-05-18 DIAGNOSIS — Z98.84 STATUS POST BARIATRIC SURGERY: ICD-10-CM

## 2023-05-18 DIAGNOSIS — K44.9 HIATAL HERNIA: ICD-10-CM

## 2023-05-18 DIAGNOSIS — R13.19 ESOPHAGEAL DYSPHAGIA: Primary | ICD-10-CM

## 2023-05-18 DIAGNOSIS — K22.4 ESOPHAGEAL DYSMOTILITY: ICD-10-CM

## 2023-05-18 DIAGNOSIS — K21.9 GASTROESOPHAGEAL REFLUX DISEASE WITHOUT ESOPHAGITIS: ICD-10-CM

## 2023-05-18 NOTE — PROGRESS NOTES
[facility] - Outpatient Follow-up Note  Landy Napoles 75 y o  female MRN: [id number]  Encounter: [id number]          ASSESSMENT AND PLAN:      1  Esophageal dysphagia    2  Hiatal hernia    3  Status post bariatric surgery    4  Esophageal dysmotility    5  Gastroesophageal reflux disease without esophagitis    History of GERD, esophageal spasms, esophageal manometry was unrevealing  Much better on current regimen, takes antacid as needed  Antireflux measures reviewed diet discussed, eat smaller portion and try to lose some weight, has had gastric bariatric surgery  ______________________________________________________________________    SUBJECTIVE:      Patient came in for evaluation of her history of dysphagia acid reflux indigestion, she had an EGD done thought of esophageal dysmotility, manometry was done and normal   She has had gastric bypass surgery done, has occasional heartburn indigestion, upper GI suggested tertiary contraction of the esophagus  She is trying to watch her diet eat smaller portion and feeling better  Denies any melena or bleeding, takes occasional antacid  Denies chest pain shortness of breath, bowels are regular  Diet medications more than 10 systems reviewed  Retired, likes to go to [employer]  REVIEW OF SYSTEMS IS OTHERWISE NEGATIVE        Historical Information   Past Medical History:   Diagnosis Date   • Asthma    • Colon polyp    • Crohn's disease ([facility] [address] )      Past Surgical History:   Procedure Laterality Date   • COLONOSCOPY     • ESOPHAGEAL MANOMETRY     • GASTRIC BYPASS LAPAROSCOPIC     • HYSTERECTOMY Bilateral     38years old   • OOPHORECTOMY Bilateral     38years old   • TONSILLECTOMY       Social History   Social History     Substance and Sexual Activity   Alcohol Use Not Currently     Social History     Substance and Sexual Activity   Drug Use Never     Social History     Tobacco Use   Smoking Status Never   Smokeless Tobacco Never "    Family History   Problem Relation Age of Onset   • Coronary artery disease Mother    • Diabetes Father    • Pancreatic cancer Sister 46   • No Known Problems Maternal Grandmother    • No Known Problems Maternal Grandfather    • No Known Problems Paternal Grandmother    • No Known Problems Paternal Grandfather    • No Known Problems Sister    • No Known Problems Maternal Aunt    • No Known Problems Maternal Aunt    • No Known Problems Maternal Aunt    • No Known Problems Maternal Aunt    • No Known Problems Maternal Aunt    • No Known Problems Maternal Aunt    • No Known Problems Paternal Aunt    • No Known Problems Paternal Aunt    • Breast cancer Cousin    • Breast cancer Cousin 48   • Breast cancer Cousin 76   • Colon cancer Neg Hx        Meds/Allergies       Current Outpatient Medications:   •  Omega-3 Fatty Acids (FISH OIL PO)  •  omeprazole (PriLOSEC) 20 mg delayed release capsule  •  rosuvastatin (CRESTOR) 10 MG tablet  •  multivitamin (THERAGRAN) TABS    Allergies   Allergen Reactions   • Biaxin [Clarithromycin] Rash           Objective     Blood pressure 133/80, pulse 77, height 5' 1\" (1 549 m), weight 92 8 kg (204 lb 9 6 oz)  Body mass index is 38 66 kg/m²  PHYSICAL EXAM:      General Appearance:   Alert, cooperative, no distress   HEENT:   Normocephalic, atraumatic, anicteric  Neck:  Supple, symmetrical, trachea midline   Lungs:   Clear to auscultation bilaterally; no rales, rhonchi or wheezing; respirations unlabored    Heart[de-identified]   Regular rate and rhythm; no murmur  Abdomen:   Soft, non-tender, non-distended; normal bowel sounds; no masses, no organomegaly    Genitalia:   Deferred    Rectal:   Deferred    Extremities:  No cyanosis, clubbing or edema    Skin:  No jaundice, rashes, or lesions    Lymph nodes:  No palpable cervical lymphadenopathy        Lab Results:   No visits with results within 1 Day(s) from this visit     Latest known visit with results is:   Hospital Outpatient Visit on " 03/25/2022   Component Date Value   • Case Report 03/25/2022                      Value:Surgical Pathology Report                         Case: [id number]                                   Authorizing Provider:  Tino Malhotra MD             Collected:           03/25/2022 5775              Ordering Location:     [address] [place] [address]      Received:            03/25/2022 [facility]                                                           Pathologist:           Dona Coy MD                                                                 Specimen:    Esophagus, R/O EOE                                                                        • Final Diagnosis 03/25/2022                      Value: This result contains rich text formatting which cannot be displayed here  • Note 03/25/2022                      Value: This result contains rich text formatting which cannot be displayed here  • Additional Information 03/25/2022                      Value: This result contains rich text formatting which cannot be displayed here  • Gross Description 03/25/2022                      Value: This result contains rich text formatting which cannot be displayed here  Radiology Results:   No results found

## 2023-06-05 ENCOUNTER — HOSPITAL ENCOUNTER (OUTPATIENT)
Dept: RADIOLOGY | Age: 68
Discharge: HOME | End: 2023-06-05
Payer: COMMERCIAL

## 2023-06-05 VITALS — HEIGHT: 61 IN | BODY MASS INDEX: 37.57 KG/M2 | WEIGHT: 199 LBS

## 2023-06-05 DIAGNOSIS — Z12.31 ENCOUNTER FOR SCREENING MAMMOGRAM FOR MALIGNANT NEOPLASM OF BREAST: ICD-10-CM

## 2023-06-05 PROCEDURE — 77063 BREAST TOMOSYNTHESIS BI: CPT

## 2023-06-05 PROCEDURE — 77067 SCR MAMMO BI INCL CAD: CPT

## 2023-09-21 ENCOUNTER — TELEPHONE (OUTPATIENT)
Age: 68
End: 2023-09-21

## 2023-09-21 NOTE — TELEPHONE ENCOUNTER
Caller: Nohemi Gonzalez     Doctor/Office: not spa     Call regarding :  Triage     Call was transferred to: Triage

## 2023-09-22 NOTE — TELEPHONE ENCOUNTER
Caller: [facility]    Doctor: New patient     Reason for call: just starting to have back issue we provided [phone] she called and left detailed message. I told her to please be patient and they will call you back soon.      Call back#: [phone]

## 2023-09-25 ENCOUNTER — NURSE TRIAGE (OUTPATIENT)
Dept: PHYSICAL THERAPY | Facility: OTHER | Age: 68
End: 2023-09-25

## 2023-09-25 DIAGNOSIS — M54.50 ACUTE EXACERBATION OF CHRONIC LOW BACK PAIN: Primary | ICD-10-CM

## 2023-09-25 DIAGNOSIS — G89.29 ACUTE EXACERBATION OF CHRONIC LOW BACK PAIN: Primary | ICD-10-CM

## 2023-09-25 NOTE — TELEPHONE ENCOUNTER
Additional Information  • Negative: Is this related to a work injury? • Negative: Is this related to an MVA? • Negative: Are you currently recieving homecare services? • Negative: Has the patient had unexplained weight loss? • Negative: Does the patient have a fever? • Negative: Is the patient experiencing blood in sputum? • Negative: Is the patient experiencing urine retention? • Negative: Is the patient experiencing acute drop foot or paralysis? • Negative: Has the patient experienced major trauma? (fall from height, high speed collision, direct blow to spine) and is also experiencing nausea, light-headedness, or loss of consciousness? • Affirmative: Is this a chronic condition? Background - Initial Assessment  Clinical complaint: left lower back paion which radiates into the buttocks and into the leg to the knee. Numbness and tingling. States this pain has been present for 1 yr and has gotten worse over the 2 weeks. Date of onset: 2 weeks  Frequency of pain: intermittent  Quality of pain: aching, burning, sharp, shooting, stabbing and throbbing    Protocols used: SL AMB COMPREHENSIVE SPINE PROGRAM PROTOCOL    This RN did review in detail the Comprehensive Spine Program and what we can provide for their back pain. Patient is agreeable to being triaged by this RN and would like to proceed with Physical Therapy. Referral was placed for Physical Therapy at the [facility] site. Patients information was sent to the  to make evaluation appointment. Patient made aware that the PT office  will be calling to schedule the appointment. Patient was provided with the phone number to the PT office. No further questions and/or concerns were voiced by the patient at this time. Patient states understanding of the referral that was placed.

## 2023-09-28 ENCOUNTER — EVALUATION (OUTPATIENT)
Dept: PHYSICAL THERAPY | Facility: CLINIC | Age: 68
End: 2023-09-28
Payer: COMMERCIAL

## 2023-09-28 VITALS — HEART RATE: 70 BPM | SYSTOLIC BLOOD PRESSURE: 140 MMHG | DIASTOLIC BLOOD PRESSURE: 84 MMHG

## 2023-09-28 DIAGNOSIS — M54.50 ACUTE EXACERBATION OF CHRONIC LOW BACK PAIN: ICD-10-CM

## 2023-09-28 DIAGNOSIS — G89.29 ACUTE EXACERBATION OF CHRONIC LOW BACK PAIN: ICD-10-CM

## 2023-09-28 PROCEDURE — 97140 MANUAL THERAPY 1/> REGIONS: CPT | Performed by: PHYSICAL THERAPIST

## 2023-09-28 PROCEDURE — 97161 PT EVAL LOW COMPLEX 20 MIN: CPT | Performed by: PHYSICAL THERAPIST

## 2023-09-28 NOTE — PROGRESS NOTES
PT Evaluation     Today's date: 2023  Patient name: Mar Mitchell  : 1955  MRN: [id number]  Referring provider: Maribell Mariano PT  Dx:   Encounter Diagnosis     ICD-10-CM    1. Acute exacerbation of chronic low back pain  M54.50 Ambulatory referral to PT spine    G89.29                      Assessment  Assessment details: Mar Mitchell is a 75 y.o. female who presents with signs and symptoms consistent of acute exacerbation of chronic low back pain. Patient presents with (+) neural tension on the L, tenderness to palpation at piriformis and lumbar paraspinals on the L, decreased hip strength secondary to pain, and pain with PA assessment of lumbar spine. Due to these impairments, patient has difficulty performing activities such as reading in her recliner, rolling over in bed, or walking > 1 mile with the dog. Patient would benefit from skilled physical therapy to address the impairments, improve their level of function, and to improve their overall quality of life.     Impairments: abnormal muscle firing, abnormal or restricted ROM, activity intolerance, pain with function and poor body mechanics  Understanding of Dx/Px/POC: good   Prognosis: good  Prognosis details: Positive prognostic indicators include: absence of observed red flags, positive attitude towards recovery, good understanding of diagnosis and treatment plan   Negative prognostic indicators include: chronic hx of LBP    Goals  STG: To be achieved in 4 -6 weeks  1)Improve lumbar extension spine ROM by 25%   2)Pt able to turn over in bed without an increase in symptoms  3)SLR > 40 deg on L   4)Improve strength in lower extremity by 1/2 MMT    LTG: To be achieved at Discharge  1)Patient is independent with HEP  2)Return to pre-morbid work related activities  3)Pt able to sit for > 30 minutes   4) Pt able to walk for > 1 mile without adverse effects     Plan  Patient would benefit from: skilled physical therapy and PT eval  Planned therapy interventions: manual therapy, neuromuscular re-education, patient education, therapeutic activities, therapeutic exercise and home exercise program  Frequency: 2x/week for 4-6 weeks. Treatment plan discussed with: patient        Subjective Evaluation    History of Present Illness  Mechanism of injury: History: Pt presents with left lower back pain which radiates into the buttocks and into the leg to the knee. States this pain has been present for 1 yr and has gotten worse over the 2 weeks. Sitting is the most uncomfortable as well as turning over in bed. Walking is okay after the first few steps, however, if she walks more than a mile she is in pain. Standing feels good. She feels numbness and tingling down the knee but not distal.     She was previously at PT for ITB pain which continues to be resolved. Aggravating: walking > a mile, sitting for > 20 minutes   Alleviating: Tylenol at night   24 hours: worse as the day goes on   Functional limitations: unable to sit and read or walk > 1 mile   Social support: lives with   Hobbies/occupation: retired; enjoys swimming, walking, reading   Imaging: none    Cancer:  Family history: Yes- sister had pancreatic cancer   Personal history: No  Recent weight loss: No  Night pain: No    Infection:  Recent history of infection: No  Recent or current fever: No    Fracture:  Recent history of trauma: No  History of corticosteroid use: No  History of bone density disorders: No    Cauda Equina:  Recent change in bowel or bladder control: No  Presence of numbness or saddle anesthesia: No  Leg weakness or difficulty walking: Yes -due to pain     Patient Goals  Patient goals for therapy: decreased pain    Pain  Current pain ratin  At best pain ratin  At worst pain ratin  Location: left glute and down to the knee   Quality: dull ache          Objective     Palpation   Left   Tenderness of the erector spinae.      Additional Palpation Details  Piriformis and ITB     Neurological Testing     Sensation     Lumbar   Left   Intact: light touch    Right   Intact: light touch    Active Range of Motion     Lumbar   Flexion:  Restriction level: minimal  Extension: Active lumbar extension: able to complete with min limitation at end of session. with pain Restriction level: maximal  Left lateral flexion:  Restriction level: moderate  Right lateral flexion:  Restriction level: minimal  Left rotation:  Restriction level: moderate  Right rotation:  Restriction level: minimal    Joint Play     Pain: L4 and L5     Strength/Myotome Testing     Lumbar   Left   Heel walk: normal  Toe walk: normal    Right   Heel walk: normal  Toe walk: normal    Left Hip   Planes of Motion   Flexion: 5  Abduction: 3- (painful)  External rotation: 5  Internal rotation: 5    Right Hip   Planes of Motion   Flexion: 5  Abduction: 5  External rotation: 5  Internal rotation: 5    Left Knee   Flexion: 5  Extension: 5    Right Knee   Flexion: 5  Extension: 5    Left Ankle/Foot   Dorsiflexion: 5  Plantar flexion: 4  Great toe extension: 5    Right Ankle/Foot   Dorsiflexion: 5  Plantar flexion: 4  Great toe extension: 5    Tests     Lumbar     Left   Positive passive SLR (30 degrees) and slump test.   Negative crossed SLR. Right   Negative crossed SLR and passive SLR.      General Comments:      Lumbar Comments  Hip PROM WFL and painfree, even with overpressure       Pain and antalgic gait with ~5 steps and then resolves and pt able to ambulate              Precautions: none      Manuals 9/28            PA lumbar mobilizations GII-III             Piriformis and ITB STM 5'                                      Neuro Re-Ed             BKFO HEP            Adductor squeeze HEP            Bridge HEP            TA + straight leg ext                                                    Ther Ex             Bike warm-up             Standing hip ext              LTR             Piriformis stretch Ther Activity             Sit to stand                                                                  Patient education             Breaking up walks, sitting with soft bend of knee on recliner, frequently getting up  [facility]

## 2023-10-03 ENCOUNTER — OFFICE VISIT (OUTPATIENT)
Dept: PHYSICAL THERAPY | Facility: CLINIC | Age: 68
End: 2023-10-03
Payer: COMMERCIAL

## 2023-10-03 DIAGNOSIS — M54.50 ACUTE EXACERBATION OF CHRONIC LOW BACK PAIN: Primary | ICD-10-CM

## 2023-10-03 DIAGNOSIS — G89.29 ACUTE EXACERBATION OF CHRONIC LOW BACK PAIN: Primary | ICD-10-CM

## 2023-10-03 PROCEDURE — 97112 NEUROMUSCULAR REEDUCATION: CPT | Performed by: PHYSICAL THERAPIST

## 2023-10-03 PROCEDURE — 97530 THERAPEUTIC ACTIVITIES: CPT | Performed by: PHYSICAL THERAPIST

## 2023-10-03 PROCEDURE — 97110 THERAPEUTIC EXERCISES: CPT | Performed by: PHYSICAL THERAPIST

## 2023-10-03 NOTE — PROGRESS NOTES
Daily Note     Today's date: 10/3/2023  Patient name: Bryan Herbert  : 1955  MRN: [id number]  Referring provider: Renetta Rice, PT  Dx:   Encounter Diagnosis     ICD-10-CM    1. Acute exacerbation of chronic low back pain  M54.50     G89.29                      Subjective: Pt didn't have any pain from IE on Thursday until Monday, and even Monday wasn't bad. Walked 2 miles today and is feeling great. Objective: See treatment diary below      Assessment: Patient did great with treatment today. Able to perform all exercises without symptoms and progressed quite a bit since initial evaluation. Plan for discharge on Thursday as long as patient continues to feel 100%. Plan: Continue per plan of care.       Precautions: none    1:1 with -255  Manuals 9/28 10/3           PA lumbar mobilizations GII-III             Piriformis and ITB STM 5'                                      Neuro Re-Ed             BKFO HEP            Adductor squeeze HEP            Bridge HEP 3x10            TA + straight leg ext             SLR  2x10           Clamshells  x30 B           TA + straight arm pull down  GTB 3x10                        Ther Ex             Standing hip abd  RTB ankles 3x10           Standing hip ext   RTB 3x10 ankles           Piriformis stretch              Rows  GTB 3x10           Knee flexion   22# 3x10           Knee extension  22# 3x10                        Ther Activity             Sit to stand   2x10                                                               Patient education             Breaking up walks, sitting with soft bend of knee on recliner, frequently getting up  KOFI                         Modalities

## 2023-10-05 ENCOUNTER — OFFICE VISIT (OUTPATIENT)
Dept: PHYSICAL THERAPY | Facility: CLINIC | Age: 68
End: 2023-10-05
Payer: COMMERCIAL

## 2023-10-05 DIAGNOSIS — M54.50 ACUTE EXACERBATION OF CHRONIC LOW BACK PAIN: Primary | ICD-10-CM

## 2023-10-05 DIAGNOSIS — G89.29 ACUTE EXACERBATION OF CHRONIC LOW BACK PAIN: Primary | ICD-10-CM

## 2023-10-05 PROCEDURE — 97110 THERAPEUTIC EXERCISES: CPT | Performed by: PHYSICAL THERAPIST

## 2023-10-05 NOTE — PROGRESS NOTES
PT Discharge     Today's date: 10/5/2023  Patient name: Rose Ardon  : 1955  MRN: [id number]  Referring provider: Michael Bird PT  Dx:   Encounter Diagnosis     ICD-10-CM    1. Acute exacerbation of chronic low back pain  M54.50     G89.29                      Subjective: Pt reports feeling 100% and back to desired function. Ready for discharge. Objective: Lumbar and hip ROM WFL and no pain. Gross strength 4+/5 for lower extremity        Assessment: Patient reports perceived improvement of 100%. Functional outcome status measure improved from 57 to 94 at last taking. Pain levels have reduced to 1/10 at worst. Pt still has difficulty with sitting > 3-4 hours at a time but is able to functionally complete all desired tasks. Plan: Continue per plan of care.       Precautions: none    1:1 with -235   Manuals 9/28 10/3 10/5          PA lumbar mobilizations GII-III             Piriformis and ITB STM 5'                                      Neuro Re-Ed             BKFO HEP            Adductor squeeze HEP            Bridge HEP 3x10            TA + straight leg ext             SLR  2x10           Clamshells  x30 B           TA + straight arm pull down  GTB 3x10                        Ther Ex             Standing hip abd  RTB ankles 3x10           Standing hip ext   RTB 3x10 ankles           Piriformis stretch              Rows  GTB 3x10           Knee flexion   22# 3x10 22# 3x10          Knee extension  22# 3x10 22# 3x10          Leg press    70# 3x10 B  35# SL 3x10          Bike   5 min                       Ther Activity             Sit to stand   2x10                                                               Patient education             Breaking up walks, sitting with soft bend of knee on recliner, frequently getting up  KOFI                         Modalities

## 2023-10-10 ENCOUNTER — APPOINTMENT (OUTPATIENT)
Dept: PHYSICAL THERAPY | Facility: CLINIC | Age: 68
End: 2023-10-10
Payer: COMMERCIAL

## 2023-10-12 ENCOUNTER — APPOINTMENT (OUTPATIENT)
Dept: PHYSICAL THERAPY | Facility: CLINIC | Age: 68
End: 2023-10-12
Payer: COMMERCIAL

## 2023-10-12 ENCOUNTER — TELEPHONE (OUTPATIENT)
Dept: PHYSICAL THERAPY | Facility: OTHER | Age: 68
End: 2023-10-12

## 2023-10-12 NOTE — TELEPHONE ENCOUNTER
Comp Spine Triage Call Backs    Checked patient's chart to make sure her PT eval ws scheduled.  Pthad her PT eval on 9/28/23 with follow up appointments made

## 2023-10-17 ENCOUNTER — APPOINTMENT (OUTPATIENT)
Dept: PHYSICAL THERAPY | Facility: CLINIC | Age: 68
End: 2023-10-17
Payer: COMMERCIAL

## 2023-10-19 ENCOUNTER — APPOINTMENT (OUTPATIENT)
Dept: PHYSICAL THERAPY | Facility: CLINIC | Age: 68
End: 2023-10-19
Payer: COMMERCIAL

## 2023-11-13 ENCOUNTER — OFFICE VISIT (OUTPATIENT)
Dept: PHYSICAL THERAPY | Facility: CLINIC | Age: 68
End: 2023-11-13
Payer: COMMERCIAL

## 2023-11-13 DIAGNOSIS — M54.50 ACUTE LEFT-SIDED LOW BACK PAIN, UNSPECIFIED WHETHER SCIATICA PRESENT: Primary | ICD-10-CM

## 2023-11-13 PROCEDURE — 97161 PT EVAL LOW COMPLEX 20 MIN: CPT | Performed by: PHYSICAL THERAPIST

## 2023-11-13 PROCEDURE — 97110 THERAPEUTIC EXERCISES: CPT | Performed by: PHYSICAL THERAPIST

## 2023-11-13 NOTE — PROGRESS NOTES
PT Evaluation - Direct Access    Today's date: 2023  Patient name: Catherine Murphy  : 1955  MRN: [id number]  Referring provider: Betzaida Canela PT  Dx:   Encounter Diagnosis     ICD-10-CM    1. Acute left-sided low back pain, unspecified whether sciatica present  M54.50                      Assessment  Assessment details: Catherine Murphy is a 75 y.o. female who presents with signs and symptoms consistent of acute exacerbation of chronic low back pain. Patient presents with pain, decreased ROM, decreased joint mobility, ambulatory dysfunction, and postural dysfunction. Due to these impairments, patient has difficulty performing a/iadls, recreational activities, and engaging in social activities. Patient would benefit from skilled physical therapy to address the impairments, improve their level of function, and to improve their overall quality of life.     Impairments: abnormal muscle firing, abnormal or restricted ROM, activity intolerance, pain with function and poor body mechanics  Understanding of Dx/Px/POC: good   Prognosis: good  Prognosis details: Positive prognostic indicators include: absence of observed red flags, positive attitude towards recovery, good understanding of diagnosis and treatment plan   Negative prognostic indicators include: chronic hx of LBP     Goals  STG: To be achieved in 4 -6 weeks  1)Painfree rotation and lateral flexion to L side   2)Reduce pain by 50%  3)Able to stand after sitting with <2/10 discomfort     LTG: To be achieved at Discharge  1)Patient is independent with HEP  2)Return to pre-morbid work related activities  3)Improve tolerance to prolonged sitting, standing, and walking to prior level of function    Plan  Patient would benefit from: skilled physical therapy  Planned therapy interventions: manual therapy, neuromuscular re-education, patient education, therapeutic activities, therapeutic exercise and home exercise program  Frequency: 2x/week for 4-6 weeks.  Treatment plan discussed with: patient        Subjective Evaluation    History of Present Illness  Mechanism of injury: History: Pt reports that her leg flared up about two weeks ago. She went to bed fine one night and then rolled over in bed in the middle of the night and felt a lot of pain in her back. Pain is not radiating down the leg and is only in the left lower back. Standing feels better than sitting and laying down. She was at PT before for a similar issue which completely resolved. She has kept up with her HEP but notes discomfort while performing. Aggravating: standing after sitting for a prolonged period of time, staisr  Alleviating: Tylenol  Sleeping: Unable to sleep without significant pain   Social support: lives with    Hobbies/occupation: retired; enjoys cooking and walking   Imaging: none   Red flags: Sara Richards denies a new onset of Bladder incontinence, Bowel dysfunction, Dizziness, Dysphagia, Nausea, Ataxia, History of cancer, Tingling, Numbness, and Saddle anesthesia     Patient Goals  Patient goals for therapy: decreased pain and independence with ADLs/IADLs    Pain  Current pain ratin  At best pain rating: 3  At worst pain rating: 10  Quality: dull ache and sharp          Objective     Palpation   Left   No palpable tenderness to the erector spinae and quadratus lumborum. Right   No palpable tenderness to the erector spinae and quadratus lumborum. Tenderness     Left Hip   Tenderness in the PSIS. Neurological Testing     Sensation     Lumbar   Left   Intact: light touch    Right   Intact: light touch    Active Range of Motion     Lumbar   Flexion: Active lumbar flexion: distal shin.    Extension: Active lumbar extension: pain wtih return to neutral.  with pain Restriction level: minimal  Left lateral flexion:  with pain Restriction level: moderate  Right lateral flexion:  Restriction level: minimal  Left rotation:  with pain Restriction level: moderate  Right rotation:  Restriction level: minimal    Additional Active Range of Motion Details  Slight shift right shoulders over right hip     Joint Play     Hypomobile: L3, L4 and L5     Pain: L5 and S1     Strength/Myotome Testing     Lumbar   Left   Heel walk: normal  Toe walk: normal    Right   Heel walk: normal  Toe walk: normal    Left Hip   Planes of Motion   Flexion: 5  Abduction: 4-  External rotation: 4+  Internal rotation: 4+    Right Hip   Planes of Motion   Flexion: 5  Abduction: 4-  External rotation: 4+  Internal rotation: 4+    Left Knee   Flexion: 5  Extension: 5    Right Knee   Flexion: 5  Extension: 5    Left Ankle/Foot   Dorsiflexion: 5  Great toe extension: 5    Right Ankle/Foot   Dorsiflexion: 5  Great toe extension: 5    Tests     Lumbar     Left   Negative crossed SLR, passive SLR and slump test.     Right   Negative crossed SLR, passive SLR and slump test.     General Comments:      Lumbar Comments  Hip PROM: discomfort left low back with left hip IR and right hip end-range flexion              Precautions: Crohn's     Re-Eval: 12/13   Authorization: Chava Almazan Access Code:  Javad Gandhi    1:1 with PT: 815-9   Visit Count 1 2 3 4 5   Date:  11/13       Manuals        PA Lumbar mobilizations L3-5 GI-II KOFI                       Neuro Re-Ed        Pelvic tilting PPT HEP  x10       Standing extensions HEP       BKFO        Standing hip ext        Education                        There Ex         Active warm up  Bike      Pball rolls  HEP       LTR HEP  x10       Piriformis stretch HEP                                              Ther Act             Sit to stand                                                                     Modalities

## 2023-11-16 ENCOUNTER — OFFICE VISIT (OUTPATIENT)
Dept: PHYSICAL THERAPY | Facility: CLINIC | Age: 68
End: 2023-11-16
Payer: COMMERCIAL

## 2023-11-16 DIAGNOSIS — M54.50 ACUTE LEFT-SIDED LOW BACK PAIN, UNSPECIFIED WHETHER SCIATICA PRESENT: Primary | ICD-10-CM

## 2023-11-16 PROCEDURE — 97112 NEUROMUSCULAR REEDUCATION: CPT | Performed by: PHYSICAL THERAPIST

## 2023-11-16 PROCEDURE — 97110 THERAPEUTIC EXERCISES: CPT | Performed by: PHYSICAL THERAPIST

## 2023-11-16 NOTE — PROGRESS NOTES
Daily Note     Today's date: 2023  Patient name: Izaiah Moreno  : 1955  MRN: [id number]  Referring provider: Ralph Kim, PT  Dx:   Encounter Diagnosis     ICD-10-CM    1. Acute left-sided low back pain, unspecified whether sciatica present  M54.50                      Subjective: Patient reports feeling great after IE and has felt great since. She has no issues with sleeping anymore. Objective: See treatment diary below      Assessment: Patient did great with treatment today. No pain noted during and had good form throughout. Plan to follow-up with patient in two weeks based on progress made. Plan: Continue per plan of care.       Precautions: Crohn's     Re-Eval:    Authorization: Ramandeep Rosas Access Code:  Stiven Lopez    1:1 with PT: 783-9   Visit Count 1 2 3 4 5   Date:        Manuals        PA Lumbar mobilizations L3-5 GI-II KOFI                       Neuro Re-Ed        Pelvic tilting PPT HEP  x10 3x10 bridges      Standing extensions HEP       BKFO        Standing hip ext  3x10      Education        Supine clamshells  BTB 3x10              There Ex         Active warm up  Bike 6 min      Pball rolls  HEP       LTR HEP  x10       Piriformis stretch HEP       Leg press  70# 3x10                                     Ther Act             Sit to stand                                                                     Modalities

## 2023-11-28 ENCOUNTER — APPOINTMENT (OUTPATIENT)
Dept: PHYSICAL THERAPY | Facility: CLINIC | Age: 68
End: 2023-11-28
Payer: COMMERCIAL

## 2023-11-28 ENCOUNTER — OFFICE VISIT (OUTPATIENT)
Dept: DERMATOLOGY | Facility: CLINIC | Age: 68
End: 2023-11-28
Payer: COMMERCIAL

## 2023-11-28 VITALS — HEIGHT: 62 IN | BODY MASS INDEX: 35.88 KG/M2 | TEMPERATURE: 97.5 F | WEIGHT: 195 LBS

## 2023-11-28 DIAGNOSIS — L81.4 LENTIGO: ICD-10-CM

## 2023-11-28 DIAGNOSIS — D18.01 CHERRY ANGIOMA: ICD-10-CM

## 2023-11-28 DIAGNOSIS — L82.1 SEBORRHEIC KERATOSIS: ICD-10-CM

## 2023-11-28 DIAGNOSIS — D22.9 MULTIPLE MELANOCYTIC NEVI: Primary | ICD-10-CM

## 2023-11-28 DIAGNOSIS — L91.8 ACHROCHORDON: ICD-10-CM

## 2023-11-28 DIAGNOSIS — L85.3 XEROSIS OF SKIN: ICD-10-CM

## 2023-11-28 PROCEDURE — 99213 OFFICE O/P EST LOW 20 MIN: CPT | Performed by: DERMATOLOGY

## 2023-11-28 NOTE — PROGRESS NOTES
[facility] Note     Patient Name: Bryan Herbert  Encounter Date: 11/28/2023     Have you been cared for by a [facility] in the last 3 years and, if so, which description applies to you? Yes. I have been here within the last 3 years, and my medical history has NOT changed since that time. I am FEMALE/of child-bearing potential.    REVIEW OF SYSTEMS:  Have you recently had or currently have any of the following? No changes in my recent health. PAST MEDICAL HISTORY:  Have you personally ever had or currently have any of the following? If "YES," then please provide more detail. No changes in my medical history. HISTORY OF IMMUNOSUPPRESSION: Do you have a history of any of the following:  Systemic Immunosuppression such as Diabetes, Biologic or Immunotherapy, Chemotherapy, Organ Transplantation, Bone Marrow Transplantation? No     Answering "YES" requires the addition of the dotphrase "IMMUNOSUPPRESSED" as the first diagnosis of the patient's visit. FAMILY HISTORY:  Any "first degree relatives" (parent, brother, sister, or child) with the following? No changes in my family's known health. PATIENT EXPERIENCE:    Do you want the Dermatologist to perform a COMPLETE skin exam today including a clinical examination under the "bra and underwear" areas? NO  If necessary, do we have your permission to call and leave a detailed message on your Preferred Phone number that includes your specific medical information?   Yes      Allergies   Allergen Reactions    Biaxin [Clarithromycin] Rash      Current Outpatient Medications:     multivitamin (THERAGRAN) TABS, Take 1 tablet by mouth daily (Patient not taking: Reported on 5/18/2023), Disp: , Rfl:     Omega-3 Fatty Acids (FISH OIL PO), Take by mouth, Disp: , Rfl:     omeprazole (PriLOSEC) 20 mg delayed release capsule, Take 1 capsule (20 mg total) by mouth daily, Disp: 30 capsule, Rfl: 0    rosuvastatin (CRESTOR) 10 MG tablet, , Disp: , Rfl:           Whom besides the patient is providing clinical information about today's encounter? NO ADDITIONAL HISTORIAN (patient alone provided history)    Physical Exam and Assessment/Plan by Diagnosis:    MELANOCYTIC NEVI ("Moles")    Physical Exam:  Anatomic Location Affected:   Mostly on sun-exposed areas of the trunk and extremities  Morphological Description:  Scattered, 1-4mm round to ovoid, symmetrical-appearing, even bordered, skin colored to dark brown macules/papules, mostly in sun-exposed areas  Pertinent Positives:  Pertinent Negatives: Additional History of Present Condition:      Assessment and Plan:  Based on a thorough discussion of this condition and the management approach to it (including a comprehensive discussion of the known risks, side effects and potential benefits of treatment), the patient (family) agrees to implement the following specific plan:  When outside we recommend using a wide brim hat, sunglasses, long sleeve and pants, sunscreen with SPF 69+ with reapplication every 2 hours, or SPF specific clothing   Benign, reassured  Annual skin check     Melanocytic Nevi  Melanocytic nevi ("moles") are tan or brown, raised or flat areas of the skin which have an increased number of melanocytes. Melanocytes are the cells in our body which make pigment and account for skin color. Some moles are present at birth (I.e., "congenital nevi"), while others come up later in life (i.e., "acquired nevi"). The sun can stimulate the body to make more moles. Sunburns are not the only thing that triggers more moles. Chronic sun exposure can do it too. Clinically distinguishing a healthy mole from melanoma may be difficult, even for experienced dermatologists. The "ABCDE's" of moles have been suggested as a means of helping to alert a person to a suspicious mole and the possible increased risk of melanoma.   The suggestions for raising alert are as follows:    Asymmetry: Healthy moles tend to be symmetric, while melanomas are often asymmetric. Asymmetry means if you draw a line through the mole, the two halves do not match in color, size, shape, or surface texture. Asymmetry can be a result of rapid enlargement of a mole, the development of a raised area on a previously flat lesion, scaling, ulceration, bleeding or scabbing within the mole. Any mole that starts to demonstrate "asymmetry" should be examined promptly by a board certified dermatologist.     Border: Healthy moles tend to have discrete, even borders. The border of a melanoma often blends into the normal skin and does not sharply delineate the mole from normal skin. Any mole that starts to demonstrate "uneven borders" should be examined promptly by a board certified dermatologist.     Color: Healthy moles tend to be one color throughout. Melanomas tend to be made up of different colors ranging from dark black, blue, white, or red. Any mole that demonstrates a color change should be examined promptly by a board certified dermatologist.     Diameter: Healthy moles tend to be smaller than 0.6 cm in size; an exception are "congenital nevi" that can be larger. Melanomas tend to grow and can often be greater than 0.6 cm (1/4 of an inch, or the size of a pencil eraser). This is only a guideline, and many normal moles may be larger than 0.6 cm without being unhealthy. Any mole that starts to change in size (small to bigger or bigger to smaller) should be examined promptly by a board certified dermatologist.     Evolving: Healthy moles tend to "stay the same."  Melanomas may often show signs of change or evolution such as a change in size, shape, color, or elevation.   Any mole that starts to itch, bleed, crust, burn, hurt, or ulcerate or demonstrate a change or evolution should be examined promptly by a board certified dermatologist.        LENTIGO    Physical Exam:  Anatomic Location Affected:  trunk, arms  Morphological Description:  Light brown macules  Pertinent Positives:  Pertinent Negatives: Additional History of Present Condition:      Assessment and Plan:  Based on a thorough discussion of this condition and the management approach to it (including a comprehensive discussion of the known risks, side effects and potential benefits of treatment), the patient (family) agrees to implement the following specific plan:  When outside we recommend using a wide brim hat, sunglasses, long sleeve and pants, sunscreen with SPF 96+ with reapplication every 2 hours, or SPF specific clothing   Patient will send us a My Chart message with the compounding medications so that we can send a new prescription in. It is recommended that this medication should be used 3 months on, 3 months off. What is a lentigo? A lentigo is a pigmented flat or slightly raised lesion with a clearly defined edge. Unlike an ephelis (freckle), it does not fade in the winter months. There are several kinds of lentigo. The name lentigo originally referred to its appearance resembling a small lentil. The plural of lentigo is lentigines, although “lentigos” is also in common use. Who gets lentigines? Lentigines can affect males and females of all ages and races. Solar lentigines are especially prevalent in fair skinned adults. Lentigines associated with syndromes are present at birth or arise during childhood. What causes lentigines? Common forms of lentigo are due to exposure to ultraviolet radiation:  Sun damage including sunburn   Indoor tanning   Phototherapy, especially photochemotherapy (PUVA)    Ionizing radiation, eg radiation therapy, can also cause lentigines. Several familial syndromes associated with widespread lentigines originate from mutations in Yassine-MAP kinase, mTOR signaling and PTEN pathways. What is the treatment for lentigines? Most lentigines are left alone. Attempts to lighten them may not be successful.  The following approaches are used:  SPF 50+ broad-spectrum sunscreen   Hydroquinone bleaching cream   Alpha hydroxy acids   Vitamin C   Retinoids   Azelaic acid   Chemical peels  Individual lesions can be permanently removed using:  Cryotherapy   Intense pulsed light   Pigment lasers    How can lentigines be prevented? Lentigines associated with exposure ultraviolet radiation can be prevented by very careful sun protection. Clothing is more successful at preventing new lentigines than are sunscreens. What is the outlook for lentigines? Lentigines usually persist. They may increase in number with age and sun exposure. Some in sun-protected sites may fade and disappear. CHERRY ANGIOMAS    Physical Exam:  Anatomic Location Affected:  trunk  Morphological Description:  Scattered cherry red, 1-4 mm papules. Pertinent Positives:  Pertinent Negatives: Additional History of Present Condition:      Assessment and Plan:  Based on a thorough discussion of this condition and the management approach to it (including a comprehensive discussion of the known risks, side effects and potential benefits of treatment), the patient (family) agrees to implement the following specific plan:  Monitor for changes  Benign, reassured      Assessment and Plan:    Cherry angioma, also known as Nel Cisco spots, are benign vascular skin lesions. A "cherry angioma" is a firm red, blue or purple papule, 0.1-1 cm in diameter. When thrombosed, they can appear black in colour until evaluated with a dermatoscope when the red or purple colour is more easily seen. Cherry angioma may develop on any part of the body but most often appear on the scalp, face, lips and trunk. An angioma is due to proliferating endothelial cells; these are the cells that line the inside of a blood vessel. Angiomas can arise in early life or later in life; the most common type of angioma is a cherry angioma.   Cherry angiomas are very common in males and females of any age or race. They are more noticeable in white skin than in skin of colour. They markedly increase in number from about the age of 36. There may be a family history of similar lesions. Eruptive cherry angiomas have been rarely reported to be associated with internal malignancy. The cause of angiomas is unknown. Genetic analysis of cherry angiomas has shown that they frequently carry specific somatic missense mutations in the GNAQ and GNA11 (Q209H) genes, which are involved in other vascular and melanocytic proliferations. SEBORRHEIC KERATOSIS; NON-INFLAMED    Physical Exam:  Anatomic Location Affected:  trunk  Morphological Description:  Flat and raised, waxy, smooth to warty textured, yellow to brownish-grey to dark brown to blackish, discrete, "stuck-on" appearing papules. Pertinent Positives:  Pertinent Negatives: Additional History of Present Condition:      Assessment and Plan:  Based on a thorough discussion of this condition and the management approach to it (including a comprehensive discussion of the known risks, side effects and potential benefits of treatment), the patient (family) agrees to implement the following specific plan:  Monitor for changes  Benign, reassured      Seborrheic Keratosis  A seborrheic keratosis is a harmless warty spot that appears during adult life as a common sign of skin aging. Seborrheic keratoses can arise on any area of skin, covered or uncovered, with the usual exception of the palms and soles. They do not arise from mucous membranes. Seborrheic keratoses can have highly variable appearance. Seborrheic keratoses are extremely common. It has been estimated that over 90% of adults over the age of 61 years have one or more of them. They occur in males and females of all races, typically beginning to erupt in the 35s or 45s. They are uncommon under the age of 21 years. The precise cause of seborrhoeic keratoses is not known.   Seborrhoeic keratoses are considered degenerative in nature. As time goes by, seborrheic keratoses tend to become more numerous. Some people inherit a tendency to develop a very large number of them; some people may have hundreds of them. There is no easy way to remove multiple lesions on a single occasion. Unless a specific lesion is "inflamed" and is causing pain or stinging/burning or is bleeding, most insurance companies do not authorize treatment. XEROSIS ("DRY SKIN")    Physical Exam:  Anatomic Location Affected:  diffuse  Morphological Description:  xerosis  Pertinent Positives:  Pertinent Negatives: Additional History of Present Condition:      Assessment and Plan:  Based on a thorough discussion of this condition and the management approach to it (including a comprehensive discussion of the known risks, side effects and potential benefits of treatment), the patient (family) agrees to implement the following specific plan:  Use moisturizer like Eucerin,Cerave or Aveeno Cream 3 times a day for the dry skin            Dry skin refers to skin that feels dry to touch. Dry skin has a dull surface with a rough, scaly quality. The skin is less pliable and cracked. When dryness is severe, the skin may become inflamed and fissured. Although any body site can be dry, dry skin tends to affect the shins more than any other site. Dry skin is lacking moisture in the outer horny cell layer (stratum corneum) and this results in cracks in the skin surface. Dry skin is also called xerosis, xeroderma or asteatosis (lack of fat). It can affect males and females of all ages. There is some racial variability in water and lipid content of the skin. Dry skin that starts in early childhood may be one of about 20 types of ichthyosis (fish-scale skin). There is often a family history of dry skin. Dry skin is commonly seen in people with atopic dermatitis. Nearly everyone > 60 years has dry skin.     Dry skin that begins later may be seen in people with certain diseases and conditions. Postmenopausal women  Hypothyroidism  Chronic renal disease   Malnutrition and weight loss   Subclinical dermatitis   Treatment with certain drugs such as oral retinoids, diuretics and epidermal growth factor receptor inhibitors      What is the treatment for dry skin? The mainstay of treatment of dry skin and ichthyosis is moisturisers/emollients. They should be applied liberally and often enough to:  Reduce itch   Improve the barrier function   Prevent entry of irritants, bacteria   Reduce transepidermal water loss. How can dry skin be prevented? Eliminate aggravating factors:  Reduce the frequency of bathing. A humidifier in winter and air conditioner in summer   Compare having a short shower with a prolonged soak in a bath. Use lukewarm, not hot, water. Replace standard soap with a substitute such as a synthetic detergent cleanser, water-miscible emollient, bath oil, anti-pruritic tar oil, colloidal oatmeal etc.   Apply an emollient liberally and often, particularly shortly after bathing, and when itchy. The drier the skin, the thicker this should be, especially on the hands. What is the outlook for dry skin? A tendency to dry skin may persist life-long, or it may improve once contributing factors are controlled. ACROCHORDON ("SKIN TAG")    Physical Exam:  Anatomic Location Affected:  Right axilla  Morphological Description:  Skin colored and brown pedunculated papules   Pertinent Positives:  Pertinent Negatives:     Additional History of Present Condition:  Found on exam    Assessment and Plan:  Based on a thorough discussion of this condition and the management approach to it (including a comprehensive discussion of the known risks, side effects and potential benefits of treatment), the patient (family) agrees to implement the following specific plan:  Benign, assurance provided     Skin tags are common, soft, harmless skin lesions that are also called, in the appropriate settings, papillomas, fibroepithelial polyps, and soft fibromas. They are made up of loosely arranged collagen fibers and blood vessels surrounded by a thickened or thinned-out epidermis. Skin tags tend to develop in both men and women as we grow older. They are usually found on the skin folds (neck, armpits, groin). It is not known what specifically causes skin tags. Certain factors, though, do appear to play a role:  Chaffing and irritation from skin rubbing together  High levels of growth factors (as seen, for example, in pregnancy or in acromegaly/gigantism)  Insulin resistance  Human papillomavirus (wart virus)    We discussed that most skin tags do not need to be treated unless they are specifically causing the patient physical distress or limitation or pose a risk for a larger problem such as an infection that forms secondary to excoriation or chronic irritation.     We had a thorough discussion of treatment options and specific risks (including that any procedural treatment may not be covered by insurance and would then be the patient's responsibility) and benefits/alternatives including but not limited to the following:  Cryotherapy (freezing)  Shave removal  Surgical excision (snip excision with scissors)  Electrosurgery  Ligation (we do not do this procedure and counseled against it due to risk of tissue necrosis and infection)     Scribe Attestation      I,:  Pedro Carlisle am acting as a scribe while in the presence of the attending physician.:       I,:  Rosalee Mcdowell MD personally performed the services described in this documentation    as scribed in my presence.:

## 2023-11-28 NOTE — PATIENT INSTRUCTIONS
MELANOCYTIC NEVI ("Moles")    Physical Exam:  Anatomic Location Affected:   Mostly on sun-exposed areas of the trunk and extremities  Morphological Description:  Scattered, 1-4mm round to ovoid, symmetrical-appearing, even bordered, skin colored to dark brown macules/papules, mostly in sun-exposed areas  Pertinent Positives:  Pertinent Negatives: Additional History of Present Condition:      Assessment and Plan:  Based on a thorough discussion of this condition and the management approach to it (including a comprehensive discussion of the known risks, side effects and potential benefits of treatment), the patient (family) agrees to implement the following specific plan:  When outside we recommend using a wide brim hat, sunglasses, long sleeve and pants, sunscreen with SPF 83+ with reapplication every 2 hours, or SPF specific clothing   Benign, reassured  Annual skin check     Melanocytic Nevi  Melanocytic nevi ("moles") are tan or brown, raised or flat areas of the skin which have an increased number of melanocytes. Melanocytes are the cells in our body which make pigment and account for skin color. Some moles are present at birth (I.e., "congenital nevi"), while others come up later in life (i.e., "acquired nevi"). The sun can stimulate the body to make more moles. Sunburns are not the only thing that triggers more moles. Chronic sun exposure can do it too. Clinically distinguishing a healthy mole from melanoma may be difficult, even for experienced dermatologists. The "ABCDE's" of moles have been suggested as a means of helping to alert a person to a suspicious mole and the possible increased risk of melanoma. The suggestions for raising alert are as follows:    Asymmetry: Healthy moles tend to be symmetric, while melanomas are often asymmetric. Asymmetry means if you draw a line through the mole, the two halves do not match in color, size, shape, or surface texture.  Asymmetry can be a result of rapid enlargement of a mole, the development of a raised area on a previously flat lesion, scaling, ulceration, bleeding or scabbing within the mole. Any mole that starts to demonstrate "asymmetry" should be examined promptly by a board certified dermatologist.     Border: Healthy moles tend to have discrete, even borders. The border of a melanoma often blends into the normal skin and does not sharply delineate the mole from normal skin. Any mole that starts to demonstrate "uneven borders" should be examined promptly by a board certified dermatologist.     Color: Healthy moles tend to be one color throughout. Melanomas tend to be made up of different colors ranging from dark black, blue, white, or red. Any mole that demonstrates a color change should be examined promptly by a board certified dermatologist.     Diameter: Healthy moles tend to be smaller than 0.6 cm in size; an exception are "congenital nevi" that can be larger. Melanomas tend to grow and can often be greater than 0.6 cm (1/4 of an inch, or the size of a pencil eraser). This is only a guideline, and many normal moles may be larger than 0.6 cm without being unhealthy. Any mole that starts to change in size (small to bigger or bigger to smaller) should be examined promptly by a board certified dermatologist.     Evolving: Healthy moles tend to "stay the same."  Melanomas may often show signs of change or evolution such as a change in size, shape, color, or elevation. Any mole that starts to itch, bleed, crust, burn, hurt, or ulcerate or demonstrate a change or evolution should be examined promptly by a board certified dermatologist.        LENTIGO    Physical Exam:  Anatomic Location Affected:  trunk, arms  Morphological Description:  Light brown macules  Pertinent Positives:  Pertinent Negatives:     Additional History of Present Condition:      Assessment and Plan:  Based on a thorough discussion of this condition and the management approach to it (including a comprehensive discussion of the known risks, side effects and potential benefits of treatment), the patient (family) agrees to implement the following specific plan:  When outside we recommend using a wide brim hat, sunglasses, long sleeve and pants, sunscreen with SPF 10+ with reapplication every 2 hours, or SPF specific clothing   Patient will send us a My Chart message with the compounding medications so that we can send a new prescription in. It is recommended that this medication should be used 3 months on, 3 months off. What is a lentigo? A lentigo is a pigmented flat or slightly raised lesion with a clearly defined edge. Unlike an ephelis (freckle), it does not fade in the winter months. There are several kinds of lentigo. The name lentigo originally referred to its appearance resembling a small lentil. The plural of lentigo is lentigines, although “lentigos” is also in common use. Who gets lentigines? Lentigines can affect males and females of all ages and races. Solar lentigines are especially prevalent in fair skinned adults. Lentigines associated with syndromes are present at birth or arise during childhood. What causes lentigines? Common forms of lentigo are due to exposure to ultraviolet radiation:  Sun damage including sunburn   Indoor tanning   Phototherapy, especially photochemotherapy (PUVA)    Ionizing radiation, eg radiation therapy, can also cause lentigines. Several familial syndromes associated with widespread lentigines originate from mutations in Yassine-MAP kinase, mTOR signaling and PTEN pathways. What is the treatment for lentigines? Most lentigines are left alone. Attempts to lighten them may not be successful.  The following approaches are used:  SPF 50+ broad-spectrum sunscreen   Hydroquinone bleaching cream   Alpha hydroxy acids   Vitamin C   Retinoids   Azelaic acid   Chemical peels  Individual lesions can be permanently removed using:  Cryotherapy Intense pulsed light   Pigment lasers    How can lentigines be prevented? Lentigines associated with exposure ultraviolet radiation can be prevented by very careful sun protection. Clothing is more successful at preventing new lentigines than are sunscreens. What is the outlook for lentigines? Lentigines usually persist. They may increase in number with age and sun exposure. Some in sun-protected sites may fade and disappear. CHERRY ANGIOMAS    Physical Exam:  Anatomic Location Affected:  trunk  Morphological Description:  Scattered cherry red, 1-4 mm papules. Pertinent Positives:  Pertinent Negatives: Additional History of Present Condition:      Assessment and Plan:  Based on a thorough discussion of this condition and the management approach to it (including a comprehensive discussion of the known risks, side effects and potential benefits of treatment), the patient (family) agrees to implement the following specific plan:  Monitor for changes  Benign, reassured      Assessment and Plan:    Cherry angioma, also known as Annamary Helling spots, are benign vascular skin lesions. A "cherry angioma" is a firm red, blue or purple papule, 0.1-1 cm in diameter. When thrombosed, they can appear black in colour until evaluated with a dermatoscope when the red or purple colour is more easily seen. Cherry angioma may develop on any part of the body but most often appear on the scalp, face, lips and trunk. An angioma is due to proliferating endothelial cells; these are the cells that line the inside of a blood vessel. Angiomas can arise in early life or later in life; the most common type of angioma is a cherry angioma. Cherry angiomas are very common in males and females of any age or race. They are more noticeable in white skin than in skin of colour. They markedly increase in number from about the age of 36. There may be a family history of similar lesions.  Eruptive cherry angiomas have been rarely reported to be associated with internal malignancy. The cause of angiomas is unknown. Genetic analysis of cherry angiomas has shown that they frequently carry specific somatic missense mutations in the GNAQ and GNA11 (Q209H) genes, which are involved in other vascular and melanocytic proliferations. SEBORRHEIC KERATOSIS; NON-INFLAMED    Physical Exam:  Anatomic Location Affected:  trunk  Morphological Description:  Flat and raised, waxy, smooth to warty textured, yellow to brownish-grey to dark brown to blackish, discrete, "stuck-on" appearing papules. Pertinent Positives:  Pertinent Negatives: Additional History of Present Condition:      Assessment and Plan:  Based on a thorough discussion of this condition and the management approach to it (including a comprehensive discussion of the known risks, side effects and potential benefits of treatment), the patient (family) agrees to implement the following specific plan:  Monitor for changes  Benign, reassured      Seborrheic Keratosis  A seborrheic keratosis is a harmless warty spot that appears during adult life as a common sign of skin aging. Seborrheic keratoses can arise on any area of skin, covered or uncovered, with the usual exception of the palms and soles. They do not arise from mucous membranes. Seborrheic keratoses can have highly variable appearance. Seborrheic keratoses are extremely common. It has been estimated that over 90% of adults over the age of 61 years have one or more of them. They occur in males and females of all races, typically beginning to erupt in the 35s or 45s. They are uncommon under the age of 21 years. The precise cause of seborrhoeic keratoses is not known. Seborrhoeic keratoses are considered degenerative in nature. As time goes by, seborrheic keratoses tend to become more numerous. Some people inherit a tendency to develop a very large number of them; some people may have hundreds of them.       There is no easy way to remove multiple lesions on a single occasion. Unless a specific lesion is "inflamed" and is causing pain or stinging/burning or is bleeding, most insurance companies do not authorize treatment. XEROSIS ("DRY SKIN")    Physical Exam:  Anatomic Location Affected:  diffuse  Morphological Description:  xerosis  Pertinent Positives:  Pertinent Negatives: Additional History of Present Condition:      Assessment and Plan:  Based on a thorough discussion of this condition and the management approach to it (including a comprehensive discussion of the known risks, side effects and potential benefits of treatment), the patient (family) agrees to implement the following specific plan:  Use moisturizer like Eucerin,Cerave or Aveeno Cream 3 times a day for the dry skin            Dry skin refers to skin that feels dry to touch. Dry skin has a dull surface with a rough, scaly quality. The skin is less pliable and cracked. When dryness is severe, the skin may become inflamed and fissured. Although any body site can be dry, dry skin tends to affect the shins more than any other site. Dry skin is lacking moisture in the outer horny cell layer (stratum corneum) and this results in cracks in the skin surface. Dry skin is also called xerosis, xeroderma or asteatosis (lack of fat). It can affect males and females of all ages. There is some racial variability in water and lipid content of the skin. Dry skin that starts in early childhood may be one of about 20 types of ichthyosis (fish-scale skin). There is often a family history of dry skin. Dry skin is commonly seen in people with atopic dermatitis. Nearly everyone > 60 years has dry skin. Dry skin that begins later may be seen in people with certain diseases and conditions.   Postmenopausal women  Hypothyroidism  Chronic renal disease   Malnutrition and weight loss   Subclinical dermatitis   Treatment with certain drugs such as oral retinoids, diuretics and epidermal growth factor receptor inhibitors      What is the treatment for dry skin? The mainstay of treatment of dry skin and ichthyosis is moisturisers/emollients. They should be applied liberally and often enough to:  Reduce itch   Improve the barrier function   Prevent entry of irritants, bacteria   Reduce transepidermal water loss. How can dry skin be prevented? Eliminate aggravating factors:  Reduce the frequency of bathing. A humidifier in winter and air conditioner in summer   Compare having a short shower with a prolonged soak in a bath. Use lukewarm, not hot, water. Replace standard soap with a substitute such as a synthetic detergent cleanser, water-miscible emollient, bath oil, anti-pruritic tar oil, colloidal oatmeal etc.   Apply an emollient liberally and often, particularly shortly after bathing, and when itchy. The drier the skin, the thicker this should be, especially on the hands. What is the outlook for dry skin? A tendency to dry skin may persist life-long, or it may improve once contributing factors are controlled. ACROCHORDON ("SKIN TAG")    Assessment and Plan:  Based on a thorough discussion of this condition and the management approach to it (including a comprehensive discussion of the known risks, side effects and potential benefits of treatment), the patient (family) agrees to implement the following specific plan:  Benign, assurance provided     Skin tags are common, soft, harmless skin lesions that are also called, in the appropriate settings, papillomas, fibroepithelial polyps, and soft fibromas. They are made up of loosely arranged collagen fibers and blood vessels surrounded by a thickened or thinned-out epidermis. Skin tags tend to develop in both men and women as we grow older. They are usually found on the skin folds (neck, armpits, groin). It is not known what specifically causes skin tags.   Certain factors, though, do appear to play a role:  Chaffing and irritation from skin rubbing together  High levels of growth factors (as seen, for example, in pregnancy or in acromegaly/gigantism)  Insulin resistance  Human papillomavirus (wart virus)    We discussed that most skin tags do not need to be treated unless they are specifically causing the patient physical distress or limitation or pose a risk for a larger problem such as an infection that forms secondary to excoriation or chronic irritation.     We had a thorough discussion of treatment options and specific risks (including that any procedural treatment may not be covered by insurance and would then be the patient's responsibility) and benefits/alternatives including but not limited to the following:  Cryotherapy (freezing)  Shave removal  Surgical excision (snip excision with scissors)  Electrosurgery  Ligation (we do not do this procedure and counseled against it due to risk of tissue necrosis and infection)

## 2024-03-07 ENCOUNTER — HOSPITAL ENCOUNTER (OUTPATIENT)
Dept: RADIOLOGY | Age: 69
Discharge: HOME | End: 2024-03-07
Payer: COMMERCIAL

## 2024-03-07 DIAGNOSIS — M85.80 OSTEOPENIA, UNSPECIFIED LOCATION: ICD-10-CM

## 2024-03-07 PROCEDURE — 77080 DXA BONE DENSITY AXIAL: CPT

## 2024-06-18 ENCOUNTER — HOSPITAL ENCOUNTER (OUTPATIENT)
Dept: RADIOLOGY | Age: 69
Discharge: HOME | End: 2024-06-18
Payer: COMMERCIAL

## 2024-06-18 DIAGNOSIS — Z12.31 ENCOUNTER FOR SCREENING MAMMOGRAM FOR MALIGNANT NEOPLASM OF BREAST: ICD-10-CM

## 2024-06-18 PROCEDURE — 77063 BREAST TOMOSYNTHESIS BI: CPT

## 2024-06-18 PROCEDURE — 77067 SCR MAMMO BI INCL CAD: CPT

## 2024-07-16 ENCOUNTER — APPOINTMENT (OUTPATIENT)
Dept: RADIOLOGY | Facility: MEDICAL CENTER | Age: 69
End: 2024-07-16
Payer: COMMERCIAL

## 2024-07-16 ENCOUNTER — OFFICE VISIT (OUTPATIENT)
Dept: OBGYN CLINIC | Facility: MEDICAL CENTER | Age: 69
End: 2024-07-16
Payer: COMMERCIAL

## 2024-07-16 VITALS
WEIGHT: 198.6 LBS | HEART RATE: 69 BPM | SYSTOLIC BLOOD PRESSURE: 113 MMHG | DIASTOLIC BLOOD PRESSURE: 70 MMHG | BODY MASS INDEX: 36.32 KG/M2

## 2024-07-16 DIAGNOSIS — M25.552 LEFT HIP PAIN: ICD-10-CM

## 2024-07-16 DIAGNOSIS — M54.16 LUMBAR RADICULOPATHY: ICD-10-CM

## 2024-07-16 DIAGNOSIS — M16.12 PRIMARY OSTEOARTHRITIS OF LEFT HIP: ICD-10-CM

## 2024-07-16 DIAGNOSIS — M70.62 TROCHANTERIC BURSITIS OF LEFT HIP: Primary | ICD-10-CM

## 2024-07-16 PROCEDURE — 99204 OFFICE O/P NEW MOD 45 MIN: CPT | Performed by: STUDENT IN AN ORGANIZED HEALTH CARE EDUCATION/TRAINING PROGRAM

## 2024-07-16 PROCEDURE — 73502 X-RAY EXAM HIP UNI 2-3 VIEWS: CPT

## 2024-07-16 NOTE — PROGRESS NOTES
Hip New Office Note    Assessment:     1. Left hip pain    2. Lumbar radiculopathy    3. Primary osteoarthritis of left hip    4. Trochanteric bursitis of left hip        Plan:  Tigist is a 69 yo F with referred pain to her lateral leg likely from lumbar radiculopathy and trochanteric bursitis. Her xrays were reviewed today showing mild degenerative changes. Currently, she is not having pain. At this point would recommend observation and to restart PT when the pain comes back. She can call when her pain returns and I will place a referral to PT.       Subjective:     Patient ID: Tigist Dave is a 68 y.o. female.  Chief Complaint: Left hip pain  HPI:  68 y.o. female who presents with left hip pain for 2 years. She denies any trauma or falls. She was being managed by her PCP with referral to PT. They were working on ITB exercises and the pain went away. 6 months ago the pain returned and she did another round of PT however the pain did not resolve. Her therapist though it may be her hip and suggested she see an orthopedist. Pain is burning in character, Located lateral leg, chronic in onset, intermittent in duration, mild in intensity, no specific exacerbating or Remitting factors. Pain radiates to lateral knee, occasional numbness and tingling.        Allergy:  Allergies   Allergen Reactions    Biaxin [Clarithromycin] Rash     Medications:  all current active meds have been reviewed  Past Medical History:  Past Medical History:   Diagnosis Date    Asthma     Colon polyp     Crohn's disease (HCC)      Past Surgical History:  Past Surgical History:   Procedure Laterality Date    COLONOSCOPY      ESOPHAGEAL MANOMETRY      GASTRIC BYPASS LAPAROSCOPIC      HYSTERECTOMY Bilateral     35 years old    OOPHORECTOMY Bilateral     35 years old    TONSILLECTOMY       Family History:  Family History   Problem Relation Age of Onset    Coronary artery disease Mother     Diabetes Father     Pancreatic cancer Sister 52    No Known  Problems Maternal Grandmother     No Known Problems Maternal Grandfather     No Known Problems Paternal Grandmother     No Known Problems Paternal Grandfather     No Known Problems Sister     No Known Problems Maternal Aunt     No Known Problems Maternal Aunt     No Known Problems Maternal Aunt     No Known Problems Maternal Aunt     No Known Problems Maternal Aunt     No Known Problems Maternal Aunt     No Known Problems Paternal Aunt     No Known Problems Paternal Aunt     Breast cancer Cousin     Breast cancer Cousin 50    Breast cancer Cousin 68    Colon cancer Neg Hx      Social History:  Social History     Substance and Sexual Activity   Alcohol Use Not Currently     Social History     Substance and Sexual Activity   Drug Use Never     Social History     Tobacco Use   Smoking Status Never   Smokeless Tobacco Never           ROS:  General: Per HPI  Skin: Negative, except if noted below  HEENT: Negative  Respiratory: Negative  Cardiovascular: Negative  Gastrointestinal: Negative  Urinary: Negative  Vascular: Negative  Musculoskeletal: Positive per HPI   Neurologic: Positive per HPI  Endocrine: Negative    Objective:  BP Readings from Last 1 Encounters:   07/16/24 113/70      Wt Readings from Last 1 Encounters:   07/16/24 90.1 kg (198 lb 9.6 oz)        Respiratory:   non-labored respirations    Lymphatics:  no palpable lymph nodes    Gait and Station:   normal    Neurologic:   Alert and oriented times 3  Patient with normal sensation except as noted below  Deep tendon reflexes 2+ except as noted in MSK exam    Bilateral Lower Extremity:  Left Hip     Inspection: Intact, no previous incisions    Range of Motion: full Without pain    - log roll    - Trendelenburg sign    Motor: 5/5 Q/HS/TA/GS/P    Pulses: 2+ DP / 2+ PT    SILT DP/SP/S/S/TN    Lumbar spine  Skin intact, no scars  TTP right paraspinals and SIJ  Decreased ROM without pain  -SLR  SILT L3-S1    Imaging:  My interpretation XR AP pelvis/left hip: mild  "joint space narrowing. No fracture or dislocation.     BMI:   Estimated body mass index is 36.32 kg/m² as calculated from the following:    Height as of 11/28/23: 5' 2\" (1.575 m).    Weight as of this encounter: 90.1 kg (198 lb 9.6 oz).  BSA:   Estimated body surface area is 1.91 meters squared as calculated from the following:    Height as of 11/28/23: 5' 2\" (1.575 m).    Weight as of this encounter: 90.1 kg (198 lb 9.6 oz).           Scribe Attestation      I,:   am acting as a scribe while in the presence of the attending physician.:       I,:   personally performed the services described in this documentation    as scribed in my presence.:            "

## 2024-10-22 ENCOUNTER — NURSE TRIAGE (OUTPATIENT)
Age: 69
End: 2024-10-22

## 2024-10-22 NOTE — TELEPHONE ENCOUNTER
"LOV 5/18/23 Dr. Brand, Procedures esophageal manometry/EGD  1/11/22, colonoscopy 9/9/20, no recent labs or GI imaging.    Patient is asking to be scheduled for urgent visit for recent increase in Crohn's symptoms (noted on 2/25/20 C/R note)  as with previous flares in past. She is noting abdominal pain severe at times, affecting sleep. She states the pain is around her waist, mid stomach. She notes today is tolerable. She has no changes in bowel habits, no issue with reflux at this time. She has had three episodes in the past few weeks. Patient is not on any IBD medications. She notes chills at times. She is hydrating, unsure if issue possibly related to foods since she did have ice cream prior to one of the episodes of severe pain. Patient does not want to wait until March 2025 for visit.    I did review that she should report to ED with severe abdominal pain. Testing can be done to include labs/imaging if needed. She will comply if severe pain occurs again. She is willing to see any provider at [place], either office if approved to be placed on urgent visit.  Please review/advise.    Answer Assessment - Initial Assessment Questions  1. ABDOMINAL PAIN: \"Are you having any abdominal pain?\" If yes, ask: \"What does it feel like?\" (e.g., crampy, dull, intermittent, constant)   Abdominal pain  2. ABDOMINAL PAIN SEVERITY: If present, ask: \"How bad is the pain?\"  (e.g., Scale 1-10; mild, moderate, or severe)      - MILD (1-3): doesn't interfere with normal activities, abdomen soft and not tender to touch     - MODERATE (4-7): interferes with normal activities or awakens from sleep, tender to touch     - SEVERE (8-10): excruciating pain, doubled over, unable to do any normal activities   Severe pain around waist, mid stomach three occurances recently today tolerable  3. RECURRENT SYMPTOM: \"Have you ever had this type of stomach pain before?\" If yes, ask: \"When was the last time?\" and \"What happened that time?\"   Yes, " "similar to previous Crohn's flares  4. AGGRAVATING FACTORS: \"Does anything seem to cause this pain to worsen?\" (e.g., foods, stress, alcohol)   Possibly related to foods but could be ice cream since ate some and had symptoms aftewards one time  5. DIARRHEA SEVERITY: \"How bad is the diarrhea?\" \"How many extra stools have you had in the past 24 hours than normal?\"     - NO DIARRHEA (SCALE 0)     - MILD (SCALE 1-3): Few loose or mushy BMs (Bowel Movement); increase of 1-3 stools over normal daily number of stools; mild increase in ostomy output.     - MODERATE (SCALE 4-7): Increase of 4-6 stools daily over normal; moderate increase in ostomy output.    - SEVERE (SCALE 8-10; OR 'WORST POSSIBLE'): Increase of seven or more stools daily over normal; moderate increase in ostomy output; incontinence.   No change in stool pattern, usually loose stool, no blood  6. ONSET: \"When did the diarrhea begin? Are you having urgency?\"   N/A  7. BM (Bowel Movement) CONSISTENCY: \"How loose or watery is the diarrhea?\" Do you see any blood or mucus in your stool and if so, how much are you seeing?   Hx of loose stool, no change    8. VOMITING: \"Are you also vomiting?\" If yes, ask: \"How many times in the past 24 hours?\"   denies  9. ORAL INTAKE: If vomiting, \"Have you been able to drink liquids?\" \"How much fluids have you had in the past 24 hours?\"   Hydrating   10. HYDRATION: \"Any signs of dehydration?\" (e.g., dry mouth [not just dry lips], too weak to stand, dizziness, new weight loss) \"When did you last urinate?\"   denies  11. What IBD (biologic) medications are you taking and are you up to date with the past three doses?   Not currently  12. Have you taken a steroid taper? If so, when was your last?   denies  13. Have you had any recent travel, sick contacts, or recent hospitalizations?   denies  14. Do you have any fever, chills, sweat, joint pain, rashes?   Chills lately  15. Do you have any other concerns?   Requesting " appointment    Protocols used: GI-IBD (Irrirtable Bowel Disease)-ADULT-[place]

## 2024-10-22 NOTE — TELEPHONE ENCOUNTER
Regarding: crohns symtoms  ----- Message from Bettie YOUNG sent at 10/22/2024  1:48 PM EDT -----  Pt calling to say her crohns symptoms are worse then ever and she would like to see Dr. Cathie DUONG. I looked with everyone at the office and was unable to find an appt until the end of March. The pt would not let me schedule and add to the waitlist prior to speaking with a nurse

## 2024-10-23 DIAGNOSIS — K52.9 COLITIS: Primary | ICD-10-CM

## 2024-10-23 NOTE — TELEPHONE ENCOUNTER
Could you please address patient symptoms and request for earlier/urgent visit since routine follow up offered to patient is March 2025. I did forward as noted to Dr. Brand since he saw patient last and you can view response.

## 2024-10-25 ENCOUNTER — APPOINTMENT (OUTPATIENT)
Dept: LAB | Facility: CLINIC | Age: 69
End: 2024-10-25
Payer: COMMERCIAL

## 2024-10-25 DIAGNOSIS — K52.9 COLITIS: ICD-10-CM

## 2024-10-25 PROCEDURE — 83993 ASSAY FOR CALPROTECTIN FECAL: CPT

## 2024-10-28 LAB — CALPROTECTIN STL-MCNC: 145 ΜG/G

## 2024-10-29 ENCOUNTER — OFFICE VISIT (OUTPATIENT)
Dept: GASTROENTEROLOGY | Facility: CLINIC | Age: 69
End: 2024-10-29
Payer: COMMERCIAL

## 2024-10-29 ENCOUNTER — TELEPHONE (OUTPATIENT)
Dept: GASTROENTEROLOGY | Facility: CLINIC | Age: 69
End: 2024-10-29

## 2024-10-29 VITALS
BODY MASS INDEX: 36.8 KG/M2 | SYSTOLIC BLOOD PRESSURE: 113 MMHG | HEART RATE: 80 BPM | WEIGHT: 200 LBS | HEIGHT: 62 IN | DIASTOLIC BLOOD PRESSURE: 76 MMHG

## 2024-10-29 DIAGNOSIS — K50.919 CROHN'S DISEASE WITH COMPLICATION, UNSPECIFIED GASTROINTESTINAL TRACT LOCATION (HCC): ICD-10-CM

## 2024-10-29 DIAGNOSIS — K44.9 HIATAL HERNIA: ICD-10-CM

## 2024-10-29 DIAGNOSIS — R10.84 GENERALIZED ABDOMINAL PAIN: Primary | ICD-10-CM

## 2024-10-29 DIAGNOSIS — Z86.0101 HX OF ADENOMATOUS COLONIC POLYPS: ICD-10-CM

## 2024-10-29 DIAGNOSIS — K22.4 ESOPHAGEAL DYSMOTILITY: ICD-10-CM

## 2024-10-29 PROCEDURE — 99214 OFFICE O/P EST MOD 30 MIN: CPT | Performed by: NURSE PRACTITIONER

## 2024-10-29 PROCEDURE — G2211 COMPLEX E/M VISIT ADD ON: HCPCS | Performed by: NURSE PRACTITIONER

## 2024-10-29 NOTE — PROGRESS NOTES
Ambulatory Visit  Name: Tigist Dave      : 1955      MRN: [id number]  Encounter Provider: FAWN Nichols  Encounter Date: 10/29/2024   Encounter department: [facility] GASTROENTEROLOGY [address]    Assessment & Plan  Generalized abdominal pain  Patient reports she has been having intermittent episodes of abdominal pain which becomes severe at times and causes her to double over.  Patient does report tenderness in abdomen from mid abdomen down.  Abdominal pain may be secondary to upper or lower GI etiology, Crohn's disease, irritable bowel syndrome, lesion, etc.  Orders:    polyethylene glycol (GOLYTELY) 4000 mL solution; Take 4,000 mL by mouth once for 1 dose Take as directed by office prior to procedure    Colonoscopy; Future    EGD; Future  -If EGD and colonoscopy are unrevealing of cause of symptoms will obtain CT of abdomen and pelvis for further evaluation.  Hiatal hernia  Patient has history of hiatal hernia.  Patient reports GERD symptoms are currently well-controlled with omeprazole.  -Continue omeprazole 20 mg daily  -Continue antireflux diet and measures       Esophageal dysmotility  Patient has history of esophageal dysmotility.  Esophageal manometry done  was normal.  Upper GI done in  did suggest tertiary contractions of the esophagus.  -Eat slow, chew well, alternate solids and liquids.  Eat frequent small meals throughout the day.       Crohn's disease with complication, unspecified gastrointestinal tract location (HCC)  Patient reports episode of diarrhea approximately 2 weeks ago which resolved on its own.  Fecal calprotectin 145 done 10/25/2024.  Last several colonoscopies dating back to  have been negative for any signs of inflammatory bowel disease.  Patient was previously on Asacol which was discontinued in  .  Patient has not been on any Crohn's medication since that time.  If colonoscopy is negative for Crohn's disease episode of  diarrhea may be secondary to irritable bowel syndrome.  Orders:    Colonoscopy; Future    Hx of adenomatous colonic polyps  Colonoscopy done 9/9/2020 showed 1 benign-appearing polyp in ascending colon.  Otherwise normalo mucosa up to the cecum..  Normal vascular markings.  No signs of inflammatory bowel disease.  Normal terminal ileum.  Biopsy showed tubular adenoma.       Follow up after procedure    History of Present Illness     Tigist Dave is a 68 y.o. female who presents to office for follow up.Patient reports episode of diarrhea approximately 2 weeks ago which resolved on its own.  Fecal calprotectin 145 done 10/25/2024.  Last several colonoscopies dating back to 1998 have been negative for any signs of inflammatory bowel disease.  Patient was previously on Asacol which was discontinued in 2005 .  Patient has not been on any Crohn's medication since that time.  If colonoscopy is negative for Crohn's disease episode of diarrhea may be secondary to irritable bowel syndrome. Patient reports she has been having intermittent episodes of abdominal pain which becomes severe at times and causes her to double over.  Patient does report tenderness in abdomen from mid abdomen down.She denies nausea, vomiting, acid reflux, heartburn, upper abdomen pain.  Does have history of esophageal dysphagia which she says is controlled by eating in small meals and chewing well.  Patient is also on omeprazole.  Patient reports GERD symptoms are well-controlled on omeprazole.      EGD done 3/25/2020 showed torturous esophagus.  Food debris in the distal esophagus.  Normal-appearing GE junction.  Gastric pouch, jejunum and rouxl limb appear normal.  Biopsy showed no significant pathological alterations.  No increase in intraepithelial eosinophils is seen.  Colonoscopy done 9/9/2020 showed 1 benign-appearing polyp in ascending colon.  Otherwise normalo mucosa up to the cecum..  Normal vascular markings.  No signs of inflammatory bowel  disease.  Normal terminal ileum.  Biopsy showed tubular adenoma.          Review of Systems   Constitutional:  Negative for chills and fever.   HENT:  Negative for ear pain and sore throat.    Eyes:  Negative for pain and visual disturbance.   Respiratory:  Negative for cough and shortness of breath.    Cardiovascular:  Negative for chest pain and palpitations.   Gastrointestinal:  Positive for abdominal pain. Negative for vomiting.   Genitourinary:  Negative for dysuria and hematuria.   Musculoskeletal:  Negative for arthralgias and back pain.   Skin:  Negative for color change and rash.   Neurological:  Negative for seizures and syncope.   All other systems reviewed and are negative.    Medical History Reviewed by provider this encounter:  Tobacco  Allergies  Meds  Problems  Med Hx  Surg Hx  Fam Hx       Past Medical History   Past Medical History:   Diagnosis Date    Asthma     Colon polyp     Crohn's disease (HCC)      Past Surgical History:   Procedure Laterality Date    COLONOSCOPY      ESOPHAGEAL MANOMETRY      GASTRIC BYPASS LAPAROSCOPIC      HYSTERECTOMY Bilateral     35 years old    OOPHORECTOMY Bilateral     35 years old    TONSILLECTOMY       Family History   Problem Relation Age of Onset    Coronary artery disease Mother     Diabetes Father     Pancreatic cancer Sister 52    No Known Problems Maternal Grandmother     No Known Problems Maternal Grandfather     No Known Problems Paternal Grandmother     No Known Problems Paternal Grandfather     No Known Problems Sister     No Known Problems Maternal Aunt     No Known Problems Maternal Aunt     No Known Problems Maternal Aunt     No Known Problems Maternal Aunt     No Known Problems Maternal Aunt     No Known Problems Maternal Aunt     No Known Problems Paternal Aunt     No Known Problems Paternal Aunt     Breast cancer Cousin     Breast cancer Cousin 50    Breast cancer Cousin 68    Colon cancer Neg Hx      Current Outpatient Medications on File  "Prior to Visit   Medication Sig Dispense Refill    Omega-3 Fatty Acids (FISH OIL PO) Take by mouth      rosuvastatin (CRESTOR) 10 MG tablet       multivitamin (THERAGRAN) TABS Take 1 tablet by mouth daily (Patient not taking: Reported on 5/18/2023)      omeprazole (PriLOSEC) 20 mg delayed release capsule Take 1 capsule (20 mg total) by mouth daily 30 capsule 0     No current facility-administered medications on file prior to visit.     Allergies   Allergen Reactions    Biaxin [Clarithromycin] Rash      Current Outpatient Medications on File Prior to Visit   Medication Sig Dispense Refill    Omega-3 Fatty Acids (FISH OIL PO) Take by mouth      rosuvastatin (CRESTOR) 10 MG tablet       multivitamin (THERAGRAN) TABS Take 1 tablet by mouth daily (Patient not taking: Reported on 5/18/2023)      omeprazole (PriLOSEC) 20 mg delayed release capsule Take 1 capsule (20 mg total) by mouth daily 30 capsule 0     No current facility-administered medications on file prior to visit.      Social History     Tobacco Use    Smoking status: Never    Smokeless tobacco: Never   Vaping Use    Vaping status: Never Used   Substance and Sexual Activity    Alcohol use: Not Currently    Drug use: Never    Sexual activity: Yes     Partners: Male     Birth control/protection: Post-menopausal         Objective     /76 (BP Location: Left arm, Patient Position: Sitting, Cuff Size: Standard)   Pulse 80   Ht 5' 2\" (1.575 m)   Wt 90.7 kg (200 lb)   LMP  (LMP Unknown)   BMI 36.58 kg/m²     Physical Exam  Vitals and nursing note reviewed.   Constitutional:       General: She is not in acute distress.     Appearance: She is well-developed.   HENT:      Head: Normocephalic and atraumatic.   Eyes:      Conjunctiva/sclera: Conjunctivae normal.   Cardiovascular:      Rate and Rhythm: Normal rate and regular rhythm.      Heart sounds: No murmur heard.  Pulmonary:      Effort: Pulmonary effort is normal. No respiratory distress.      Breath sounds: " Normal breath sounds.   Abdominal:      Palpations: Abdomen is soft.      Tenderness: There is no abdominal tenderness.   Musculoskeletal:         General: No swelling.      Cervical back: Neck supple.   Skin:     General: Skin is warm and dry.      Capillary Refill: Capillary refill takes less than 2 seconds.   Neurological:      Mental Status: She is alert.   Psychiatric:         Mood and Affect: Mood normal.

## 2024-10-29 NOTE — ASSESSMENT & PLAN NOTE
Colonoscopy done 9/9/2020 showed 1 benign-appearing polyp in ascending colon.  Otherwise normalo mucosa up to the cecum..  Normal vascular markings.  No signs of inflammatory bowel disease.  Normal terminal ileum.  Biopsy showed tubular adenoma.       Follow up after procedure

## 2024-10-29 NOTE — TELEPHONE ENCOUNTER
Scheduled date of EGD/colonoscopy (as of today):12/10/24  Physician performing EGD/colonoscopy:Dr Brand   Location of EGD/colonoscopy:[facility]   Desired bowel prep reviewed with patient:elina   Instructions reviewed with patient by:sb  Clearances:  none

## 2024-10-29 NOTE — PATIENT INSTRUCTIONS
Avoid NSAIDs  May take acetaminophen  Avoid lactose containing food  Eat slow, chew well, alternate solids and liquids.   Eat frequent small meals.  Follow antireflux diet and measures-avoid caffeine, carbonated beverages, spicy fatty foods, tomato-based products.  May have 1 cup of coffee daily  May take Tums or Gaviscon as needed

## 2024-10-29 NOTE — ASSESSMENT & PLAN NOTE
Patient reports she has been having intermittent episodes of abdominal pain which becomes severe at times and causes her to double over.  Patient does report tenderness in abdomen from mid abdomen down.  Abdominal pain may be secondary to upper or lower GI etiology, Crohn's disease, irritable bowel syndrome, lesion, etc.  Orders:    polyethylene glycol (GOLYTELY) 4000 mL solution; Take 4,000 mL by mouth once for 1 dose Take as directed by office prior to procedure    Colonoscopy; Future    EGD; Future  -If EGD and colonoscopy are unrevealing of cause of symptoms will obtain CT of abdomen and pelvis for further evaluation.

## 2024-10-29 NOTE — ASSESSMENT & PLAN NOTE
Patient reports episode of diarrhea approximately 2 weeks ago which resolved on its own.  Fecal calprotectin 145 done 10/25/2024.  Last several colonoscopies dating back to 1998 have been negative for any signs of inflammatory bowel disease.  Patient was previously on Asacol which was discontinued in 2005 .  Patient has not been on any Crohn's medication since that time.  If colonoscopy is negative for Crohn's disease episode of diarrhea may be secondary to irritable bowel syndrome.  Orders:    Colonoscopy; Future

## 2024-10-29 NOTE — ASSESSMENT & PLAN NOTE
Patient has history of esophageal dysmotility.  Esophageal manometry done 2022 was normal.  Upper GI done in 2020 did suggest tertiary contractions of the esophagus.  -Eat slow, chew well, alternate solids and liquids.  Eat frequent small meals throughout the day.

## 2024-10-29 NOTE — ASSESSMENT & PLAN NOTE
Patient has history of hiatal hernia.  Patient reports GERD symptoms are currently well-controlled with omeprazole.  -Continue omeprazole 20 mg daily  -Continue antireflux diet and measures

## 2024-10-31 ENCOUNTER — TELEPHONE (OUTPATIENT)
Age: 69
End: 2024-10-31

## 2024-10-31 NOTE — TELEPHONE ENCOUNTER
Returned call and scheduled as NP for OD ANNUAL 60 minutes with PA/CRNP for any possible testing and then will be referred to surgeon for follow-up.

## 2024-10-31 NOTE — TELEPHONE ENCOUNTER
Patient is a past bypass patient of Dr Wayne', she has a complicated history of gastro/crohns issues and is having some problems that her Gastro doctor doesn't seem to have answers for.  She would like to schedule with Dr Cameron Jiménez, I was unable to schedule her.  Please call her at [phone]. Thanks.

## 2024-11-07 ENCOUNTER — OFFICE VISIT (OUTPATIENT)
Dept: BARIATRICS | Facility: CLINIC | Age: 69
End: 2024-11-07
Payer: COMMERCIAL

## 2024-11-07 VITALS
SYSTOLIC BLOOD PRESSURE: 118 MMHG | WEIGHT: 198.5 LBS | DIASTOLIC BLOOD PRESSURE: 68 MMHG | OXYGEN SATURATION: 98 % | BODY MASS INDEX: 36.53 KG/M2 | HEART RATE: 92 BPM | TEMPERATURE: 97.9 F | HEIGHT: 62 IN

## 2024-11-07 DIAGNOSIS — R13.11 ORAL PHASE DYSPHAGIA: ICD-10-CM

## 2024-11-07 DIAGNOSIS — R10.9 ABDOMINAL PAIN: ICD-10-CM

## 2024-11-07 DIAGNOSIS — E56.9 INADEQUATE VITAMIN INTAKE: ICD-10-CM

## 2024-11-07 DIAGNOSIS — E66.812 OBESITY, CLASS II, BMI 35-39.9: ICD-10-CM

## 2024-11-07 DIAGNOSIS — Z98.84 BARIATRIC SURGERY STATUS: ICD-10-CM

## 2024-11-07 DIAGNOSIS — K91.2 POSTSURGICAL MALABSORPTION: ICD-10-CM

## 2024-11-07 DIAGNOSIS — Z48.815 ENCOUNTER FOR SURGICAL AFTERCARE FOLLOWING SURGERY OF DIGESTIVE SYSTEM: Primary | ICD-10-CM

## 2024-11-07 PROCEDURE — 99204 OFFICE O/P NEW MOD 45 MIN: CPT | Performed by: NURSE PRACTITIONER

## 2024-11-07 RX ORDER — DICYCLOMINE HYDROCHLORIDE 10 MG/1
10 CAPSULE ORAL
Qty: 120 CAPSULE | Refills: 0 | Status: SHIPPED | OUTPATIENT
Start: 2024-11-07

## 2024-11-07 NOTE — PROGRESS NOTES
"Assessment/Plan:     Patient ID: Tigist Dave is a 68 y.o. female.     Bariatric Surgery Status/BMI 36/abdominal pain    -s/p Doug-En-Y Gastric Bypass with [facility] in 2006 with longstanding hx of crohn's disease (even prior to her sx). Presents to the office today with her daughter and  for an OD annual visit with concerns of abdominal pain.     Has mid to lower abdominal pain started about 1 month ago. Initially occurred intermittently 1-2 a week and now has increase to 2-3 times per week. This pain is very similar in the past even prior to her surgery with Crohn's exacerbation.describes as sharp stabbing pain \"takes breath away and doubled over.\" May or may not be triggered with food intake. Has associated diarrhea especially in the morning. She has noticed since her surgery about years later, started to develop food intolerence, developed lactose intolerance, and having difficulty swallowing.     - Denies nsaids, smoking, ETOH, Drinks 8-12 oz of cup.     Patient and family members concern for worsening crohn's. Has seen GI and is planning to have EGD and colonoscopy. Concern for crohn's in the gastric remnant. Currently not on any medications for Crohn's. Had calprotectin mildly elevated - 145 on 10/25/2024.    PLAN:     - at length discussion with patient and family members. May be related to crohn's exacerbation; will rule out bariatric etiology. Recommended to continue with F/U with GI and refer to IBD specialist. If positive for active crohn's, recommend treatment. If treatment is not effective, may need diagnostic lap to rule out transient internal hernia.   - Routine follow up in 1 year for annual visit.   - Continue with healthy lifestyle, adequate protein intake of 60 gm, fluid intake of at least 64 oz.   - Continue with MVI daily.   - Activity as tolerated.   - Labs ordered and will adjust accordingly if any deficiency.   - Follow up with RD and SW as needed.         Continued/Maintain " healthy weight loss with good nutrition intakes.  Adequate hydration with at least 64oz. fluid intake.  Follow diet as discussed.  Follow vitamin and mineral recommendations as reviewed with you.  Exercise as tolerated.    Colonoscopy referral made: will obtain through GI  Mammogram - UTD    Follow-up in 1 year for annual. Will follow up sooner based on CT scan findings. We kindly ask that your arrive 15 minutes before your scheduled appointment time with your provider to allow our staff to room you, get your vital signs and update your chart.    Get lab work done prior to annual visit. Please call the office if you need a script.  It is recommended to check with your insurance BEFORE getting labs done to make sure they are covered by your policy.      Call our office if you have any problems with abdominal pain especially associated with fever, chills, nausea, vomiting or any other concerns.    All  Post-bariatric surgery patients should be aware that very small quantities of any alcohol can cause impairment and it is very possible not to feel the effect. The effect can be in the system for several hours.  It is also a stomach irritant.     It is advised to AVOID alcohol, Nonsteroidal antiinflammatory drugs (NSAIDS) and nicotine of all forms . Any of these can cause stomach irritation/pain.    Discussed the effects of alcohol on a bariatric patient and the increased impairment risk.     Keep up the good work!     Postsurgical Malabsorption   -At risk for malabsorption of vitamins/minerals secondary to malabsorption and restriction of intake from bariatric surgery  -NOT Currently taking adequate postop bariatric surgery vitamin supplementation - list of bariatric multivitamins provided.   -Next set of bariatric labs ordered for approximately 3 months  -Patient received education about the importance of adhering to a lifelong supplementation regimen to avoid vitamin/mineral deficiencies      Diagnoses and all orders  for this visit:    Encounter for surgical aftercare following surgery of digestive system  -     Folate; Future  -     PTH, intact; Future  -     Vitamin A; Future  -     Vitamin B1, whole blood; Future  -     Vitamin B12; Future  -     Vitamin D 25 hydroxy; Future  -     Zinc; Future  -     Methylmalonic acid, serum; Future  -     Iron, TIBC and Ferritin Panel; Future  -     Folate  -     PTH, intact  -     Vitamin A  -     Vitamin B1, whole blood  -     Vitamin B12  -     Vitamin D 25 hydroxy  -     Zinc  -     Methylmalonic acid, serum  -     Iron, TIBC and Ferritin Panel    Bariatric surgery status  -     dicyclomine (BENTYL) 10 mg capsule; Take 1 capsule (10 mg total) by mouth 4 (four) times a day (before meals and at bedtime)  -     Folate; Future  -     PTH, intact; Future  -     Vitamin A; Future  -     Vitamin B1, whole blood; Future  -     Vitamin B12; Future  -     Vitamin D 25 hydroxy; Future  -     Zinc; Future  -     Methylmalonic acid, serum; Future  -     Iron, TIBC and Ferritin Panel; Future  -     Folate  -     PTH, intact  -     Vitamin A  -     Vitamin B1, whole blood  -     Vitamin B12  -     Vitamin D 25 hydroxy  -     Zinc  -     Methylmalonic acid, serum  -     Iron, TIBC and Ferritin Panel  -     CT abdomen pelvis with and without contrast; Future    Postsurgical malabsorption  -     Folate; Future  -     PTH, intact; Future  -     Vitamin A; Future  -     Vitamin B1, whole blood; Future  -     Vitamin B12; Future  -     Vitamin D 25 hydroxy; Future  -     Zinc; Future  -     Methylmalonic acid, serum; Future  -     Iron, TIBC and Ferritin Panel; Future  -     Folate  -     PTH, intact  -     Vitamin A  -     Vitamin B1, whole blood  -     Vitamin B12  -     Vitamin D 25 hydroxy  -     Zinc  -     Methylmalonic acid, serum  -     Iron, TIBC and Ferritin Panel    Obesity, Class II, BMI 35-39.9  -     Folate; Future  -     PTH, intact; Future  -     Vitamin A; Future  -     Vitamin B1, whole  blood; Future  -     Vitamin B12; Future  -     Vitamin D 25 hydroxy; Future  -     Zinc; Future  -     Methylmalonic acid, serum; Future  -     Iron, TIBC and Ferritin Panel; Future  -     Folate  -     PTH, intact  -     Vitamin A  -     Vitamin B1, whole blood  -     Vitamin B12  -     Vitamin D 25 hydroxy  -     Zinc  -     Methylmalonic acid, serum  -     Iron, TIBC and Ferritin Panel    BMI 36.0-36.9,adult  -     Folate; Future  -     PTH, intact; Future  -     Vitamin A; Future  -     Vitamin B1, whole blood; Future  -     Vitamin B12; Future  -     Vitamin D 25 hydroxy; Future  -     Zinc; Future  -     Methylmalonic acid, serum; Future  -     Iron, TIBC and Ferritin Panel; Future  -     Folate  -     PTH, intact  -     Vitamin A  -     Vitamin B1, whole blood  -     Vitamin B12  -     Vitamin D 25 hydroxy  -     Zinc  -     Methylmalonic acid, serum  -     Iron, TIBC and Ferritin Panel  -     CT abdomen pelvis with and without contrast; Future    Abdominal pain  -     dicyclomine (BENTYL) 10 mg capsule; Take 1 capsule (10 mg total) by mouth 4 (four) times a day (before meals and at bedtime)  -     CT abdomen pelvis with and without contrast; Future    Oral phase dysphagia  -     dicyclomine (BENTYL) 10 mg capsule; Take 1 capsule (10 mg total) by mouth 4 (four) times a day (before meals and at bedtime)    Inadequate vitamin intake  -     Folate; Future  -     PTH, intact; Future  -     Vitamin A; Future  -     Vitamin B1, whole blood; Future  -     Vitamin B12; Future  -     Vitamin D 25 hydroxy; Future  -     Zinc; Future  -     Methylmalonic acid, serum; Future  -     Iron, TIBC and Ferritin Panel; Future  -     Folate  -     PTH, intact  -     Vitamin A  -     Vitamin B1, whole blood  -     Vitamin B12  -     Vitamin D 25 hydroxy  -     Zinc  -     Methylmalonic acid, serum  -     Iron, TIBC and Ferritin Panel         Subjective:      Patient ID: Tigist Dave is a 68 y.o. female.    -s/p Doug-En-Y  "Gastric Bypass with [facility] in 2006 with longstanding hx of crohn's disease (even prior to her sx). Presents to the office today with her daughter and  for an OD annual visit with concerns of abdominal pain.     Has mid to lower abdominal pain started about 1 month ago. Initially occurred intermittently 1-2 a week and now has increase to 2-3 times per week. This pain is very similar in the past even prior to her surgery with Crohn's exacerbation.describes as sharp stabbing pain \"takes breath away and doubled over.\" May or may not be triggered with food intake. Has associated diarrhea especially in the morning. She has noticed since her surgery about years later, started to develop food intolerence, developed lactose intolerance, and having difficulty swallowing.     - Denies nsaids, smoking, ETOH, Drinks 8-12 oz of cup.     Patient and family members concern for worsening crohn's. Has seen GI and is planning to have EGD and colonoscopy. Concern for crohn's in the gastric remnant. Currently not on any medications for Crohn's. Had calprotectin mildly elevated - 145 on 10/25/2024.    Initial: 230 lbs  Current: 198.5 lbs  EWL: (Weight loss is ahead of schedule at this post surgical period.)  Milo: 140 lbs  Current BMI is Body mass index is 36.31 kg/m².    Tolerating a regular diet-yes  Eating at least 60 grams of protein per day-yes  Following 30/60 minute rule with liquids-yes  Drinking at least 64 ounces of fluid per day- struggles at times due to esophageal dysmotility.   Drinking carbonated beverages-yes  Using NSAIDs regularly-no  Using nicotine-no  Using alcohol-no  Supplements:  none; tums    The following portions of the patient's history were reviewed and updated as appropriate: allergies, current medications, past family history, past medical history, past social history, past surgical history and problem list.    Review of Systems   Constitutional:  Positive for appetite change, fatigue and " "unexpected weight change.   Respiratory: Negative.     Cardiovascular: Negative.    Gastrointestinal:  Positive for abdominal pain, nausea and vomiting.        Heartburn     Musculoskeletal: Negative.    Neurological: Negative.    Psychiatric/Behavioral: Negative.           Objective:    /68 (BP Location: Left arm, Patient Position: Sitting, Cuff Size: Adult)   Pulse 92   Temp 97.9 °F (36.6 °C) (Tympanic)   Ht 5' 2\" (1.575 m)   Wt 90 kg (198 lb 8 oz)   LMP  (LMP Unknown)   SpO2 98%   BMI 36.31 kg/m²      Physical Exam  Vitals and nursing note reviewed.   Constitutional:       Appearance: Normal appearance. She is obese.   Cardiovascular:      Rate and Rhythm: Normal rate and regular rhythm.      Pulses: Normal pulses.      Heart sounds: Normal heart sounds.   Pulmonary:      Effort: Pulmonary effort is normal.      Breath sounds: Normal breath sounds.   Abdominal:      General: Bowel sounds are normal.      Palpations: Abdomen is soft.      Tenderness: There is no abdominal tenderness.   Musculoskeletal:         General: Normal range of motion.   Skin:     General: Skin is warm and dry.   Neurological:      General: No focal deficit present.      Mental Status: She is alert and oriented to person, place, and time.   Psychiatric:         Mood and Affect: Mood normal.         Behavior: Behavior normal.         Thought Content: Thought content normal.         Judgment: Judgment normal.           I have spent a total time of 50 minutes in caring for this patient on the day of the visit/encounter including Diagnostic results, Prognosis, Risks and benefits of tx options, Instructions for management, Patient and family education, Importance of tx compliance, Risk factor reductions, Impressions, Counseling / Coordination of care, Documenting in the medical record, Reviewing / ordering tests, medicine, procedures  , and Obtaining or reviewing history  .    "

## 2024-11-07 NOTE — PROGRESS NOTES
Date of surgery: 2006  Procedure: Gastric ByPass   Performing surgeon: [facility]     Initial Weight - 199.0 lb   Current Weight - 198.5 lb   Milo Weight - 196.5 lb   Total Body Weight Loss (EWL)- 0.4  EWL% - 1%  TWB % - 0%

## 2024-11-08 ENCOUNTER — TELEPHONE (OUTPATIENT)
Dept: BARIATRICS | Facility: CLINIC | Age: 69
End: 2024-11-08

## 2024-11-08 NOTE — TELEPHONE ENCOUNTER
Called Pt to let her know her  her  Insurance approved DICYCLOMINE [id number] sent to her [facility] .

## 2024-11-08 NOTE — TELEPHONE ENCOUNTER
PA for DICYCLOMINE 10MG SUBMITTED to AET    Via CoverMyMeds    [x]CMM-KEY: [id number]  []Surescripts-Case ID #   []Availity-Auth ID # NDC #   []Faxed to plan   []Other website   []Phone call Case ID #     []PA sent as URGENT    All office notes, labs and other pertaining documents and studies sent. Clinical questions answered. Awaiting determination from insurance company.     Turnaround time for your insurance to make a decision on your Prior Authorization can take 7-21 business days.

## 2024-11-09 ENCOUNTER — HOSPITAL ENCOUNTER (OUTPATIENT)
Dept: CT IMAGING | Facility: HOSPITAL | Age: 69
Discharge: HOME | End: 2024-11-09
Payer: COMMERCIAL

## 2024-11-09 DIAGNOSIS — R10.9 ABDOMINAL PAIN: ICD-10-CM

## 2024-11-09 DIAGNOSIS — Z98.84 BARIATRIC SURGERY STATUS: ICD-10-CM

## 2024-11-09 PROCEDURE — 74177 CT ABD & PELVIS W/CONTRAST: CPT

## 2024-11-09 RX ADMIN — IOHEXOL 75 ML: 350 INJECTION, SOLUTION INTRAVENOUS at 10:44

## 2024-11-13 ENCOUNTER — PREP FOR PROCEDURE (OUTPATIENT)
Dept: BARIATRICS | Facility: CLINIC | Age: 69
End: 2024-11-13

## 2024-11-13 ENCOUNTER — CONSULT (OUTPATIENT)
Dept: BARIATRICS | Facility: CLINIC | Age: 69
End: 2024-11-13
Payer: COMMERCIAL

## 2024-11-13 ENCOUNTER — TELEPHONE (OUTPATIENT)
Age: 69
End: 2024-11-13

## 2024-11-13 VITALS
OXYGEN SATURATION: 98 % | DIASTOLIC BLOOD PRESSURE: 68 MMHG | HEIGHT: 62 IN | SYSTOLIC BLOOD PRESSURE: 128 MMHG | BODY MASS INDEX: 36.71 KG/M2 | TEMPERATURE: 97.8 F | WEIGHT: 199.5 LBS | HEART RATE: 68 BPM

## 2024-11-13 DIAGNOSIS — Z98.84 BARIATRIC SURGERY STATUS: Primary | ICD-10-CM

## 2024-11-13 DIAGNOSIS — K44.9 HIATAL HERNIA: Primary | ICD-10-CM

## 2024-11-13 PROCEDURE — 99213 OFFICE O/P EST LOW 20 MIN: CPT | Performed by: SURGERY

## 2024-11-13 RX ORDER — MULTIVITAMIN
1 TABLET ORAL DAILY
COMMUNITY

## 2024-11-13 NOTE — TELEPHONE ENCOUNTER
Patient called in regarding a checklist for her PCP to fill out. Patient also states that it could possibly be a letter the PCP needs to sign. She is requesting for this be faxed over to Dr. Enriquez's office at [phone]. You can also reach the office directly by phone call to [phone] if there are any questions or concerns.    Patient stated she will be going for her pre-op testing next week.

## 2024-11-13 NOTE — PROGRESS NOTES
OFFICE VISIT - BARIATRIC SURGERY  Tigist Dave 68 y.o. female MRN: [id number]  Unit/Bed#:  Encounter: [id number]      HPI:  Tigist Dave is a 68 y.o. female status post Doug-En-Y Gastric Bypass with [facility] in 2006. Comes to the office today to discuss further evaluation and management of a hiatal hernia.    Subjective     Patient presents to clinic accompanied by her family.  She states that she has suffered from significant reflux for several years.  She is currently taking omeprazole and Tums.  She additionally complains of some dysphagia, stating that she is only eating very small amounts of food.  She denies any associated chest pain or shortness of breath.    Review of Systems   Constitutional:  Negative for chills and fever.   HENT:  Positive for trouble swallowing. Negative for ear pain and sore throat.    Eyes:  Negative for pain and visual disturbance.   Respiratory:  Negative for cough and shortness of breath.    Cardiovascular:  Negative for chest pain and palpitations.   Gastrointestinal:  Negative for abdominal pain and vomiting.   Genitourinary:  Negative for dysuria and hematuria.   Musculoskeletal:  Negative for arthralgias and back pain.   Skin:  Negative for color change and rash.   Neurological:  Negative for seizures and syncope.   All other systems reviewed and are negative.      Historical Information   Past Medical History:   Diagnosis Date    Asthma     Colon polyp     COPD (chronic obstructive pulmonary disease) (HCC)     usually have a bout every winter    Crohn's disease (HCC)     GERD (gastroesophageal reflux disease) 2016    take an otc tablet at night     Past Surgical History:   Procedure Laterality Date    COLONOSCOPY      ESOPHAGEAL MANOMETRY      GASTRIC BYPASS LAPAROSCOPIC      HYSTERECTOMY Bilateral     35 years old    OOPHORECTOMY Bilateral     35 years old    TONSILLECTOMY       Social History   Social History     Substance and Sexual Activity   Alcohol Use Not  "Currently     Social History     Substance and Sexual Activity   Drug Use Never     Social History     Tobacco Use   Smoking Status Never   Smokeless Tobacco Never       Objective       Current Vitals:   Blood Pressure: 128/68 (11/13/24 1054)  Pulse: 68 (11/13/24 1054)  Temperature: 97.8 °F (36.6 °C) (11/13/24 1054)  Temp Source: Tympanic (11/13/24 1054)  Height: 5' 2\" (157.5 cm) (11/13/24 1054)  Weight - Scale: 90.5 kg (199 lb 8 oz) (11/13/24 1054)  SpO2: 98 % (11/13/24 1054)    Invasive Devices       None                   Physical Exam  Vitals reviewed.   Constitutional:       General: She is not in acute distress.     Appearance: Normal appearance. She is not ill-appearing.   HENT:      Head: Normocephalic and atraumatic.      Nose: Nose normal.      Mouth/Throat:      Mouth: Mucous membranes are moist.      Pharynx: Oropharynx is clear.   Eyes:      Extraocular Movements: Extraocular movements intact.      Conjunctiva/sclera: Conjunctivae normal.   Cardiovascular:      Rate and Rhythm: Normal rate.   Pulmonary:      Effort: Pulmonary effort is normal. No respiratory distress.   Abdominal:      General: There is no distension.      Palpations: Abdomen is soft.      Tenderness: There is no abdominal tenderness. There is no guarding or rebound.      Comments: Well healed incisions from prior surgery   Musculoskeletal:         General: No deformity. Normal range of motion.      Cervical back: Normal range of motion and neck supple.   Skin:     General: Skin is warm and dry.      Coloration: Skin is not jaundiced.   Neurological:      General: No focal deficit present.      Mental Status: She is alert and oriented to person, place, and time.   Psychiatric:         Mood and Affect: Mood normal.         Thought Content: Thought content normal.           Pathology, and Other Studies: Results Review Statement: I reviewed radiology reports from this admission including: CT abdomen/pelvis.      Assessment/PLAN:    Tigist TORRES" Jolie is a 68 y.o. female status [id number] Gastric Bypass with [facility] in 2006.    Workup thus far reviewed and discussed with patient: Significant for abnormal findings.  Workup includes:     UGI        EGD        Pathology from EGD         MANOMETRY/pH Study (3/2022)  Indication- esophageal dysphagia     Esophageal manometry  Esophageal motility- 9/10 swallows demonstrate normal esophageal contractibility pattern with mean DCI of 754 mmHg.s.cm  1/10 swallows demonstrated weak esophageal contractibility pattern     Increased pressure in the oropharynx region unclear significance of this.  ENT evaluation can be considered     LES- median IRP is within normal limits  Impedance- 30% with its of liquid bolus swallows     Rapid swallow index is less than 1     Redwood classification- normal esophageal motility  Increased pressure in the oropharynx region of unclear significance (artifact?).  ENT evaluation can be considered if dysphagia study oropharyngeal      CT abd/pel  FINDINGS:     ABDOMEN     LOWER CHEST: Hiatal hernia. Lung bases clear.     LIVER/BILIARY TREE: Unremarkable.     GALLBLADDER: No calcified gallstones. No pericholecystic inflammatory change.     SPLEEN: Unremarkable.     PANCREAS: Unremarkable.     ADRENAL GLANDS: Unremarkable.     KIDNEYS/URETERS: Unremarkable. No hydronephrosis or calculus. Left-sided parapelvic cyst.     STOMACH AND BOWEL: Status post Doug-en-Y gastric bypass. Gastric pouch and gastrojejunostomy located within the hiatal hernia. No contrast in the gastric remnant or biliopancreatic limb. Doug limb, common channel and colon well opacified with contrast.   Small bowel normal in caliber. No evidence of bowel obstruction.     APPENDIX: Normal.     ABDOMINOPELVIC CAVITY: No lymphadenopathy.  No ascites or discrete fluid collection.  No extraluminal gas.           VESSELS: Unremarkable for patient's age.     PELVIS     REPRODUCTIVE ORGANS: Post hysterectomy.     URINARY  BLADDER: Limited distention. Grossly normal.     ABDOMINAL WALL/INGUINAL REGIONS: Unremarkable.     BONES: No acute fracture or suspicious osseous lesion.     IMPRESSION:     1.  Status post Doug-en-Y gastric bypass.     2.  Gastric pouch and gastrojejunostomy located within a hiatal hernia. Otherwise normal postoperative appearance without fistula, small bowel obstruction or internal hernia.     3.  No evidence of acute abnormality in the abdomen or pelvis.    --------------------------------------------------------------------  Discussed with the patient that workup to this point is significant for findings of a hiatal hernia seen on her most recent CT of the abdomen and pelvis.  She continues to experience severe heartburn and dysphagia despite Omeprazole and Tums.  Given this, surgical options for hiatal hernia repair discussed with the patient and her family.  Will plan to obtain further workup with an upper GI study as well as upper endoscopy.  Patient to follow-up in clinic shortly after these are completed to discuss surgical options.    Plan:  - UGI  - Upper endoscopy  - Patient to follow up in clinic after these have resulted to discuss surgical options            Elvis Toure MD  Bariatric Surgery  11/13/2024  11:40 AM

## 2024-11-14 ENCOUNTER — HOSPITAL ENCOUNTER (OUTPATIENT)
Dept: RADIOLOGY | Facility: HOSPITAL | Age: 69
Discharge: HOME | End: 2024-11-14
Attending: STUDENT IN AN ORGANIZED HEALTH CARE EDUCATION/TRAINING PROGRAM
Payer: COMMERCIAL

## 2024-11-14 DIAGNOSIS — K44.9 HIATAL HERNIA: ICD-10-CM

## 2024-11-14 PROCEDURE — 74240 X-RAY XM UPR GI TRC 1CNTRST: CPT

## 2024-12-10 ENCOUNTER — TELEPHONE (OUTPATIENT)
Dept: BARIATRICS | Facility: CLINIC | Age: 69
End: 2024-12-10

## 2024-12-10 NOTE — TELEPHONE ENCOUNTER
Called and LVM to ensure pt knew of their EGD scheduled for 12/18. Let pt know to call us with any concerns or questions [phone]. Reminded patient that the hospital will call the day before with a time and not to eat or drink anything after midnight the day before and to have .

## 2024-12-17 RX ORDER — SODIUM CHLORIDE 9 MG/ML
125 INJECTION, SOLUTION INTRAVENOUS CONTINUOUS
Status: CANCELLED | OUTPATIENT
Start: 2024-12-17

## 2024-12-17 RX ORDER — ONDANSETRON 2 MG/ML
4 INJECTION INTRAMUSCULAR; INTRAVENOUS ONCE AS NEEDED
Status: CANCELLED | OUTPATIENT
Start: 2024-12-17

## 2024-12-18 ENCOUNTER — ANESTHESIA EVENT (OUTPATIENT)
Dept: GASTROENTEROLOGY | Facility: HOSPITAL | Age: 69
End: 2024-12-18
Payer: COMMERCIAL

## 2024-12-18 ENCOUNTER — ANESTHESIA (OUTPATIENT)
Dept: GASTROENTEROLOGY | Facility: HOSPITAL | Age: 69
End: 2024-12-18
Payer: COMMERCIAL

## 2024-12-18 ENCOUNTER — HOSPITAL ENCOUNTER (OUTPATIENT)
Dept: GASTROENTEROLOGY | Facility: HOSPITAL | Age: 69
Setting detail: OUTPATIENT SURGERY
Discharge: HOME | End: 2024-12-18
Attending: SURGERY
Payer: COMMERCIAL

## 2024-12-18 VITALS
WEIGHT: 199 LBS | DIASTOLIC BLOOD PRESSURE: 68 MMHG | OXYGEN SATURATION: 97 % | BODY MASS INDEX: 36.62 KG/M2 | HEART RATE: 68 BPM | TEMPERATURE: 97.1 F | HEIGHT: 62 IN | SYSTOLIC BLOOD PRESSURE: 120 MMHG | RESPIRATION RATE: 19 BRPM

## 2024-12-18 DIAGNOSIS — Z98.84 BARIATRIC SURGERY STATUS: ICD-10-CM

## 2024-12-18 PROCEDURE — 43235 EGD DIAGNOSTIC BRUSH WASH: CPT | Performed by: SURGERY

## 2024-12-18 RX ORDER — LIDOCAINE HYDROCHLORIDE 20 MG/ML
INJECTION, SOLUTION EPIDURAL; INFILTRATION; INTRACAUDAL; PERINEURAL AS NEEDED
Status: DISCONTINUED | OUTPATIENT
Start: 2024-12-18 | End: 2024-12-18

## 2024-12-18 RX ORDER — ONDANSETRON 2 MG/ML
4 INJECTION INTRAMUSCULAR; INTRAVENOUS ONCE AS NEEDED
Status: DISCONTINUED | OUTPATIENT
Start: 2024-12-18 | End: 2024-12-22 | Stop reason: HOSPADM

## 2024-12-18 RX ORDER — PROPOFOL 10 MG/ML
INJECTION, EMULSION INTRAVENOUS AS NEEDED
Status: DISCONTINUED | OUTPATIENT
Start: 2024-12-18 | End: 2024-12-18

## 2024-12-18 RX ORDER — SODIUM CHLORIDE 9 MG/ML
125 INJECTION, SOLUTION INTRAVENOUS CONTINUOUS
Status: DISCONTINUED | OUTPATIENT
Start: 2024-12-18 | End: 2024-12-22 | Stop reason: HOSPADM

## 2024-12-18 RX ORDER — SODIUM CHLORIDE 9 MG/ML
INJECTION, SOLUTION INTRAVENOUS CONTINUOUS PRN
Status: DISCONTINUED | OUTPATIENT
Start: 2024-12-18 | End: 2024-12-18

## 2024-12-18 RX ADMIN — PROPOFOL 50 MG: 10 INJECTION, EMULSION INTRAVENOUS at 10:50

## 2024-12-18 RX ADMIN — SODIUM CHLORIDE: 0.9 INJECTION, SOLUTION INTRAVENOUS at 10:46

## 2024-12-18 RX ADMIN — LIDOCAINE HYDROCHLORIDE 50 MG: 20 INJECTION, SOLUTION EPIDURAL; INFILTRATION; INTRACAUDAL at 10:48

## 2024-12-18 RX ADMIN — PROPOFOL 30 MG: 10 INJECTION, EMULSION INTRAVENOUS at 10:51

## 2024-12-18 RX ADMIN — PROPOFOL 120 MG: 10 INJECTION, EMULSION INTRAVENOUS at 10:49

## 2024-12-18 NOTE — ANESTHESIA POSTPROCEDURE EVALUATION
Post-Op Assessment Note    CV Status:  Stable    Pain management: adequate       Mental Status:  Alert and awake   Hydration Status:  Euvolemic   PONV Controlled:  Controlled   Airway Patency:  Patent     Post Op Vitals Reviewed: Yes    No anethesia notable event occurred.    Staff: Anesthesiologist           Last Filed PACU Vitals:  Vitals Value Taken Time   Temp     Pulse 68 12/18/24 1111   /68 12/18/24 1111   Resp 19 12/18/24 1111   SpO2 97 % 12/18/24 1111       Modified Shruthi:  Activity: 2 (12/18/2024 11:11 AM)  Respiration: 2 (12/18/2024 11:11 AM)  Circulation: 2 (12/18/2024 11:11 AM)  Consciousness: 2 (12/18/2024 11:11 AM)  Oxygen Saturation: 2 (12/18/2024 11:11 AM)  Modified Shruthi Score: 10 (12/18/2024 11:11 AM)

## 2024-12-18 NOTE — ANESTHESIA PREPROCEDURE EVALUATION
Procedure:  EGD    Relevant Problems   CARDIO   (+) Thrombosed external hemorrhoid      GI/HEPATIC   (+) Hiatal hernia      MUSCULOSKELETAL   (+) Hiatal hernia      Surgery/Wound/Pain   (+) Postsurgical malabsorption   (+) S/P gastric bypass      Other   (+) Obesity, Class II, BMI 35-39.9        Physical Exam    Airway    Mallampati score: II         Dental   No notable dental hx     Cardiovascular  Cardiovascular exam normal    Pulmonary  Pulmonary exam normal     Other Findings  post-pubertal.      Anesthesia Plan  ASA Score- 2     Anesthesia Type- IV sedation with anesthesia with ASA Monitors.         Additional Monitors:     Airway Plan:            Plan Factors-Exercise tolerance (METS): >4 METS.    Chart reviewed.        Patient is not a current smoker.              Induction- intravenous.    Postoperative Plan-         Informed Consent- Anesthetic plan and risks discussed with patient.

## 2024-12-18 NOTE — H&P
H&P EXAM - Outpatient Endoscopy  AL [facility] AL GI LAB INTRA   Tigist Dave 69 y.o. female MRN: [id number]  Unit/Bed#:  Encounter: [id number]        Impression: GERD s/p Doug en Y Gastric Bypass    Plan:Upper endoscopy     Chief Complaint: GERD    Physical Exam: Normal not in acute distress   Chest: Clear to auscultation   Heart: Normal S1 and S2

## 2024-12-18 NOTE — ANESTHESIA POSTPROCEDURE EVALUATION
Post-Op Assessment Note    CV Status:  Stable    Pain management: adequate       Mental Status:  Sleepy   Hydration Status:  Euvolemic   PONV Controlled:  Controlled   Airway Patency:  Patent  Two or more mitigation strategies used for obstructive sleep apnea   Post Op Vitals Reviewed: Yes    No anethesia notable event occurred.    Staff: Anesthesiologist, CRNA           Last Filed PACU Vitals:  Vitals Value Taken Time   Temp     Pulse 80    /75    Resp 15    SpO2 97        Modified Shruthi:  Activity: 2 (12/18/2024 10:07 AM)  Respiration: 2 (12/18/2024 10:07 AM)  Consciousness: 2 (12/18/2024 10:07 AM)  Oxygen Saturation: 2 (12/18/2024 10:07 AM)

## 2024-12-19 ENCOUNTER — OFFICE VISIT (OUTPATIENT)
Dept: BARIATRICS | Facility: CLINIC | Age: 69
End: 2024-12-19
Payer: COMMERCIAL

## 2024-12-19 VITALS
TEMPERATURE: 98.6 F | HEIGHT: 62 IN | WEIGHT: 197 LBS | SYSTOLIC BLOOD PRESSURE: 138 MMHG | BODY MASS INDEX: 36.25 KG/M2 | DIASTOLIC BLOOD PRESSURE: 88 MMHG | HEART RATE: 78 BPM

## 2024-12-19 DIAGNOSIS — K44.9 HIATAL HERNIA: Primary | ICD-10-CM

## 2024-12-19 PROCEDURE — 99214 OFFICE O/P EST MOD 30 MIN: CPT | Performed by: SURGERY

## 2024-12-19 NOTE — PROGRESS NOTES
Date of surgery: 2006   Procedure: RNY   Performing surgeon: Dr. Cuba     Initial Weight - 230 lb  Current Weight - 197 lb  Milo Weight - 150 lb  Total Body Weight Loss (EWL)- 2%  EWL% - 3%  TWB % -1%

## 2024-12-19 NOTE — PROGRESS NOTES
"  Follow Up - Bariatric Surgery   Tigist Dave 69 y.o. female MRN: [id number]  Unit/Bed#:  Encounter: [id number]            Subjective: Continues to have dysphagia chronic nausea and vomiting and heartburn    Objective:         Lab, Imaging and other studies: I have personally reviewed pertinent labs.    Family History: Family history non-contributory    Meds/Allergies   all medications and allergies reviewed    Vitals: Blood pressure 138/88, pulse 78, temperature 98.6 °F (37 °C), temperature source Tympanic, height 5' 1.5\" (1.562 m), weight 89.4 kg (197 lb).,Body mass index is 36.62 kg/m².    [unfilled]    Invasive Devices       None                   Physical Exam:     NAD  Abdomen soft non-tender, incisions well healed  No palpable hernias    Assessment/Plan:    patient is s/p laparoscopic Doug-en-Y gastric bypass by Dr. Cuba at [facility] with large hiatal hernia and intrathoracic gastric pouch.  I reviewed today all her diagnostic studies including upper GI and the upper endoscopy and reviewed the operative plan with her and her family members and decided to proceed with a hiatal hernia repair.  All questions were answered all concerns were addressed              Papa Jiménez MD      "

## 2024-12-20 ENCOUNTER — TELEPHONE (OUTPATIENT)
Dept: BARIATRICS | Facility: CLINIC | Age: 69
End: 2024-12-20

## 2024-12-20 DIAGNOSIS — K44.9 HIATAL HERNIA: Primary | ICD-10-CM

## 2024-12-20 NOTE — TELEPHONE ENCOUNTER
Patient was called informed she needs to have labs completed in her chart and to see a cardiologist for an EKG and written cardiac clearance once these have been completed to contact the office so I can see if case needs to be preauthorized.

## 2024-12-24 LAB
ALBUMIN SERPL-MCNC: 3.8 G/DL (ref 3.6–5.1)
ALBUMIN/GLOB SERPL: 1.4 (CALC) (ref 1–2.5)
ALP SERPL-CCNC: 102 U/L (ref 37–153)
ALT SERPL-CCNC: 5 U/L (ref 6–29)
AST SERPL-CCNC: 21 U/L (ref 10–35)
BILIRUB SERPL-MCNC: 0.3 MG/DL (ref 0.2–1.2)
BUN SERPL-MCNC: 9 MG/DL (ref 7–25)
BUN/CREAT SERPL: 16 (CALC) (ref 6–22)
CALCIUM SERPL-MCNC: 9 MG/DL (ref 8.6–10.4)
CHLORIDE SERPL-SCNC: 108 MMOL/L (ref 98–110)
CO2 SERPL-SCNC: 30 MMOL/L (ref 20–32)
CREAT SERPL-MCNC: 0.55 MG/DL (ref 0.6–1)
ERYTHROCYTE [DISTWIDTH] IN BLOOD BY AUTOMATED COUNT: 15.2 % (ref 11–15)
GFR/BSA.PRED SERPLBLD CYS-BASED-ARV: 99 ML/MIN/1.73M2
GLOBULIN SER CALC-MCNC: 2.8 G/DL (CALC) (ref 1.9–3.7)
GLUCOSE SERPL-MCNC: 90 MG/DL (ref 65–99)
HCT VFR BLD AUTO: 39.6 % (ref 35–45)
HGB BLD-MCNC: 12.5 G/DL (ref 11.7–15.5)
MCH RBC QN AUTO: 25.4 PG (ref 27–33)
MCHC RBC AUTO-ENTMCNC: 31.6 G/DL (ref 32–36)
MCV RBC AUTO: 80.3 FL (ref 80–100)
PLATELET # BLD AUTO: 364 THOUSAND/UL (ref 140–400)
PMV BLD REES-ECKER: 9.5 FL (ref 7.5–12.5)
POTASSIUM SERPL-SCNC: 4.2 MMOL/L (ref 3.5–5.3)
PROT SERPL-MCNC: 6.6 G/DL (ref 6.1–8.1)
RBC # BLD AUTO: 4.93 MILLION/UL (ref 3.8–5.1)
SODIUM SERPL-SCNC: 144 MMOL/L (ref 135–146)
WBC # BLD AUTO: 7 THOUSAND/UL (ref 3.8–10.8)

## 2025-01-09 ENCOUNTER — OFFICE VISIT (OUTPATIENT)
Dept: CARDIOLOGY CLINIC | Facility: CLINIC | Age: 70
End: 2025-01-09
Payer: COMMERCIAL

## 2025-01-09 VITALS
SYSTOLIC BLOOD PRESSURE: 120 MMHG | BODY MASS INDEX: 37.19 KG/M2 | OXYGEN SATURATION: 99 % | HEIGHT: 61 IN | DIASTOLIC BLOOD PRESSURE: 80 MMHG | HEART RATE: 65 BPM | WEIGHT: 197 LBS

## 2025-01-09 DIAGNOSIS — Z98.84 S/P GASTRIC BYPASS: ICD-10-CM

## 2025-01-09 DIAGNOSIS — Z01.810 PRE-OPERATIVE CARDIOVASCULAR EXAMINATION: Primary | ICD-10-CM

## 2025-01-09 DIAGNOSIS — E66.812 OBESITY, CLASS II, BMI 35-39.9: ICD-10-CM

## 2025-01-09 DIAGNOSIS — E78.2 MIXED HYPERLIPIDEMIA: ICD-10-CM

## 2025-01-09 DIAGNOSIS — K44.9 HIATAL HERNIA: ICD-10-CM

## 2025-01-09 DIAGNOSIS — Z82.49 FAMILY HISTORY OF PREMATURE CAD: ICD-10-CM

## 2025-01-09 PROCEDURE — 93000 ELECTROCARDIOGRAM COMPLETE: CPT | Performed by: STUDENT IN AN ORGANIZED HEALTH CARE EDUCATION/TRAINING PROGRAM

## 2025-01-09 PROCEDURE — 99204 OFFICE O/P NEW MOD 45 MIN: CPT | Performed by: STUDENT IN AN ORGANIZED HEALTH CARE EDUCATION/TRAINING PROGRAM

## 2025-01-09 NOTE — LETTER
Cardiology Pre Operative Risk Assessment      PRE OPERATIVE CARDIAC RISK ASSESSMENT    01/09/25    Tigist Dave  1955  [id number]    Date of Surgery: n/a    Type of Surgery: laparoscopic hiatal hernia repair    Surgeon: Dr. Cameron Jiménez    No Cardiac Contraindication for Planned Surgical Procedures    Anticoagulation: not applicable    Physician Comment:   Recommend Lp(a) check due to family history of CAD. This does not need to be done prior to surgery.    Electronically Signed:   Anneliese Alcantar MD, PhD  Cardiology

## 2025-01-09 NOTE — ASSESSMENT & PLAN NOTE
Stable but problematic.  Patient is currently undergoing evaluation by Dr. Cameron Jiménez for hiatal hernia repair.

## 2025-01-09 NOTE — LETTER
"2025     Jean Carlos Enriquez, DO  [address] [facility] [address]    Patient: Tigist Dave   YOB: 1955   Date of Visit: 2025       Dear Dr. Enriquez:    Thank you for referring Tigist Dave to me for evaluation. Below are my notes for this consultation.    If you have questions, please do not hesitate to call me. I look forward to following your patient along with you.         Sincerely,        Anneliese Alcantar MD        CC: MD Anneliese Conteh MD  2025  4:22 PM  Sign when Signing Visit  [facility] CARDIOLOGY [address] [address]  Phone#  [phone]  Fax#  [phone]  [facility] Office Consultation             NAME: Tigist Dave  AGE: 69 y.o. SEX: female   : 1955   MRN: [id number]    DATE: 2025  TIME: 4:18 PM    Assessment & Plan  Pre-operative cardiovascular examination  Pre-Op Evaluation Assessment-    Results for [place] Perioperative Cardiac Risk   Estimated Risk Probability for Perioperative Myocardial Infarction or Cardiac Arrest: 0.06 %    Answers calculated to formulate result:  1. Age? -- 69 Years  2. Creatinine? -- <133 µmol/L  3. ASA Class? -- Patients with mild systemic disease  4. Preoperative Functional Status? -- Total independent  5. Procedure Site? -- Bariatric      RCRI Cardiac Risk Estimation:    RCRI risk factors: \"high-risk\" surgery (intraperitoneal, intrathoracic, or suprainguinal vascular)  RCI RISK CLASS II (1 risk factor, risk of major cardiac compl. appr. 1.3%)    No cardiac contraindications to planned surgery    Elevated for major adverse cardiac event (MACE).   Elevated risk due to type of surgery.  However, patient overall has excellent functional capacity, normal ECG, no cardiac symptoms.  Patient may proceed with surgery as planned without further workup.    Pre-Op Evaluation Plan  1. Further preoperative workup as follows:   - None; no further preoperative work-up is " required    2. Medication Management/Recommendations:   - None, continue medication regimen including morning of surgery, with sip of water    Continue statin perioperatively.  Patient reports cholesterol levels are well-controlled per primary care physician.  Patient is at slightly increased risk of coronary events with Crohn's and family history, but she is currently asymptomatic and has excellent functional capacity so no further testing indicated at this time.  She is not taking aspirin due to history of gastric bypass surgery may benefit from LP(a)  May benefit from LP(a) testing due to family history, see below  Hiatal hernia  Stable but problematic.  Patient is currently undergoing evaluation by Dr. Cameron Jiménez for hiatal hernia repair.  S/P gastric bypass  Stable.  Obesity, Class II, BMI 35-39.9    Family history of premature CAD  Patient reports that her cholesterol levels are normal based on labs obtained at her primary care office.  I do not have these records available.  I recommend that she continue taking her rosuvastatin 10 mg daily perioperatively  We also discussed potential benefits of checking LP(a) level due to her family history of coronary artery disease.  She will discuss this with her primary care physician.  Mixed hyperlipidemia  We discussed lipid panel. Noted that these results on Epic were obtained prior to her starting on statin. She reports levels are better now.  Continue statin  Consider Lp(a) check       Follow-up as needed    -----    Chief Complaint   Patient presents with   • New Patient Visit     new patient pre op for hernia surgery - referred by Dr. Cameron Jiménez     No cardiac complaints        HPI:    Tigist Dave is a 69 y.o. female who has been referred here for assessment of preoperative cardiovascular risk prior to upcoming surgery. Preoperative consultation is at the request of Dr. Cameron Jiménez who plans on performing hiatal hernia repair.     Her past medical history is  significant for class II obesity status post Doug-en-Y gastric bypass surgery complicated by postoperative malabsorption, recently found to have hiatal hernia causing nausea and vomiting, Crohn's disease.  She presents today for preoperative evaluation.    She reports that from a cardiovascular standpoint, she is not having any symptoms.  She denies any chest pain, chest discomfort, shortness of breath at rest, dyspnea on exertion, lightheadedness, dizziness.  She is able to walk for exercise daily.  She is able to walk greater than 4 blocks without shortness of breath.  She is able to walk up at least 20 stairs, and is limited by mobility but not dyspnea.  She recently had an endoscopy and did not have any complications from the anesthesia.      She reports a significant family history of coronary artery disease.  Her mother had an MI and she reports that many of her maternal relatives also had early MIs.      -----    Pre-operative evaluation:  Current anti-platelet/anti-coagulation medications that the patient is prescribed includes: none.      Exercise Capacity:  Able to walk 4 blocks without symptoms?: Yes  Able to walk 2 flights without symptoms?: Yes    Personal history of venous thromboembolic disease? No    Assessment of Cardiac Contraindications:  No history of severe angina or MI in the last 6 weeks. No recent PCI.  No recent decompensated heart failure   No recent serious arrhythmias   No known severe heart valve disease including severe aortic stenosis or symptomatic mitral stenosis        Past history, family history, social history, current medications, vital signs, recent lab and imaging studies and  prior cardiology studies reviewed independently on this visit.    Allergies   Allergen Reactions   • Biaxin [Clarithromycin] Rash       Current Outpatient Medications:   •  dicyclomine (BENTYL) 10 mg capsule, Take 1 capsule (10 mg total) by mouth 4 (four) times a day (before meals and at bedtime)  (Patient taking differently: Take 10 mg by mouth as needed), Disp: 120 capsule, Rfl: 0  •  Omega-3 Fatty Acids (FISH OIL PO), Take by mouth, Disp: , Rfl:   •  omeprazole (PriLOSEC) 20 mg delayed release capsule, Take 1 capsule (20 mg total) by mouth daily, Disp: 30 capsule, Rfl: 0  •  rosuvastatin (CRESTOR) 10 MG tablet, , Disp: , Rfl:     Past Medical History:   Diagnosis Date   • Asthma    • Colon polyp    • COPD (chronic obstructive pulmonary disease) (HCC)     usually have a bout every winter   • Crohn's disease (HCC)    • GERD (gastroesophageal reflux disease) 2016    take an otc tablet at night     Past Surgical History:   Procedure Laterality Date   • COLONOSCOPY     • ESOPHAGEAL MANOMETRY     • GASTRIC BYPASS LAPAROSCOPIC     • HYSTERECTOMY Bilateral     35 years old   • OOPHORECTOMY Bilateral     35 years old   • TONSILLECTOMY       Family History   Problem Relation Age of Onset   • Coronary artery disease Mother    • Diabetes Father    • Pancreatic cancer Sister 52   • Cancer Sister    • No Known Problems Maternal Grandmother    • No Known Problems Maternal Grandfather    • No Known Problems Paternal Grandmother    • No Known Problems Paternal Grandfather    • No Known Problems Sister    • No Known Problems Maternal Aunt    • No Known Problems Maternal Aunt    • No Known Problems Maternal Aunt    • No Known Problems Maternal Aunt    • No Known Problems Maternal Aunt    • No Known Problems Maternal Aunt    • No Known Problems Paternal Aunt    • No Known Problems Paternal Aunt    • Breast cancer Cousin    • Breast cancer Cousin 50   • Breast cancer Cousin 68   • Asthma Brother    • Colon cancer Neg Hx        Social History   reports that she has never smoked. She has never used smokeless tobacco. She reports that she does not currently use alcohol. She reports that she does not use drugs.     Review of Systems   Constitutional:  Negative for fatigue.   Respiratory:  Negative for chest tightness and shortness  of breath.    Cardiovascular:  Negative for chest pain, palpitations and leg swelling.   Gastrointestinal:  Positive for abdominal pain, nausea and vomiting.   Psychiatric/Behavioral: Negative.         Objective:     Vitals:    01/09/25 1543   BP: 120/80   Pulse: 65   SpO2: 99%     Physical Exam  Vitals reviewed.   Constitutional:       General: She is not in acute distress.     Appearance: She is well-developed. She is obese.   HENT:      Head: Normocephalic and atraumatic.   Eyes:      Conjunctiva/sclera: Conjunctivae normal.   Cardiovascular:      Rate and Rhythm: Normal rate and regular rhythm.      Heart sounds: No murmur heard.  Pulmonary:      Effort: Pulmonary effort is normal. No respiratory distress.      Breath sounds: Normal breath sounds.   Musculoskeletal:         General: No swelling.      Cervical back: Neck supple.   Skin:     General: Skin is warm and dry.   Neurological:      Mental Status: She is alert.   Psychiatric:         Mood and Affect: Mood normal.         Pertinent Laboratory/Diagnostic Studies:    Laboratory studies reviewed personally by Anneliese Alcantar MD    BMP:   Lab Results   Component Value Date    SODIUM 144 12/23/2024    K 4.2 12/23/2024     12/23/2024    CO2 30 12/23/2024    BUN 9 12/23/2024    CREATININE 0.55 (L) 12/23/2024    GLUC 90 12/23/2024    CALCIUM 9.0 12/23/2024     CBC:  Lab Results   Component Value Date    WBC 7.0 12/23/2024    HGB 12.5 12/23/2024    HCT 39.6 12/23/2024    MCV 80.3 12/23/2024     12/23/2024     Lipid Profile:   Lab Results   Component Value Date    HDL 46 01/16/2020     Lab Results   Component Value Date    LDLCALC 148 (H) 01/16/2020     Lab Results   Component Value Date    TRIG 159 (H) 01/16/2020      Other labs:  Lab Results   Component Value Date    FDB7BOJYYWGI 3.900 (H) 01/16/2020     Lab Results   Component Value Date    ALT 5 (L) 12/23/2024    AST 21 12/23/2024       Imaging Studies:     Pertinent cardiac studies and imaging  "studies were personally reviewed on this visit and results summarized.    Anneliese Alcantar MD, PhD    Portions of the record may have been created with voice recognition software.  Occasional wrong word or \"sound alike\" substitutions may have occurred due to the inherent limitations of voice recognition software.  Read the chart carefully and recognize, using context, where substitutions have occurred. Please reach out to me directly for any clarifications.    "

## 2025-01-09 NOTE — PROGRESS NOTES
"[facility] [place]  [address]  [facility] [address]  Phone#  [phone]  Fax#  [phone]  [facility] Cardiology Office Consultation             NAME: Tigist Dave  AGE: 69 y.o. SEX: female   : 1955   MRN: [id number]    DATE: 2025  TIME: 4:26 PM    Assessment & Plan  Pre-operative cardiovascular examination  Pre-Op Evaluation Assessment-    Results for [place] Perioperative Cardiac Risk   Estimated Risk Probability for Perioperative Myocardial Infarction or Cardiac Arrest: 0.06 %    Answers calculated to formulate result:  1. Age? -- 69 Years  2. Creatinine? -- <133 µmol/L  3. ASA Class? -- Patients with mild systemic disease  4. Preoperative Functional Status? -- Total independent  5. Procedure Site? -- Bariatric      RCRI Cardiac Risk Estimation:    RCRI risk factors: \"high-risk\" surgery (intraperitoneal, intrathoracic, or suprainguinal vascular)  RCI RISK CLASS II (1 risk factor, risk of major cardiac compl. appr. 1.3%)    No cardiac contraindications to planned surgery    Elevated for major adverse cardiac event (MACE).   Elevated risk due to type of surgery.  However, patient overall has excellent functional capacity, normal ECG, no cardiac symptoms.  Patient may proceed with surgery as planned without further workup.    Pre-Op Evaluation Plan  1. Further preoperative workup as follows:   - None; no further preoperative work-up is required    2. Medication Management/Recommendations:   - None, continue medication regimen including morning of surgery, with sip of water    Continue statin perioperatively.  Patient reports cholesterol levels are well-controlled per primary care physician.  Patient is at slightly increased risk of coronary events with Crohn's and family history, but she is currently asymptomatic and has excellent functional capacity so no further testing indicated at this time.  She is not taking aspirin due to history of gastric bypass surgery may " benefit from LP(a)  May benefit from LP(a) testing due to family history, see below  Hiatal hernia  Stable but problematic.  Patient is currently undergoing evaluation by Dr. Cameron Jiménez for hiatal hernia repair.  S/P gastric bypass  Stable.  Obesity, Class II, BMI 35-39.9    Family history of premature CAD  Patient reports that her cholesterol levels are normal based on labs obtained at her primary care office.  I do not have these records available.  I recommend that she continue taking her rosuvastatin 10 mg daily perioperatively  We also discussed potential benefits of checking LP(a) level due to her family history of coronary artery disease.  She will discuss this with her primary care physician.  Mixed hyperlipidemia  We discussed lipid panel. Noted that these results on Epic were obtained prior to her starting on statin. She reports levels are better now.  Continue statin  Consider Lp(a) check       Follow-up as needed    -----    Chief Complaint   Patient presents with    New Patient Visit     new patient pre op for hernia surgery - referred by Dr. Cameron Jiménez     No cardiac complaints        HPI:    Tigist Dave is a 69 y.o. female who has been referred here for assessment of preoperative cardiovascular risk prior to upcoming surgery. Preoperative consultation is at the request of Dr. Cameron Jiménez who plans on performing hiatal hernia repair.     Her past medical history is significant for class II obesity status post Doug-en-Y gastric bypass surgery complicated by postoperative malabsorption, recently found to have hiatal hernia causing nausea and vomiting, Crohn's disease.  She presents today for preoperative evaluation.    She reports that from a cardiovascular standpoint, she is not having any symptoms.  She denies any chest pain, chest discomfort, shortness of breath at rest, dyspnea on exertion, lightheadedness, dizziness.  She is able to walk for exercise daily.  She is able to walk greater than 4 blocks  without shortness of breath.  She is able to walk up at least 20 stairs, and is limited by mobility but not dyspnea.  She recently had an endoscopy and did not have any complications from the anesthesia.      She reports a significant family history of coronary artery disease.  Her mother had an MI and she reports that many of her maternal relatives also had early MIs.      -----    Pre-operative evaluation:  Current anti-platelet/anti-coagulation medications that the patient is prescribed includes: none.      Exercise Capacity:  Able to walk 4 blocks without symptoms?: Yes  Able to walk 2 flights without symptoms?: Yes    Personal history of venous thromboembolic disease? No    Assessment of Cardiac Contraindications:  No history of severe angina or MI in the last 6 weeks. No recent PCI.  No recent decompensated heart failure   No recent serious arrhythmias   No known severe heart valve disease including severe aortic stenosis or symptomatic mitral stenosis        Past history, family history, social history, current medications, vital signs, recent lab and imaging studies and  prior cardiology studies reviewed independently on this visit.    Allergies   Allergen Reactions    Biaxin [Clarithromycin] Rash       Current Outpatient Medications:     dicyclomine (BENTYL) 10 mg capsule, Take 1 capsule (10 mg total) by mouth 4 (four) times a day (before meals and at bedtime) (Patient taking differently: Take 10 mg by mouth as needed), Disp: 120 capsule, Rfl: 0    Omega-3 Fatty Acids (FISH OIL PO), Take by mouth, Disp: , Rfl:     omeprazole (PriLOSEC) 20 mg delayed release capsule, Take 1 capsule (20 mg total) by mouth daily, Disp: 30 capsule, Rfl: 0    rosuvastatin (CRESTOR) 10 MG tablet, , Disp: , Rfl:     Past Medical History:   Diagnosis Date    Asthma     Colon polyp     COPD (chronic obstructive pulmonary disease) (HCC)     usually have a bout every winter    Crohn's disease (HCC)     GERD (gastroesophageal reflux  disease) 2016    take an otc tablet at night     Past Surgical History:   Procedure Laterality Date    COLONOSCOPY      ESOPHAGEAL MANOMETRY      GASTRIC BYPASS LAPAROSCOPIC      HYSTERECTOMY Bilateral     35 years old    OOPHORECTOMY Bilateral     35 years old    TONSILLECTOMY       Family History   Problem Relation Age of Onset    Coronary artery disease Mother     Diabetes Father     Pancreatic cancer Sister 52    Cancer Sister     No Known Problems Maternal Grandmother     No Known Problems Maternal Grandfather     No Known Problems Paternal Grandmother     No Known Problems Paternal Grandfather     No Known Problems Sister     No Known Problems Maternal Aunt     No Known Problems Maternal Aunt     No Known Problems Maternal Aunt     No Known Problems Maternal Aunt     No Known Problems Maternal Aunt     No Known Problems Maternal Aunt     No Known Problems Paternal Aunt     No Known Problems Paternal Aunt     Breast cancer Cousin     Breast cancer Cousin 50    Breast cancer Cousin 68    Asthma Brother     Colon cancer Neg Hx        Social History   reports that she has never smoked. She has never used smokeless tobacco. She reports that she does not currently use alcohol. She reports that she does not use drugs.     Review of Systems   Constitutional:  Negative for fatigue.   Respiratory:  Negative for chest tightness and shortness of breath.    Cardiovascular:  Negative for chest pain, palpitations and leg swelling.   Gastrointestinal:  Positive for abdominal pain, nausea and vomiting.   Psychiatric/Behavioral: Negative.         Objective:     Vitals:    01/09/25 1543   BP: 120/80   Pulse: 65   SpO2: 99%     Physical Exam  Vitals reviewed.   Constitutional:       General: She is not in acute distress.     Appearance: She is well-developed. She is obese.   HENT:      Head: Normocephalic and atraumatic.   Eyes:      Conjunctiva/sclera: Conjunctivae normal.   Cardiovascular:      Rate and Rhythm: Normal rate and  "regular rhythm.      Heart sounds: No murmur heard.  Pulmonary:      Effort: Pulmonary effort is normal. No respiratory distress.      Breath sounds: Normal breath sounds.   Musculoskeletal:         General: No swelling.      Cervical back: Neck supple.   Skin:     General: Skin is warm and dry.   Neurological:      Mental Status: She is alert.   Psychiatric:         Mood and Affect: Mood normal.         Pertinent Laboratory/Diagnostic Studies:    Laboratory studies reviewed personally by Anneliese Alcantar MD    BMP:   Lab Results   Component Value Date    SODIUM 144 12/23/2024    K 4.2 12/23/2024     12/23/2024    CO2 30 12/23/2024    BUN 9 12/23/2024    CREATININE 0.55 (L) 12/23/2024    GLUC 90 12/23/2024    CALCIUM 9.0 12/23/2024     CBC:  Lab Results   Component Value Date    WBC 7.0 12/23/2024    HGB 12.5 12/23/2024    HCT 39.6 12/23/2024    MCV 80.3 12/23/2024     12/23/2024     Lipid Profile:   Lab Results   Component Value Date    HDL 46 01/16/2020     Lab Results   Component Value Date    LDLCALC 148 (H) 01/16/2020     Lab Results   Component Value Date    TRIG 159 (H) 01/16/2020      Other labs:  Lab Results   Component Value Date    [id number] 3.900 (H) 01/16/2020     Lab Results   Component Value Date    ALT 5 (L) 12/23/2024    AST 21 12/23/2024       Imaging Studies:     Pertinent cardiac studies and imaging studies were personally reviewed on this visit and results summarized.    Anneliese Alcantar MD, PhD    Portions of the record may have been created with voice recognition software.  Occasional wrong word or \"sound alike\" substitutions may have occurred due to the inherent limitations of voice recognition software.  Read the chart carefully and recognize, using context, where substitutions have occurred. Please reach out to me directly for any clarifications.    "

## 2025-01-13 ENCOUNTER — PREP FOR PROCEDURE (OUTPATIENT)
Dept: BARIATRICS | Facility: CLINIC | Age: 70
End: 2025-01-13

## 2025-01-13 ENCOUNTER — TELEPHONE (OUTPATIENT)
Dept: BARIATRICS | Facility: CLINIC | Age: 70
End: 2025-01-13

## 2025-01-13 DIAGNOSIS — R11.10 VOMITING: ICD-10-CM

## 2025-01-13 DIAGNOSIS — K21.9 ESOPHAGEAL REFLUX: ICD-10-CM

## 2025-01-13 DIAGNOSIS — R13.10 PROBLEMS WITH SWALLOWING: ICD-10-CM

## 2025-01-13 DIAGNOSIS — K44.9 HIATAL HERNIA: Primary | ICD-10-CM

## 2025-01-13 DIAGNOSIS — R12 HEARTBURN: ICD-10-CM

## 2025-01-13 DIAGNOSIS — Z98.84 S/P BARIATRIC SURGERY: ICD-10-CM

## 2025-01-13 NOTE — TELEPHONE ENCOUNTER
Reached out to Pt re: [employer] message. Pt confirmed all pre-op testing and clearances are complete. Advised Pt her case would be submitted to insurance and we will be in touch once we have an answer. Pt verbalized understanding and was agreeable to plan.    Notified  of Pt's completed pre-op testing/ clearances.

## 2025-01-28 ENCOUNTER — ANESTHESIA EVENT (OUTPATIENT)
Dept: PERIOP | Facility: HOSPITAL | Age: 70
End: 2025-01-28
Payer: COMMERCIAL

## 2025-01-30 ENCOUNTER — OFFICE VISIT (OUTPATIENT)
Dept: BARIATRICS | Facility: CLINIC | Age: 70
End: 2025-01-30
Payer: COMMERCIAL

## 2025-01-30 VITALS
OXYGEN SATURATION: 99 % | DIASTOLIC BLOOD PRESSURE: 74 MMHG | SYSTOLIC BLOOD PRESSURE: 128 MMHG | TEMPERATURE: 97.8 F | WEIGHT: 196 LBS | HEIGHT: 61 IN | HEART RATE: 84 BPM | BODY MASS INDEX: 37 KG/M2

## 2025-01-30 DIAGNOSIS — K44.9 HIATAL HERNIA: Primary | ICD-10-CM

## 2025-01-30 PROCEDURE — 99213 OFFICE O/P EST LOW 20 MIN: CPT | Performed by: SURGERY

## 2025-01-30 RX ORDER — HEPARIN SODIUM 5000 [USP'U]/ML
5000 INJECTION, SOLUTION INTRAVENOUS; SUBCUTANEOUS ONCE
Status: CANCELLED | OUTPATIENT
Start: 2025-02-04 | End: 2025-01-30

## 2025-01-30 RX ORDER — CEFAZOLIN SODIUM 2 G/50ML
2000 SOLUTION INTRAVENOUS ONCE
Status: CANCELLED | OUTPATIENT
Start: 2025-02-04 | End: 2025-01-30

## 2025-01-30 RX ORDER — ACETAMINOPHEN 10 MG/ML
1000 INJECTION, SOLUTION INTRAVENOUS ONCE
Status: CANCELLED | OUTPATIENT
Start: 2025-02-04 | End: 2025-01-30

## 2025-01-30 RX ORDER — CELECOXIB 200 MG/1
200 CAPSULE ORAL ONCE
Status: CANCELLED | OUTPATIENT
Start: 2025-02-04 | End: 2025-01-30

## 2025-01-30 NOTE — PROGRESS NOTES
[facility]  [address] [address]  [phone]        DATE OF SERVICE:  12/18/24     PHYSICIAN(S):  Attending:   Papa Jiménez MD      Fellow:   No Staff Documented         INDICATION:  Bariatric surgery status     POST-OP DIAGNOSIS:  See the impression below.     PREPROCEDURE:  Informed consent was obtained for the procedure, including sedation.  Risks of perforation, hemorrhage, adverse drug reaction and aspiration were discussed. The patient was placed in the left lateral decubitus position.     Patient was explained about the risks and benefits of the procedure. Risks including but not limited to bleeding, infection, and perforation were explained in detail. Also explained about less than 100% sensitivity with the exam and other alternatives.     PROCEDURE: EGD     DETAILS OF PROCEDURE:   Patient was taken to the procedure room where a time out was performed to confirm correct patient and correct procedure. The patient underwent monitored anesthesia care, which was administered by an anesthesia professional. The patient's blood pressure, heart rate, level of consciousness, respirations, oxygen, ECG and ETCO2 were monitored throughout the procedure. The scope was introduced through the mouth and advanced to the second part of the duodenum. Retroflexion was performed in the fundus. The patient experienced no blood loss. The procedure was not difficult. The patient tolerated the procedure well. There were no apparent adverse events.      ANESTHESIA INFORMATION:  ASA: II  Anesthesia Type: IV Sedation with Anesthesia     MEDICATIONS:  No administrations occurring from 1047 to 1049 on 12/18/24         FINDINGS:  The esophagus, gastric pouch and Doug limb appeared normal.  Regular Z-line 35 cm from the incisors  Previous Doug-en-Y gastric bypass in the stomach. I could not see the diaphragmatic pinch most likely because the entire gastric pouch is in the thoracic cavity  as seen on imaging studies        SPECIMENS:  * No specimens in log *        IMPRESSION:  The esophagus, gastric pouch and doug limb appeared normal.  Previous Doug-en-Y gastric bypass in the stomach        RECOMMENDATION:  There is no recommended follow-up for this procedure.         UPPER GI SERIES -SINGLE CONTRAST     INDICATION: K44.9: Diaphragmatic hernia without obstruction or gangrene.     COMPARISON: 11/9/2024, 12/14/2020     TECHNIQUE: The patient was given barium by mouth and images of the esophagus, stomach, and small bowel were obtained.     IMAGES: 125     FLUOROSCOPY TIME: 2min 43 sec     FINDINGS:     The esophagus is normal in caliber. Esophageal motility is normal and emptying of contrast from the esophagus is prompt. No mucosal abnormalities although evaluation is limited with single contrast technique.     The patient is status post gastric bypass. Appearance is unremarkable.     Contrast empties promptly through the gastrojejunostomy into normal-appearing proximal small bowel loops.     Gastroesophageal reflux was observed to the thoracic inlet.     There is a moderate hiatal hernia containing the gastric pouch and gastrojejunostomy.     IMPRESSION:     Hiatal hernia containing gastric pouch and gastrojejunostomy with gastroesophageal reflux.  BARIATRIC HISTORY AND PHYSICAL - BARIATRIC SURGERY  Tigist Dave 69 y.o. female MRN: [id number]  Unit/Bed#:  Encounter: [id number]      HPI:  Tigist Dave is a 69 y.o. female who presents to review their preoperative workup and see if they are a good candidate to undergo a bariatric and/or foregut procedure.     Review of Systems   Constitutional:  Negative for chills and fever.   HENT:  Negative for sore throat.    Eyes:  Negative for visual disturbance.   Respiratory:  Negative for cough and shortness of breath.    Gastrointestinal:  Negative for vomiting.        Dysphagia   Musculoskeletal:  Negative for arthralgias.   Skin:  Negative for color change  "and rash.   Neurological:  Negative for seizures and syncope.   All other systems reviewed and are negative.      Historical Information   Past Medical History:   Diagnosis Date    Asthma     had since childhood    Colon polyp     COPD (chronic obstructive pulmonary disease) ([facility])     usually have a bout every winter    Crohn's disease (HCC)     GERD (gastroesophageal reflux disease) 2016    take an otc tablet at night     Past Surgical History:   Procedure Laterality Date    COLONOSCOPY      ESOPHAGEAL MANOMETRY      GASTRIC BYPASS LAPAROSCOPIC      HYSTERECTOMY Bilateral     35 years old    OOPHORECTOMY Bilateral     35 years old    TONSILLECTOMY       Social History   Social History     Substance and Sexual Activity   Alcohol Use Not Currently     Social History     Substance and Sexual Activity   Drug Use Never     Social History     Tobacco Use   Smoking Status Never   Smokeless Tobacco Never     Family History: Family history non-contributory    Meds/Allergies   all medications and allergies reviewed  Allergies   Allergen Reactions    Biaxin [Clarithromycin] Rash       Objective     Current Vitals:   Blood Pressure: 128/74 (01/30/25 1301)  Pulse: 84 (01/30/25 1301)  Temperature: 97.8 °F (36.6 °C) (01/30/25 1301)  Temp Source: Tympanic (01/30/25 1301)  Height: 5' 1\" (154.9 cm) (01/30/25 1301)  Weight - Scale: 88.9 kg (196 lb) (01/30/25 1301)  SpO2: 99 % (01/30/25 1301)  bowel movement  [unfilled]    Invasive Devices       None                   Physical Exam  Vitals reviewed.   Constitutional:       Appearance: Normal appearance.   HENT:      Head: Atraumatic.      Nose: Nose normal.   Cardiovascular:      Rate and Rhythm: Normal rate.      Pulses: Normal pulses.   Pulmonary:      Effort: Pulmonary effort is normal.      Breath sounds: Normal breath sounds.   Abdominal:      Comments: Small left sided incision from prior bypass.   Musculoskeletal:         General: Normal range of motion.   Neurological:      " General: No focal deficit present.   Psychiatric:         Behavior: Behavior normal.         Lab Results: I have personally reviewed pertinent lab results.    Imaging: Results Review Statement: I personally reviewed the following image studies/reports in PACS and discussed pertinent findings with Radiology:  . My interpretation of the radiology images/reports is:  .  EKG, Pathology, and Other Studies: Results Review Statement: I personally reviewed the following image studies/reports in PACS and discussed pertinent findings with Radiology:  . My interpretation of the radiology images/reports is:  .    Code Status: [unfilled]  Advance Directive and Living Will:      Power of :    POLST:        Assessment/Plan:    The patient presented to review the preoperative workup and see if bariatric surgery is appropriate and indicated following the extensive preoperative workup and the enrollment in our weight loss program.  Preoperative workup was complete. Results were reviewed with the patient including the blood work results and the endoscopy findings and the biopsy results. We also reviewed the cardiology evaluation.    The patient was determined to be a good candidate for REPAIR HERNIA PARAESOPHAGEAL LAPAROSCOPIC W ROBOTICS    Patient is s/p laparoscopic Doug-en-Y gastric bypass by Dr. Cuba at [facility] with large hiatal hernia and intrathoracic gastric pouch.  I reviewed today all her diagnostic studies including upper GI and the upper endoscopy and reviewed the operative plan with her and her family members and decided to proceed with a hiatal hernia repair.  All questions were answered all concerns were addressed   ----------------------------------------------------------------------  Smoking: No  Blood thinner use: No  Home pain medication use and who manages it: No  CPAP use: No (If yes, sleep study obtained and reminded pt to bring CPAP to hospital)  History of blood clots: No  Previous  abdominal surgeries: hx of bypass (Dr. Ching, 2008, hysterectomy)  Cardiac clearance obtained: Yes   EKG performed: No cardiac contraindications to planned surgery   EGD prior to surgery: Yes  Screening Labs obtained (CBC, CMP): Yes  H. Pylori status: not performed  Medication allergies: Biaxin [clarithromycin]   SLIM consult Post-Op:   Reminded patient about 2 week pre-op liquid diet: Yes  10% body weight loss pre-operatively met/discussed: NA  Consent signed: yes  Pre-Op ERAS Orders placed: yes  -----------------------------------------------------------------------  Risks and benefits explained one more time to the patient. Alternatives to surgery and alternative forms of surgery were also explained. Post-surgical commitment and after care programs were explained.  Consent was signed. Questions were answered and concerns were addressed. Patient will need to start the 2 week liquid diet prior to surgery.  Patient wishes to proceed. As per [facility] guidelines, I had a discussion with the patient regarding their CODE STATUS in the perioperative period and the patient is level 1 or FULL CODE STATUS.    Jus Rajan MD  Bariatric Surgery   1/30/2025  1:14 PM

## 2025-01-30 NOTE — H&P (VIEW-ONLY)
[facility]  [address] [address]  [phone]        DATE OF SERVICE:  12/18/24     PHYSICIAN(S):  Attending:   Papa Jiménez MD      Fellow:   No Staff Documented         INDICATION:  Bariatric surgery status     POST-OP DIAGNOSIS:  See the impression below.     PREPROCEDURE:  Informed consent was obtained for the procedure, including sedation.  Risks of perforation, hemorrhage, adverse drug reaction and aspiration were discussed. The patient was placed in the left lateral decubitus position.     Patient was explained about the risks and benefits of the procedure. Risks including but not limited to bleeding, infection, and perforation were explained in detail. Also explained about less than 100% sensitivity with the exam and other alternatives.     PROCEDURE: EGD     DETAILS OF PROCEDURE:   Patient was taken to the procedure room where a time out was performed to confirm correct patient and correct procedure. The patient underwent monitored anesthesia care, which was administered by an anesthesia professional. The patient's blood pressure, heart rate, level of consciousness, respirations, oxygen, ECG and ETCO2 were monitored throughout the procedure. The scope was introduced through the mouth and advanced to the second part of the duodenum. Retroflexion was performed in the fundus. The patient experienced no blood loss. The procedure was not difficult. The patient tolerated the procedure well. There were no apparent adverse events.      ANESTHESIA INFORMATION:  ASA: II  Anesthesia Type: IV Sedation with Anesthesia     MEDICATIONS:  No administrations occurring from 1047 to 1049 on 12/18/24         FINDINGS:  The esophagus, gastric pouch and Doug limb appeared normal.  Regular Z-line 35 cm from the incisors  Previous Doug-en-Y gastric bypass in the stomach. I could not see the diaphragmatic pinch most likely because the entire gastric pouch is in the thoracic cavity  as seen on imaging studies        SPECIMENS:  * No specimens in log *        IMPRESSION:  The esophagus, gastric pouch and doug limb appeared normal.  Previous Doug-en-Y gastric bypass in the stomach        RECOMMENDATION:  There is no recommended follow-up for this procedure.         UPPER GI SERIES -SINGLE CONTRAST     INDICATION: K44.9: Diaphragmatic hernia without obstruction or gangrene.     COMPARISON: 11/9/2024, 12/14/2020     TECHNIQUE: The patient was given barium by mouth and images of the esophagus, stomach, and small bowel were obtained.     IMAGES: 125     FLUOROSCOPY TIME: 2min 43 sec     FINDINGS:     The esophagus is normal in caliber. Esophageal motility is normal and emptying of contrast from the esophagus is prompt. No mucosal abnormalities although evaluation is limited with single contrast technique.     The patient is status post gastric bypass. Appearance is unremarkable.     Contrast empties promptly through the gastrojejunostomy into normal-appearing proximal small bowel loops.     Gastroesophageal reflux was observed to the thoracic inlet.     There is a moderate hiatal hernia containing the gastric pouch and gastrojejunostomy.     IMPRESSION:     Hiatal hernia containing gastric pouch and gastrojejunostomy with gastroesophageal reflux.  BARIATRIC HISTORY AND PHYSICAL - BARIATRIC SURGERY  Tigist Dave 69 y.o. female MRN: [id number]  Unit/Bed#:  Encounter: [id number]      HPI:  Tigist Dave is a 69 y.o. female who presents to review their preoperative workup and see if they are a good candidate to undergo a bariatric and/or foregut procedure.     Review of Systems   Constitutional:  Negative for chills and fever.   HENT:  Negative for sore throat.    Eyes:  Negative for visual disturbance.   Respiratory:  Negative for cough and shortness of breath.    Gastrointestinal:  Negative for vomiting.        Dysphagia   Musculoskeletal:  Negative for arthralgias.   Skin:  Negative for color change  "and rash.   Neurological:  Negative for seizures and syncope.   All other systems reviewed and are negative.      Historical Information   Past Medical History:   Diagnosis Date    Asthma     had since childhood    Colon polyp     COPD (chronic obstructive pulmonary disease) ([facility])     usually have a bout every winter    Crohn's disease (HCC)     GERD (gastroesophageal reflux disease) 2016    take an otc tablet at night     Past Surgical History:   Procedure Laterality Date    COLONOSCOPY      ESOPHAGEAL MANOMETRY      GASTRIC BYPASS LAPAROSCOPIC      HYSTERECTOMY Bilateral     35 years old    OOPHORECTOMY Bilateral     35 years old    TONSILLECTOMY       Social History   Social History     Substance and Sexual Activity   Alcohol Use Not Currently     Social History     Substance and Sexual Activity   Drug Use Never     Social History     Tobacco Use   Smoking Status Never   Smokeless Tobacco Never     Family History: Family history non-contributory    Meds/Allergies   all medications and allergies reviewed  Allergies   Allergen Reactions    Biaxin [Clarithromycin] Rash       Objective     Current Vitals:   Blood Pressure: 128/74 (01/30/25 1301)  Pulse: 84 (01/30/25 1301)  Temperature: 97.8 °F (36.6 °C) (01/30/25 1301)  Temp Source: Tympanic (01/30/25 1301)  Height: 5' 1\" (154.9 cm) (01/30/25 1301)  Weight - Scale: 88.9 kg (196 lb) (01/30/25 1301)  SpO2: 99 % (01/30/25 1301)  bowel movement  [unfilled]    Invasive Devices       None                   Physical Exam  Vitals reviewed.   Constitutional:       Appearance: Normal appearance.   HENT:      Head: Atraumatic.      Nose: Nose normal.   Cardiovascular:      Rate and Rhythm: Normal rate.      Pulses: Normal pulses.   Pulmonary:      Effort: Pulmonary effort is normal.      Breath sounds: Normal breath sounds.   Abdominal:      Comments: Small left sided incision from prior bypass.   Musculoskeletal:         General: Normal range of motion.   Neurological:      " General: No focal deficit present.   Psychiatric:         Behavior: Behavior normal.         Lab Results: I have personally reviewed pertinent lab results.    Imaging: Results Review Statement: I personally reviewed the following image studies/reports in PACS and discussed pertinent findings with Radiology:  . My interpretation of the radiology images/reports is:  .  EKG, Pathology, and Other Studies: Results Review Statement: I personally reviewed the following image studies/reports in PACS and discussed pertinent findings with Radiology:  . My interpretation of the radiology images/reports is:  .    Code Status: [unfilled]  Advance Directive and Living Will:      Power of :    POLST:        Assessment/Plan:    The patient presented to review the preoperative workup and see if bariatric surgery is appropriate and indicated following the extensive preoperative workup and the enrollment in our weight loss program.  Preoperative workup was complete. Results were reviewed with the patient including the blood work results and the endoscopy findings and the biopsy results. We also reviewed the cardiology evaluation.    The patient was determined to be a good candidate for REPAIR HERNIA PARAESOPHAGEAL LAPAROSCOPIC W ROBOTICS    Patient is s/p laparoscopic Doug-en-Y gastric bypass by Dr. Cuba at [facility] with large hiatal hernia and intrathoracic gastric pouch.  I reviewed today all her diagnostic studies including upper GI and the upper endoscopy and reviewed the operative plan with her and her family members and decided to proceed with a hiatal hernia repair.  All questions were answered all concerns were addressed   ----------------------------------------------------------------------  Smoking: No  Blood thinner use: No  Home pain medication use and who manages it: No  CPAP use: No (If yes, sleep study obtained and reminded pt to bring CPAP to hospital)  History of blood clots: No  Previous  abdominal surgeries: hx of bypass (Dr. hCing, 2008, hysterectomy)  Cardiac clearance obtained: Yes   EKG performed: No cardiac contraindications to planned surgery   EGD prior to surgery: Yes  Screening Labs obtained (CBC, CMP): Yes  H. Pylori status: not performed  Medication allergies: Biaxin [clarithromycin]   SLIM consult Post-Op:   Reminded patient about 2 week pre-op liquid diet: Yes  10% body weight loss pre-operatively met/discussed: NA  Consent signed: yes  Pre-Op ERAS Orders placed: yes  -----------------------------------------------------------------------  Risks and benefits explained one more time to the patient. Alternatives to surgery and alternative forms of surgery were also explained. Post-surgical commitment and after care programs were explained.  Consent was signed. Questions were answered and concerns were addressed. Patient will need to start the 2 week liquid diet prior to surgery.  Patient wishes to proceed. As per [facility] guidelines, I had a discussion with the patient regarding their CODE STATUS in the perioperative period and the patient is level 1 or FULL CODE STATUS.    Jus Rajan MD  Bariatric Surgery   1/30/2025  1:14 PM

## 2025-01-31 DIAGNOSIS — K44.9 HIATAL HERNIA: Primary | ICD-10-CM

## 2025-01-31 RX ORDER — ASPIRIN 325 MG
TABLET ORAL DAILY
COMMUNITY

## 2025-01-31 NOTE — PRE-PROCEDURE INSTRUCTIONS
Pre-Surgery Instructions:   Medication Instructions    Coenzyme Q10 (CO Q 10 PO) Stop taking 3 days prior to surgery.    dicyclomine (BENTYL) 10 mg capsule Uses PRN- OK to take day of surgery    Multiple Vitamins-Iron (One Daily Multivitamin/Iron) TABS Hold day of surgery.    Omega-3 Fatty Acids (FISH OIL PO) Stop taking 3 days prior to surgery.    omeprazole (PriLOSEC) 20 mg delayed release capsule Take day of surgery.    rosuvastatin (CRESTOR) 10 MG tablet Take night before surgery   Medication instructions for day surgery reviewed. Please use only a sip of water to take your instructed medications. Avoid all over the counter vitamins, supplements and NSAIDS for one week prior to surgery per anesthesia guidelines. Tylenol is ok to take as needed.     You will receive a call one business day prior to surgery with an arrival time and hospital directions. If your surgery is scheduled on a Monday, the hospital will be calling you on the Friday prior to your surgery. If you have not heard from anyone by 8pm, please call the hospital supervisor through the hospital  at [phone]. ([place] [phone] or [place] [phone]).    Do not eat or drink anything after midnight the night before your surgery, including candy, mints, lifesavers, or chewing gum. Do not drink alcohol 24hrs before your surgery. Try not to smoke at least 24hrs before your surgery.       Follow the pre surgery showering instructions as listed in the “My Surgical Experience Booklet” or otherwise provided by your surgeon's office. Do not use a blade to shave the surgical area 1 week before surgery. It is okay to use a clean electric clippers up to 24 hours before surgery. Do not apply any lotions, creams, including makeup, cologne, deodorant, or perfumes after showering on the day of your surgery. Do not use dry shampoo, hair spray, hair gel, or any type of hair products.     No contact lenses, eye make-up, or artificial eyelashes.  Remove nail polish, including gel polish, and any artificial, gel, or acrylic nails if possible. Remove all jewelry including rings and body piercing jewelry.     Wear causal clothing that is easy to take on and off. Consider your type of surgery.    Keep any valuables, jewelry, piercings at home. Please bring any specially ordered equipment (sling, braces) if indicated.    Arrange for a responsible person to drive you to and from the hospital on the day of your surgery. Please confirm the visitor policy for the day of your procedure when you receive your phone call with an arrival time.     Call the surgeon's office with any new illnesses, exposures, or additional questions prior to surgery.    Please reference your “My Surgical Experience Booklet” for additional information to prepare for your upcoming surgery.     Aware of 3 Ensure drinks pre op.  Verbalized understanding of instructions.

## 2025-01-31 NOTE — TELEPHONE ENCOUNTER
PO Meds for  Laparoscopic Repair Hernia  Paraesophageal  Laparoscopic  W/ [employer] .  With dr RICHARD Jiménez  on 2/04/2025   Allergies *   Biaxin [clarithromycin] .

## 2025-02-04 ENCOUNTER — HOSPITAL ENCOUNTER (OUTPATIENT)
Facility: HOSPITAL | Age: 70
Setting detail: OUTPATIENT SURGERY
Discharge: HOME | End: 2025-02-05
Attending: SURGERY | Admitting: SURGERY
Payer: COMMERCIAL

## 2025-02-04 DIAGNOSIS — R11.10 VOMITING: ICD-10-CM

## 2025-02-04 DIAGNOSIS — R13.10 PROBLEMS WITH SWALLOWING: ICD-10-CM

## 2025-02-04 DIAGNOSIS — R12 HEARTBURN: ICD-10-CM

## 2025-02-04 DIAGNOSIS — Z98.84 S/P BARIATRIC SURGERY: ICD-10-CM

## 2025-02-04 DIAGNOSIS — K21.9 ESOPHAGEAL REFLUX: ICD-10-CM

## 2025-02-04 DIAGNOSIS — K44.9 HIATAL HERNIA: ICD-10-CM

## 2025-02-04 PROCEDURE — 43282 LAP PARAESOPH HER RPR W/MESH: CPT | Performed by: STUDENT IN AN ORGANIZED HEALTH CARE EDUCATION/TRAINING PROGRAM

## 2025-02-04 PROCEDURE — 88302 TISSUE EXAM BY PATHOLOGIST: CPT | Performed by: STUDENT IN AN ORGANIZED HEALTH CARE EDUCATION/TRAINING PROGRAM

## 2025-02-04 PROCEDURE — C1781 MESH (IMPLANTABLE): HCPCS | Performed by: SURGERY

## 2025-02-04 PROCEDURE — 43282 LAP PARAESOPH HER RPR W/MESH: CPT | Performed by: SURGERY

## 2025-02-04 PROCEDURE — S2900 ROBOTIC SURGICAL SYSTEM: HCPCS | Performed by: SURGERY

## 2025-02-04 DEVICE — ENFORM PREPERITONEAL 10CMX10CM BIOMATERIAL
Type: IMPLANTABLE DEVICE | Status: FUNCTIONAL
Brand: GORE ENFORM PREPERITONEAL BIOMATERIAL

## 2025-02-04 RX ORDER — PROPOFOL 10 MG/ML
INJECTION, EMULSION INTRAVENOUS AS NEEDED
Status: DISCONTINUED | OUTPATIENT
Start: 2025-02-04 | End: 2025-02-04

## 2025-02-04 RX ORDER — DIPHENHYDRAMINE HCL 25 MG
25 TABLET ORAL EVERY 8 HOURS PRN
Status: DISCONTINUED | OUTPATIENT
Start: 2025-02-04 | End: 2025-02-05 | Stop reason: HOSPADM

## 2025-02-04 RX ORDER — LIDOCAINE HYDROCHLORIDE 10 MG/ML
INJECTION, SOLUTION EPIDURAL; INFILTRATION; INTRACAUDAL; PERINEURAL AS NEEDED
Status: DISCONTINUED | OUTPATIENT
Start: 2025-02-04 | End: 2025-02-04

## 2025-02-04 RX ORDER — ONDANSETRON 2 MG/ML
4 INJECTION INTRAMUSCULAR; INTRAVENOUS EVERY 6 HOURS PRN
Status: DISCONTINUED | OUTPATIENT
Start: 2025-02-04 | End: 2025-02-05 | Stop reason: HOSPADM

## 2025-02-04 RX ORDER — DEXAMETHASONE SODIUM PHOSPHATE 10 MG/ML
INJECTION, SOLUTION INTRAMUSCULAR; INTRAVENOUS AS NEEDED
Status: DISCONTINUED | OUTPATIENT
Start: 2025-02-04 | End: 2025-02-04

## 2025-02-04 RX ORDER — SODIUM CHLORIDE, SODIUM LACTATE, POTASSIUM CHLORIDE, CALCIUM CHLORIDE 600; 310; 30; 20 MG/100ML; MG/100ML; MG/100ML; MG/100ML
125 INJECTION, SOLUTION INTRAVENOUS CONTINUOUS
Status: DISCONTINUED | OUTPATIENT
Start: 2025-02-04 | End: 2025-02-05 | Stop reason: HOSPADM

## 2025-02-04 RX ORDER — ACETAMINOPHEN 10 MG/ML
1000 INJECTION, SOLUTION INTRAVENOUS EVERY 6 HOURS SCHEDULED
Status: DISCONTINUED | OUTPATIENT
Start: 2025-02-04 | End: 2025-02-05 | Stop reason: HOSPADM

## 2025-02-04 RX ORDER — HEPARIN SODIUM 5000 [USP'U]/ML
5000 INJECTION, SOLUTION INTRAVENOUS; SUBCUTANEOUS ONCE
Status: COMPLETED | OUTPATIENT
Start: 2025-02-04 | End: 2025-02-04

## 2025-02-04 RX ORDER — ONDANSETRON 2 MG/ML
INJECTION INTRAMUSCULAR; INTRAVENOUS AS NEEDED
Status: DISCONTINUED | OUTPATIENT
Start: 2025-02-04 | End: 2025-02-04

## 2025-02-04 RX ORDER — ACETAMINOPHEN 10 MG/ML
1000 INJECTION, SOLUTION INTRAVENOUS ONCE
Status: COMPLETED | OUTPATIENT
Start: 2025-02-04 | End: 2025-02-04

## 2025-02-04 RX ORDER — GLYCOPYRROLATE 0.2 MG/ML
INJECTION INTRAMUSCULAR; INTRAVENOUS AS NEEDED
Status: DISCONTINUED | OUTPATIENT
Start: 2025-02-04 | End: 2025-02-04

## 2025-02-04 RX ORDER — HYDROMORPHONE HCL/PF 1 MG/ML
0.5 SYRINGE (ML) INJECTION
Status: DISCONTINUED | OUTPATIENT
Start: 2025-02-04 | End: 2025-02-04 | Stop reason: HOSPADM

## 2025-02-04 RX ORDER — PROMETHAZINE HYDROCHLORIDE 25 MG/ML
25 INJECTION, SOLUTION INTRAMUSCULAR; INTRAVENOUS EVERY 6 HOURS PRN
Status: DISCONTINUED | OUTPATIENT
Start: 2025-02-04 | End: 2025-02-05 | Stop reason: HOSPADM

## 2025-02-04 RX ORDER — BUPIVACAINE HYDROCHLORIDE 5 MG/ML
INJECTION, SOLUTION EPIDURAL; INTRACAUDAL AS NEEDED
Status: DISCONTINUED | OUTPATIENT
Start: 2025-02-04 | End: 2025-02-04 | Stop reason: HOSPADM

## 2025-02-04 RX ORDER — SIMETHICONE 80 MG
80 TABLET,CHEWABLE ORAL 4 TIMES DAILY PRN
Status: DISCONTINUED | OUTPATIENT
Start: 2025-02-04 | End: 2025-02-05 | Stop reason: HOSPADM

## 2025-02-04 RX ORDER — MAGNESIUM HYDROXIDE 1200 MG/15ML
LIQUID ORAL AS NEEDED
Status: DISCONTINUED | OUTPATIENT
Start: 2025-02-04 | End: 2025-02-04 | Stop reason: HOSPADM

## 2025-02-04 RX ORDER — SODIUM CHLORIDE, SODIUM LACTATE, POTASSIUM CHLORIDE, CALCIUM CHLORIDE 600; 310; 30; 20 MG/100ML; MG/100ML; MG/100ML; MG/100ML
100 INJECTION, SOLUTION INTRAVENOUS CONTINUOUS
Status: DISCONTINUED | OUTPATIENT
Start: 2025-02-04 | End: 2025-02-05 | Stop reason: HOSPADM

## 2025-02-04 RX ORDER — ONDANSETRON 2 MG/ML
4 INJECTION INTRAMUSCULAR; INTRAVENOUS ONCE AS NEEDED
Status: DISCONTINUED | OUTPATIENT
Start: 2025-02-04 | End: 2025-02-04 | Stop reason: HOSPADM

## 2025-02-04 RX ORDER — FENTANYL CITRATE 50 UG/ML
INJECTION, SOLUTION INTRAMUSCULAR; INTRAVENOUS AS NEEDED
Status: DISCONTINUED | OUTPATIENT
Start: 2025-02-04 | End: 2025-02-04

## 2025-02-04 RX ORDER — MORPHINE SULFATE 4 MG/ML
4 INJECTION, SOLUTION INTRAMUSCULAR; INTRAVENOUS EVERY 4 HOURS PRN
Status: DISCONTINUED | OUTPATIENT
Start: 2025-02-04 | End: 2025-02-05 | Stop reason: HOSPADM

## 2025-02-04 RX ORDER — OXYCODONE HCL 5 MG/5 ML
5 SOLUTION, ORAL ORAL EVERY 4 HOURS PRN
Status: DISCONTINUED | OUTPATIENT
Start: 2025-02-04 | End: 2025-02-05 | Stop reason: HOSPADM

## 2025-02-04 RX ORDER — MIDAZOLAM HYDROCHLORIDE 2 MG/2ML
INJECTION, SOLUTION INTRAMUSCULAR; INTRAVENOUS AS NEEDED
Status: DISCONTINUED | OUTPATIENT
Start: 2025-02-04 | End: 2025-02-04

## 2025-02-04 RX ORDER — INDOCYANINE GREEN AND WATER 25 MG
KIT INJECTION AS NEEDED
Status: DISCONTINUED | OUTPATIENT
Start: 2025-02-04 | End: 2025-02-04

## 2025-02-04 RX ORDER — VECURONIUM BROMIDE 1 MG/ML
INJECTION, POWDER, LYOPHILIZED, FOR SOLUTION INTRAVENOUS AS NEEDED
Status: DISCONTINUED | OUTPATIENT
Start: 2025-02-04 | End: 2025-02-04

## 2025-02-04 RX ORDER — EPHEDRINE SULFATE 50 MG/ML
INJECTION INTRAVENOUS AS NEEDED
Status: DISCONTINUED | OUTPATIENT
Start: 2025-02-04 | End: 2025-02-04

## 2025-02-04 RX ORDER — OXYCODONE HCL 5 MG/5 ML
10 SOLUTION, ORAL ORAL EVERY 4 HOURS PRN
Status: DISCONTINUED | OUTPATIENT
Start: 2025-02-04 | End: 2025-02-05 | Stop reason: HOSPADM

## 2025-02-04 RX ORDER — SODIUM CHLORIDE 9 MG/ML
INJECTION INTRAVENOUS AS NEEDED
Status: DISCONTINUED | OUTPATIENT
Start: 2025-02-04 | End: 2025-02-04 | Stop reason: HOSPADM

## 2025-02-04 RX ORDER — CELECOXIB 200 MG/1
200 CAPSULE ORAL ONCE
Status: COMPLETED | OUTPATIENT
Start: 2025-02-04 | End: 2025-02-04

## 2025-02-04 RX ORDER — DEXMEDETOMIDINE HYDROCHLORIDE 100 UG/ML
INJECTION, SOLUTION INTRAVENOUS AS NEEDED
Status: DISCONTINUED | OUTPATIENT
Start: 2025-02-04 | End: 2025-02-04

## 2025-02-04 RX ORDER — FAMOTIDINE 10 MG/ML
20 INJECTION, SOLUTION INTRAVENOUS 2 TIMES DAILY
Status: DISCONTINUED | OUTPATIENT
Start: 2025-02-04 | End: 2025-02-05 | Stop reason: HOSPADM

## 2025-02-04 RX ORDER — FENTANYL CITRATE/PF 50 MCG/ML
25 SYRINGE (ML) INJECTION
Status: DISCONTINUED | OUTPATIENT
Start: 2025-02-04 | End: 2025-02-04 | Stop reason: HOSPADM

## 2025-02-04 RX ORDER — CEFAZOLIN SODIUM 2 G/50ML
2000 SOLUTION INTRAVENOUS ONCE
Status: COMPLETED | OUTPATIENT
Start: 2025-02-04 | End: 2025-02-04

## 2025-02-04 RX ORDER — PROPOFOL 10 MG/ML
INJECTION, EMULSION INTRAVENOUS CONTINUOUS PRN
Status: DISCONTINUED | OUTPATIENT
Start: 2025-02-04 | End: 2025-02-04

## 2025-02-04 RX ADMIN — PROPOFOL 50 MCG/KG/MIN: 10 INJECTION, EMULSION INTRAVENOUS at 11:38

## 2025-02-04 RX ADMIN — SIMETHICONE 80 MG: 80 TABLET, CHEWABLE ORAL at 20:13

## 2025-02-04 RX ADMIN — PROPOFOL 110 MG: 10 INJECTION, EMULSION INTRAVENOUS at 11:38

## 2025-02-04 RX ADMIN — CEFAZOLIN SODIUM 2000 MG: 2 SOLUTION INTRAVENOUS at 11:35

## 2025-02-04 RX ADMIN — FENTANYL CITRATE 100 MCG: 50 INJECTION INTRAMUSCULAR; INTRAVENOUS at 11:38

## 2025-02-04 RX ADMIN — CELECOXIB 200 MG: 200 CAPSULE ORAL at 09:17

## 2025-02-04 RX ADMIN — INDOCYANINE GREEN AND WATER 12.5 MG: KIT at 13:20

## 2025-02-04 RX ADMIN — ONDANSETRON 4 MG: 2 INJECTION INTRAMUSCULAR; INTRAVENOUS at 13:53

## 2025-02-04 RX ADMIN — ACETAMINOPHEN 1000 MG: 10 INJECTION INTRAVENOUS at 12:20

## 2025-02-04 RX ADMIN — FAMOTIDINE 20 MG: 10 INJECTION, SOLUTION INTRAVENOUS at 20:07

## 2025-02-04 RX ADMIN — SODIUM CHLORIDE, SODIUM LACTATE, POTASSIUM CHLORIDE, AND CALCIUM CHLORIDE: .6; .31; .03; .02 INJECTION, SOLUTION INTRAVENOUS at 11:36

## 2025-02-04 RX ADMIN — GLYCOPYRROLATE 0.2 MG: 0.2 INJECTION, SOLUTION INTRAMUSCULAR; INTRAVENOUS at 11:38

## 2025-02-04 RX ADMIN — VECURONIUM BROMIDE 6 MG: 1 INJECTION, POWDER, LYOPHILIZED, FOR SOLUTION INTRAVENOUS at 11:38

## 2025-02-04 RX ADMIN — PROPOFOL 40 MG: 10 INJECTION, EMULSION INTRAVENOUS at 13:53

## 2025-02-04 RX ADMIN — MIDAZOLAM 2 MG: 1 INJECTION INTRAMUSCULAR; INTRAVENOUS at 11:34

## 2025-02-04 RX ADMIN — SODIUM CHLORIDE, SODIUM LACTATE, POTASSIUM CHLORIDE, AND CALCIUM CHLORIDE 100 ML/HR: .6; .31; .03; .02 INJECTION, SOLUTION INTRAVENOUS at 17:22

## 2025-02-04 RX ADMIN — INDOCYANINE GREEN AND WATER 12.5 MG: KIT at 13:40

## 2025-02-04 RX ADMIN — SODIUM CHLORIDE, SODIUM LACTATE, POTASSIUM CHLORIDE, AND CALCIUM CHLORIDE: .6; .31; .03; .02 INJECTION, SOLUTION INTRAVENOUS at 12:18

## 2025-02-04 RX ADMIN — LIDOCAINE HYDROCHLORIDE 100 MG: 10 INJECTION, SOLUTION EPIDURAL; INFILTRATION; INTRACAUDAL; PERINEURAL at 11:38

## 2025-02-04 RX ADMIN — ONDANSETRON 4 MG: 2 INJECTION INTRAMUSCULAR; INTRAVENOUS at 11:52

## 2025-02-04 RX ADMIN — DEXMEDETOMIDINE HYDROCHLORIDE 20 MCG: 100 INJECTION, SOLUTION INTRAVENOUS at 12:13

## 2025-02-04 RX ADMIN — FOSAPREPITANT DIMEGLUMINE 150 MG: 150 INJECTION, POWDER, LYOPHILIZED, FOR SOLUTION INTRAVENOUS at 09:32

## 2025-02-04 RX ADMIN — EPHEDRINE SULFATE 5 MG: 50 INJECTION INTRAVENOUS at 12:07

## 2025-02-04 RX ADMIN — HEPARIN SODIUM 5000 UNITS: 5000 INJECTION INTRAVENOUS; SUBCUTANEOUS at 09:39

## 2025-02-04 RX ADMIN — SUGAMMADEX 200 MG: 100 INJECTION, SOLUTION INTRAVENOUS at 13:53

## 2025-02-04 RX ADMIN — VECURONIUM BROMIDE 2 MG: 1 INJECTION, POWDER, LYOPHILIZED, FOR SOLUTION INTRAVENOUS at 12:44

## 2025-02-04 RX ADMIN — ACETAMINOPHEN 1000 MG: 10 INJECTION INTRAVENOUS at 17:21

## 2025-02-04 RX ADMIN — DEXAMETHASONE SODIUM PHOSPHATE 10 MG: 10 INJECTION INTRAMUSCULAR; INTRAVENOUS at 11:52

## 2025-02-04 NOTE — PLAN OF CARE
Problem: PAIN - ADULT  Goal: Verbalizes/displays adequate comfort level or baseline comfort level  Description: Interventions:  - Encourage patient to monitor pain and request assistance  - Assess pain using appropriate pain scale  - Administer analgesics based on type and severity of pain and evaluate response  - Implement non-pharmacological measures as appropriate and evaluate response  - Consider cultural and social influences on pain and pain management  - Notify physician/advanced practitioner if interventions unsuccessful or patient reports new pain  Outcome: Progressing     Problem: INFECTION - ADULT  Goal: Absence or prevention of progression during hospitalization  Description: INTERVENTIONS:  - Assess and monitor for signs and symptoms of infection  - Monitor lab/diagnostic results  - Monitor all insertion sites, i.e. indwelling lines, tubes, and drains  - Monitor endotracheal if appropriate and nasal secretions for changes in amount and color  - [place] appropriate cooling/warming therapies per order  - Administer medications as ordered  - Instruct and encourage patient and family to use good hand hygiene technique  - Identify and instruct in appropriate isolation precautions for identified infection/condition  Outcome: Progressing  Goal: Absence of fever/infection during neutropenic period  Description: INTERVENTIONS:  - Monitor WBC    Outcome: Progressing     Problem: SAFETY ADULT  Goal: Patient will remain free of falls  Description: INTERVENTIONS:  - Educate patient/family on patient safety including physical limitations  - Instruct patient to call for assistance with activity   - Consult OT/PT to assist with strengthening/mobility   - Keep Call bell within reach  - Keep bed low and locked with side rails adjusted as appropriate  - Keep care items and personal belongings within reach  - Initiate and maintain comfort rounds  - Make Fall Risk Sign visible to staff  - Offer Toileting every  Hours,  in advance of need  - Initiate/Maintain alarm  - Obtain necessary fall risk management equipment:   - Apply yellow socks and bracelet for high fall risk patients  - Consider moving patient to room near nurses station  Outcome: Progressing  Goal: Maintain or return to baseline ADL function  Description: INTERVENTIONS:  -  Assess patient's ability to carry out ADLs; assess patient's baseline for ADL function and identify physical deficits which impact ability to perform ADLs (bathing, care of mouth/teeth, toileting, grooming, dressing, etc.)  - Assess/evaluate cause of self-care deficits   - Assess range of motion  - Assess patient's mobility; develop plan if impaired  - Assess patient's need for assistive devices and provide as appropriate  - Encourage maximum independence but intervene and supervise when necessary  - Involve family in performance of ADLs  - Assess for home care needs following discharge   - Consider OT consult to assist with ADL evaluation and planning for discharge  - Provide patient education as appropriate  Outcome: Progressing  Goal: Maintains/Returns to pre admission functional level  Description: INTERVENTIONS:  - Perform AM-PAC 6 Click Basic Mobility/ Daily Activity assessment daily.  - Set and communicate daily mobility goal to care team and patient/family/caregiver.   - Collaborate with rehabilitation services on mobility goals if consulted  - Perform Range of Motion  times a day.  - Reposition patient every  hours.  - Dangle patient  times a day  - Stand patient  times a day  - Ambulate patient  times a day  - Out of bed to chair  times a day   - Out of bed for meals  times a day  - Out of bed for toileting  - Record patient progress and toleration of activity level   Outcome: Progressing     Problem: DISCHARGE PLANNING  Goal: Discharge to home or other facility with appropriate resources  Description: INTERVENTIONS:  - Identify barriers to discharge w/patient and caregiver  - Arrange for  needed discharge resources and transportation as appropriate  - Identify discharge learning needs (meds, wound care, etc.)  - Arrange for interpretive services to assist at discharge as needed  - Refer to Case Management Department for coordinating discharge planning if the patient needs post-hospital services based on physician/advanced practitioner order or complex needs related to functional status, cognitive ability, or social support system  Outcome: Progressing     Problem: Knowledge Deficit  Goal: Patient/family/caregiver demonstrates understanding of disease process, treatment plan, medications, and discharge instructions  Description: Complete learning assessment and assess knowledge base.  Interventions:  - Provide teaching at level of understanding  - Provide teaching via preferred learning methods  Outcome: Progressing

## 2025-02-04 NOTE — OP NOTE
OPERATIVE REPORT  PATIENT NAME: iTgist Dave    :  1955  MRN: [id number]  Pt Location: [facility] OR ROOM 08    SURGERY DATE: 2025    Surgeons and Role:     * Papa Jiménez MD - Primary     * Elvis Toure MD - Fellow    Preop Diagnosis:  Hiatal hernia [K44.9]  Esophageal reflux [K21.9]  Problems with swallowing [R13.10]  Heartburn [R12]  Vomiting [R11.10]  S/P bariatric surgery [Z98.84]    Post-Op Diagnosis Codes:     * Hiatal hernia [K44.9]     * Esophageal reflux [K21.9]     * Problems with swallowing [R13.10]     * Heartburn [R12]     * Vomiting [R11.10]     * S/P bariatric surgery [Z98.84]    Procedure(s):  REPAIR HERNIA PARAESOPHAGEAL  W ROBOTICS    Specimen(s):  ID Type Source Tests Collected by Time Destination   1 : hernia sac Tissue Other TISSUE EXAM Papa Jiménez MD 2025 [id number]        Estimated Blood Loss:   30 mL    Drains:  * No LDAs found *    Anesthesia Type:   General    Operative Indications:  Hiatal hernia [K44.9]  Esophageal reflux [K21.9]  Problems with swallowing [R13.10]  Heartburn [R12]  Vomiting [R11.10]  S/P bariatric surgery [Z98.84]      Operative Findings:  Large paraesophageal hernia status post laparoscopic Doug-en-Y gastric bypass  Intrathoracic gastric pouch      Complications:   None    Procedure and Technique:  Robotic paraesophageal hernia with Enform mesh   I was present for the entire procedure.    This procedure was very tedious and took a significant amount of time because of the nature of the hernia in the previous past surgical history    Patient Disposition:  hemodynamically stable         No qualified resident available to assist  Assistance was necessary for traction counter traction and assistance with stapling and intraop endoscopy  Description of the procedure:  The patient was brought to the operating room and placed in a supine position. The patient received a dose of IV antibiotics and a dose of subcutaneous Lovenox, prior to the procedure. The  patient was induced under general endotracheal anesthesia. The abdominal wall was prepped and draped under sterile conditions in the usual fashion. The procedure was started by obtaining access to the abdominal cavity using a Veress needle to the left side of the midline around 6 inches from the xiphoid process the abdominal cavity was insufflated with CO2 to a pressure of 15 mmHg. After that, the abdomen was entered with an 8 mm trocar using an Optiview trocar under direct visualization. At that point, two 8-mm trocar were placed on the right side of the abdominal wall, under direct visualization, and two 8- mm and 12-mm trocars were placed on the left side of the abdominal wall, also under direct visualization. The patient was then placed in a reverse Trendelenburg position. A Sammie retractor was placed through a small stab incision below the xiphoid and was used to retract the left lobe of the liver in a medial fashion. Robot was then docked.  Upon inspecting the hiatus the gastric pouch appeared to be in an intrathoracic location and the only thing that was evident at the level of the hiatus was the gastrojejunostomy anastomosis.    I started the dissection on the right side by taking the pars flaccida down all the way up to the right elena the right elena was dissected away from the hernia sac and esophageal wall, the right vagus nerve was clearly identified and kept out of harm's way.  I then turned my attention to the left elena, in order to expose left elena clearly I mobilized the gastrojejunostomy anastomosis and the gastric remnant using the vessel sealer of note the gastric pouch had herniated completely into the thoracic cavity. This part of the dissection was very tedious and took a significant amount of time because of the large hernia sac and the severe inflammatory reaction around the left elena from the gastric bypass staple line, the pouch was finally reduced and the hernia sac dissected away from  the left elena and then the plane of dissection around the left elena was connected anteriorly with my plane of dissection that was developed previously while dissecting the right elena.  The phrenoesophageal ligament was also taken down in its entirety and the hernia sac dissected anteriorly away from the esophageal wall left vagus nerve was identified and preserved.  At that point the hernia sac was completely dissected and excised. The decision was then made to repair the hernia posteriorly. Right elena and left elena were dissected away from the hernia sac and skeletonized all the way down to the confluence. Esophagus was kept out of harm's way during the dissection. Both vagi were also identified and preserved as previously mentioned.  Dissection was carried all the way up in the thoracic cavity to obtain more length of the esophagus. The dissection was completed circumferentially around the esophagus. We had at least 3 cm of the lower esophagus in an intra-abdominal location by the end of the dissection.   Hernia sac was completely dissected and excised and removed from the abdominal cavity using an Endo Catch bag, right elena and left elena were then approximated using 0 Ethibond sutures placed in an interrupted fashion.Enform mesh was trimmed to accommodate the esophagus and was placed around the esophagus and secured in place with simple 0 Ethibond suture.      We then removed the Sammie liver retractor.  All the trocars were removed under direct visualization, and the skin edges were approximated using 4-0 Monocryl in an interrupted, inverted fashion. Histoacryl was then applied to the skin incisions.      The patient was extubated and transferred to the PACU in stable condition.       SIGNATURE: Papa Jiménez MD  DATE: February 4, 2025  TIME: 1:56 PM

## 2025-02-04 NOTE — ANESTHESIA POSTPROCEDURE EVALUATION
Post-Op Assessment Note    CV Status:  Stable  Pain Score: 0    Pain management: adequate       Mental Status:  Sleepy   Hydration Status:  Stable   PONV Controlled:  None   Airway Patency:  Patent and adequate     Post Op Vitals Reviewed: Yes    No anethesia notable event occurred.    Staff: CRNA           Last Filed PACU Vitals:  Vitals Value Taken Time   Temp 97    Pulse 81    /77    Resp 13    SpO2 99

## 2025-02-04 NOTE — ANESTHESIA PREPROCEDURE EVALUATION
Procedure:  REPAIR HERNIA PARAESOPHAGEAL  W ROBOTICS (Abdomen)    Relevant Problems   ANESTHESIA (within normal limits)      CARDIO (within normal limits)   (+) Thrombosed external hemorrhoid      ENDO (within normal limits)      GI/HEPATIC  Hiatal Hernia      /RENAL (within normal limits)      GYN (within normal limits)      HEMATOLOGY (within normal limits)      NEURO/PSYCH (within normal limits)      PULMONARY (within normal limits)        Physical Exam    Airway    Mallampati score: II  TM Distance: >3 FB  Neck ROM: full     Dental   No notable dental hx     Cardiovascular  Rhythm: regular, Rate: normal, Cardiovascular exam normal    Pulmonary  Pulmonary exam normal Breath sounds clear to auscultation    Other Findings  post-pubertal.      Anesthesia Plan  ASA Score- 3     Anesthesia Type- general with ASA Monitors.         Additional Monitors:     Airway Plan: ETT.           Plan Factors-Exercise tolerance (METS): >4 METS.    Chart reviewed. EKG reviewed.  Existing labs reviewed. Patient summary reviewed.                  Induction-     Postoperative Plan- Plan for postoperative opioid use. Planned trial extubation    Perioperative Resuscitation Plan - Level 1 - Full Code.       Informed Consent- Anesthetic plan and risks discussed with patient.  I personally reviewed this patient with the CRNA. Discussed and agreed on the Anesthesia Plan with the CRNA..      NPO Status:  Vitals Value Taken Time   Date of last liquid 02/04/25 02/04/25 0915   Time of last liquid 0916 02/04/25 0915   Date of last solid 02/03/25 02/04/25 0915   Time of last solid 1800 02/04/25 0915

## 2025-02-04 NOTE — INTERVAL H&P NOTE
H&P reviewed. After examining the patient I find no changes in the patients condition since the H&P had been written.    Vitals:    02/04/25 0909   BP: 111/57   Pulse: 77   Resp: 16   Temp: 98 °F (36.7 °C)   SpO2: 98%

## 2025-02-05 ENCOUNTER — APPOINTMENT (OUTPATIENT)
Dept: RADIOLOGY | Facility: HOSPITAL | Age: 70
End: 2025-02-05
Payer: COMMERCIAL

## 2025-02-05 VITALS
SYSTOLIC BLOOD PRESSURE: 117 MMHG | DIASTOLIC BLOOD PRESSURE: 57 MMHG | TEMPERATURE: 97.7 F | RESPIRATION RATE: 17 BRPM | OXYGEN SATURATION: 91 % | BODY MASS INDEX: 36.75 KG/M2 | HEART RATE: 62 BPM | HEIGHT: 61 IN | WEIGHT: 194.67 LBS

## 2025-02-05 PROBLEM — K21.9 ESOPHAGEAL REFLUX: Status: ACTIVE | Noted: 2025-02-05

## 2025-02-05 LAB
ANION GAP SERPL CALCULATED.3IONS-SCNC: 8 MMOL/L (ref 4–13)
BUN SERPL-MCNC: 9 MG/DL (ref 5–25)
CALCIUM SERPL-MCNC: 9.3 MG/DL (ref 8.4–10.2)
CHLORIDE SERPL-SCNC: 107 MMOL/L (ref 96–108)
CO2 SERPL-SCNC: 27 MMOL/L (ref 21–32)
CREAT SERPL-MCNC: 0.48 MG/DL (ref 0.6–1.3)
ERYTHROCYTE [DISTWIDTH] IN BLOOD BY AUTOMATED COUNT: 16 % (ref 11.6–15.1)
GFR SERPL CREATININE-BSD FRML MDRD: 99 ML/MIN/1.73SQ M
GLUCOSE P FAST SERPL-MCNC: 145 MG/DL (ref 65–99)
GLUCOSE SERPL-MCNC: 145 MG/DL (ref 65–140)
HCT VFR BLD AUTO: 39.1 % (ref 34.8–46.1)
HGB BLD-MCNC: 12.2 G/DL (ref 11.5–15.4)
MCH RBC QN AUTO: 25.7 PG (ref 26.8–34.3)
MCHC RBC AUTO-ENTMCNC: 31.2 G/DL (ref 31.4–37.4)
MCV RBC AUTO: 82 FL (ref 82–98)
PLATELET # BLD AUTO: 273 THOUSANDS/UL (ref 149–390)
PMV BLD AUTO: 9.7 FL (ref 8.9–12.7)
POTASSIUM SERPL-SCNC: 4.3 MMOL/L (ref 3.5–5.3)
RBC # BLD AUTO: 4.75 MILLION/UL (ref 3.81–5.12)
SODIUM SERPL-SCNC: 142 MMOL/L (ref 135–147)
WBC # BLD AUTO: 10.15 THOUSAND/UL (ref 4.31–10.16)

## 2025-02-05 PROCEDURE — 85027 COMPLETE CBC AUTOMATED: CPT | Performed by: STUDENT IN AN ORGANIZED HEALTH CARE EDUCATION/TRAINING PROGRAM

## 2025-02-05 PROCEDURE — NC001 PR NO CHARGE: Performed by: PHYSICIAN ASSISTANT

## 2025-02-05 PROCEDURE — 80048 BASIC METABOLIC PNL TOTAL CA: CPT | Performed by: STUDENT IN AN ORGANIZED HEALTH CARE EDUCATION/TRAINING PROGRAM

## 2025-02-05 PROCEDURE — 74240 X-RAY XM UPR GI TRC 1CNTRST: CPT

## 2025-02-05 PROCEDURE — 99024 POSTOP FOLLOW-UP VISIT: CPT | Performed by: STUDENT IN AN ORGANIZED HEALTH CARE EDUCATION/TRAINING PROGRAM

## 2025-02-05 RX ADMIN — ACETAMINOPHEN 1000 MG: 10 INJECTION INTRAVENOUS at 05:12

## 2025-02-05 RX ADMIN — SODIUM CHLORIDE, SODIUM LACTATE, POTASSIUM CHLORIDE, AND CALCIUM CHLORIDE 100 ML/HR: .6; .31; .03; .02 INJECTION, SOLUTION INTRAVENOUS at 03:46

## 2025-02-05 RX ADMIN — ACETAMINOPHEN 1000 MG: 10 INJECTION INTRAVENOUS at 12:07

## 2025-02-05 RX ADMIN — ACETAMINOPHEN 1000 MG: 10 INJECTION INTRAVENOUS at 00:27

## 2025-02-05 RX ADMIN — FAMOTIDINE 20 MG: 10 INJECTION, SOLUTION INTRAVENOUS at 08:29

## 2025-02-05 RX ADMIN — IOHEXOL 100 ML: 350 INJECTION, SOLUTION INTRAVENOUS at 09:57

## 2025-02-05 NOTE — DISCHARGE SUMMARY
Discharge Summary - Tigist Dave 69 y.o. female MRN: [id number]    Unit/Bed#: E5 -01 Encounter: [id number]      Pre-Operative Diagnosis: Pre-Op Diagnosis Codes:      * Hiatal hernia [K44.9]     * Esophageal reflux [K21.9]     * Problems with swallowing [R13.10]     * Heartburn [R12]     * Vomiting [R11.10]     * S/P bariatric surgery [Z98.84]    Post-Operative Diagnosis: Post-Op Diagnosis Codes:     * Hiatal hernia [K44.9]     * Esophageal reflux [K21.9]     * Problems with swallowing [R13.10]     * Heartburn [R12]     * Vomiting [R11.10]     * S/P bariatric surgery [Z98.84]    Procedures Performed:  Procedure(s):  REPAIR HERNIA PARAESOPHAGEAL  W ROBOTICS    Surgeon: Papa Jiménez MD    See H & P for full details of admission and Operative Note for full details of operations performed.     Patient tolerated surgery well without complications. In the morning postoperative Day 1, the patient had mild nausea and abdominal pain. UGI obtained and normal in findings. Tolerated a clear liquid diet without vomiting. Able to ambulate and voiding independently. Patient was deemed ready for discharge home.     Patient was seen and examined prior to discharge.      Provisions for Follow-Up Care:  See After Visit Summary/Discharge Instructions for information related to follow-up care and home orders.      Disposition: Home, in stable condition.     Planned Readmission: No    Discharge Medications:  See After Visit Summary/Discharge Instructions for reconciled discharge medications provided to patient and family.      Post Operative instructions: Reviewed with patient and/or family.    Signature:   Katelyn Borjas PA-C  Date: 2/5/2025 Time: 12:12 PM

## 2025-02-05 NOTE — PLAN OF CARE
Problem: PAIN - ADULT  Goal: Verbalizes/displays adequate comfort level or baseline comfort level  Description: Interventions:  - Encourage patient to monitor pain and request assistance  - Assess pain using appropriate pain scale  - Administer analgesics based on type and severity of pain and evaluate response  - Implement non-pharmacological measures as appropriate and evaluate response  - Consider cultural and social influences on pain and pain management  - Notify physician/advanced practitioner if interventions unsuccessful or patient reports new pain  Outcome: Progressing     Problem: INFECTION - ADULT  Goal: Absence or prevention of progression during hospitalization  Description: INTERVENTIONS:  - Assess and monitor for signs and symptoms of infection  - Monitor lab/diagnostic results  - Monitor all insertion sites, i.e. indwelling lines, tubes, and drains  - Monitor endotracheal if appropriate and nasal secretions for changes in amount and color  - [place] appropriate cooling/warming therapies per order  - Administer medications as ordered  - Instruct and encourage patient and family to use good hand hygiene technique  - Identify and instruct in appropriate isolation precautions for identified infection/condition  Outcome: Progressing  Goal: Absence of fever/infection during neutropenic period  Description: INTERVENTIONS:  - Monitor WBC    Outcome: Progressing     Problem: SAFETY ADULT  Goal: Patient will remain free of falls  Description: INTERVENTIONS:  - Educate patient/family on patient safety including physical limitations  - Instruct patient to call for assistance with activity   - Consult OT/PT to assist with strengthening/mobility   - Keep Call bell within reach  - Keep bed low and locked with side rails adjusted as appropriate  - Keep care items and personal belongings within reach  - Initiate and maintain comfort rounds  - Make Fall Risk Sign visible to staff  - Offer Toileting every  Hours,  in advance of need  - Initiate/Maintain alarm  - Obtain necessary fall risk management equipment:   - Apply yellow socks and bracelet for high fall risk patients  - Consider moving patient to room near nurses station  Outcome: Progressing  Goal: Maintain or return to baseline ADL function  Description: INTERVENTIONS:  -  Assess patient's ability to carry out ADLs; assess patient's baseline for ADL function and identify physical deficits which impact ability to perform ADLs (bathing, care of mouth/teeth, toileting, grooming, dressing, etc.)  - Assess/evaluate cause of self-care deficits   - Assess range of motion  - Assess patient's mobility; develop plan if impaired  - Assess patient's need for assistive devices and provide as appropriate  - Encourage maximum independence but intervene and supervise when necessary  - Involve family in performance of ADLs  - Assess for home care needs following discharge   - Consider OT consult to assist with ADL evaluation and planning for discharge  - Provide patient education as appropriate  Outcome: Progressing  Goal: Maintains/Returns to pre admission functional level  Description: INTERVENTIONS:  - Perform AM-PAC 6 Click Basic Mobility/ Daily Activity assessment daily.  - Set and communicate daily mobility goal to care team and patient/family/caregiver.   - Collaborate with rehabilitation services on mobility goals if consulted  - Perform Range of Motion  times a day.  - Reposition patient every  hours.  - Dangle patient  times a day  - Stand patient  times a day  - Ambulate patient  times a day  - Out of bed to chair  times a day   - Out of bed for meals times a day  - Out of bed for toileting  - Record patient progress and toleration of activity level   Outcome: Progressing     Problem: DISCHARGE PLANNING  Goal: Discharge to home or other facility with appropriate resources  Description: INTERVENTIONS:  - Identify barriers to discharge w/patient and caregiver  - Arrange for  needed discharge resources and transportation as appropriate  - Identify discharge learning needs (meds, wound care, etc.)  - Arrange for interpretive services to assist at discharge as needed  - Refer to Case Management Department for coordinating discharge planning if the patient needs post-hospital services based on physician/advanced practitioner order or complex needs related to functional status, cognitive ability, or social support system  Outcome: Progressing     Problem: Knowledge Deficit  Goal: Patient/family/caregiver demonstrates understanding of disease process, treatment plan, medications, and discharge instructions  Description: Complete learning assessment and assess knowledge base.  Interventions:  - Provide teaching at level of understanding  - Provide teaching via preferred learning methods  Outcome: Progressing

## 2025-02-05 NOTE — PROGRESS NOTES
"Progress Note - Bariatric Surgery   Tigist Dave 69 y.o. female MRN: [id number]  Unit/Bed#: E5 -01 Encounter: [id number]      Subjective/Objective     Subjective:  Patient POD1 s/p robotic PEHR with mesh.  Patient denies fevers, chills, sweats, SOB, CP, calf pain.  Pain adequately controlled on oral pain medication.  Ambulating without assistance, voiding well, and using incentive spirometer.  Patient tolerating liquid diet without nausea or vomiting today.  Vital signs stable.  CBC today shows appropriate Hgb and WBC counts.  BMP obtained today appropriate. Upper GI ordered, patient NPO.        Objective:    /57 (BP Location: Left arm)   Pulse 62   Temp 97.7 °F (36.5 °C) (Oral)   Resp 17   Ht 5' 1\" (1.549 m)   Wt 88.3 kg (194 lb 10.7 oz)   LMP  (LMP Unknown)   SpO2 91%   BMI 36.78 kg/m²       Intake/Output Summary (Last 24 hours) at 2/5/2025 1241  Last data filed at 2/5/2025 0701  Gross per 24 hour   Intake 1600 ml   Output 1130 ml   Net 470 ml       Invasive Devices       Peripheral Intravenous Line  Duration             Peripheral IV 02/04/25 Distal;Dorsal (posterior);Right Forearm 1 day                    ROS: 10-point system completed. All negative except see HPI.    Physical Exam    General Appearance:    Alert, cooperative, no distress, appears stated age   Head:    Normocephalic, without obvious abnormality, atraumatic   Lungs:     Respirations unlabored   Heart:    Regular rate and rhythm   Abdomen:     Soft, appropriate tenderness, no masses, no organomegaly, non-distended   Extremities:   Extremities normal, atraumatic, no cyanosis or edema   Neurologic:  Incision:  Psych:   Normal strength and sensation    Clean, dry, and intact, no evidence of bleeding    Normal mood and affect       Lab, Imaging and other studies:I have personally reviewed pertinent lab results.  , CBC:   Lab Results   Component Value Date    WBC 10.15 02/05/2025    HGB 12.2 02/05/2025    HCT 39.1 02/05/2025    " MCV 82 02/05/2025     02/05/2025    RBC 4.75 02/05/2025    MCH 25.7 (L) 02/05/2025    MCHC 31.2 (L) 02/05/2025    RDW 16.0 (H) 02/05/2025    MPV 9.7 02/05/2025   , CMP:   Lab Results   Component Value Date    SODIUM 142 02/05/2025    K 4.3 02/05/2025     02/05/2025    CO2 27 02/05/2025    BUN 9 02/05/2025    CREATININE 0.48 (L) 02/05/2025    CALCIUM 9.3 02/05/2025    EGFR 99 02/05/2025        VTE Mechanical Prophylaxis: sequential compression device    Assessment/Plan  1)  Patient s/p robotic PEHR with mesh with stable post op course.  Patient afebrile and hemodynamically stable.     - Encourage PO fluids   - Upper GI pending, remain NPO  - Recommend ambulation, use of SCDs when not ambulating, and incentive spirometry.   - Plan to D/C patient home today pending anticipated progression    Plan of care was discussed with patient.  Care plan discussed with Dr. Cameron Toure MD  Bariatric Surgery  2/5/2025  12:41 PM

## 2025-02-05 NOTE — ANESTHESIA POSTPROCEDURE EVALUATION
Post-Op Assessment Note    CV Status:  Stable  Pain Score: 0    Pain management: adequate    Multimodal analgesia used between 6 hours prior to anesthesia start to PACU discharge    Mental Status:  Sleepy   Hydration Status:  Stable   PONV Controlled:  None   Airway Patency:  Patent and adequate     Post Op Vitals Reviewed: Yes    No anethesia notable event occurred.    Staff: CRNA, Anesthesiologist           Last Filed PACU Vitals:  Vitals Value Taken Time   Temp 97    Pulse 81    /77    Resp 13    SpO2 99        Modified Shruthi:     Vitals Value Taken Time   Activity 2 02/04/25 1630   Respiration 2 02/04/25 1630   Circulation 2 02/04/25 1630   Consciousness 1 02/04/25 1630   Oxygen Saturation 2 02/04/25 1630     Modified Shruthi Score: 9

## 2025-02-05 NOTE — DISCHARGE INSTR - AVS FIRST PAGE
Bariatric/Weight Loss Surgery  Hospital Discharge Instructions  ACTIVITY:  Progress as feels comfortable - a good rule is:  if you are doing something and it begins to hurt, stop doing the activity. Walk every hour while at home.  You may walk stairs if you do so slowly  You may shower 48 hours after surgery.  Do not scrub incision sites.  Blot gently with clean towel to dry incisions. (see #4 below)   Use your incentive spirometer 10 times per hour while awake for 1 week after surgery.  Do NOT drive for 48 hours after surgery. No driving 24 hours after taking certain prescription pain medications. Examples of such medication are Percocet, Darvocet, Oxycodone, Tylenol #3, and Tylenol with Codeine.     DIET  Hernia diet as instructed    MEDICATIONS:  The abdominal nerve block will wear off during the first 1-2 days that you are home, and you may become sore (especially over incision site/sites where abdominal wall is sutured). This may create a pulling sensation, especially while moving around, and will fade over time.  Continue to take your Tylenol and your pain medication as instructed.   Start vitamins and minerals one week after surgery   Anti-acid Medication as per prescription  Females: DO NOT TAKE BIRTH CONTROL(BC) MEDICATIONS, INSERT BC VAGINAL RINGS, OR PLACE IUD OR ANY OTHER BC METHODS UNTIL 31 DAYS FROM DAY OF DISCHARGE FROM HOSPITAL. THIS PLACES YOU AT HIGH RISK FOR A POTENTIALLY LIFE THREATENING BLOOD CLOT. Remember to always use barrier methods for birth control and speak to your GYN about using two forms of birth control to start 31 days after surgery. It is very important to avoid pregnancy until at least 18-24 months after surgery.     INCISION CARE  You may shower and get incisions wet 2 days after surgery. No soaking tub baths or swimming for 30 days after surgery. Keep abdominal area and incisions clean. Use soap and water to create a good lather and rinse off.  Do not scrub incisions.   If you  have a drain, empty the drain as the nurses instructed.    FOLLOW-UP APPOINTMENT should be made for one week after discharge. Call surgeon’s office at [phone] to schedule an appointment.    CALL YOUR DOCTOR FOR:  pain not controlled by pain medications, a temperature greater than 101.5° F, any increase or change in drainage or redness from any incision, any vomiting or inability to keep liquids down, shortness of breath, shoulder pain, or bleeding

## 2025-02-06 ENCOUNTER — TELEPHONE (OUTPATIENT)
Dept: MEDSURG UNIT | Facility: HOSPITAL | Age: 70
End: 2025-02-06

## 2025-02-06 PROCEDURE — 88302 TISSUE EXAM BY PATHOLOGIST: CPT | Performed by: STUDENT IN AN ORGANIZED HEALTH CARE EDUCATION/TRAINING PROGRAM

## 2025-02-06 NOTE — TELEPHONE ENCOUNTER
Post op follow up phone call completed.  Pt is sipping fluids and tolerating full liquids.  Using IS as instructed, reinforced importance of using IS to help prevent pneumonia. Ambulating about home without difficulty.  Pain controlled with analgesia, only using tylenol.  Reaffirmed examples of full liquid diet over the next week.  Passing gas, had 3 loose stools yesterday.  None today, did not start miralax.  Pt stated understanding about discharge instructions and medication adjustments.  Follow up appt with surgeon scheduled for next week.   Instructed to call with any additional questions or concerns.

## 2025-02-10 ENCOUNTER — ANESTHESIA (OUTPATIENT)
Dept: PERIOP | Facility: HOSPITAL | Age: 70
End: 2025-02-10
Payer: COMMERCIAL

## 2025-02-11 ENCOUNTER — TELEPHONE (OUTPATIENT)
Dept: BARIATRICS | Facility: CLINIC | Age: 70
End: 2025-02-11

## 2025-02-11 NOTE — TELEPHONE ENCOUNTER
Patient had REPAIR HERNIA PARAESOPHAGEAL W ROBOTICS done on 2/4/25 by Dr. Cameron Jiménez. Is coming down with a head cold and cough and is asking if she can take OTC Mucinex and Delsym cough suppressant.

## 2025-02-12 NOTE — PROGRESS NOTES
Weight Management Nutrition Class     Diagnosis: Morbid Obesity    Bariatric Surgeon: Dr. Papa Jiménez    Surgery:  PEHR    Class: first post op note    Topics discussed today include:     Reviewed post-operative PEHR diet progression, full liquid diet for 7-10 days, then soft diet for 4-8 weeks.  Discussed soft diet choices, eating soft, moist foods, avoiding dry tough foods, using sauces or gravies to moisten foods, sipping liquids with meals to facilitate swallowing, cutting food into small bites or eating minced or ground meats, chewing well, eating slowly, remaining upright for 45-60 minutes after eating, do not eat for 3 hour prior to laying down for bed, eat six small meals per day.  Discussed avoiding acidic foods, very hot and very cold foods and liquids, and discussed ways to minimize gas and bloating and gas-producing foods to eliminate.  Patient was able to verbalize basic diet (protein, fluid, vitamin and mineral) recommendations and possible nutrition-related complications. Yes

## 2025-02-13 ENCOUNTER — CLINICAL SUPPORT (OUTPATIENT)
Dept: BARIATRICS | Facility: CLINIC | Age: 70
End: 2025-02-13

## 2025-02-13 ENCOUNTER — OFFICE VISIT (OUTPATIENT)
Dept: BARIATRICS | Facility: CLINIC | Age: 70
End: 2025-02-13

## 2025-02-13 VITALS
BODY MASS INDEX: 33.99 KG/M2 | DIASTOLIC BLOOD PRESSURE: 70 MMHG | HEIGHT: 61 IN | TEMPERATURE: 97.8 F | HEART RATE: 79 BPM | SYSTOLIC BLOOD PRESSURE: 110 MMHG | WEIGHT: 180 LBS

## 2025-02-13 DIAGNOSIS — Z98.84 S/P GASTRIC BYPASS: ICD-10-CM

## 2025-02-13 DIAGNOSIS — E66.812 OBESITY, CLASS II, BMI 35-39.9: Primary | ICD-10-CM

## 2025-02-13 DIAGNOSIS — Z48.89 POSTOPERATIVE VISIT: Primary | ICD-10-CM

## 2025-02-13 DIAGNOSIS — K21.9 GASTROESOPHAGEAL REFLUX DISEASE, UNSPECIFIED WHETHER ESOPHAGITIS PRESENT: ICD-10-CM

## 2025-02-13 DIAGNOSIS — K91.2 POSTSURGICAL MALABSORPTION: ICD-10-CM

## 2025-02-13 PROCEDURE — 99024 POSTOP FOLLOW-UP VISIT: CPT | Performed by: STUDENT IN AN ORGANIZED HEALTH CARE EDUCATION/TRAINING PROGRAM

## 2025-02-13 PROCEDURE — RECHECK: Performed by: DIETITIAN, REGISTERED

## 2025-02-13 NOTE — PROGRESS NOTES
POST OP UP VISIT - BARIATRIC SURGERY  Tigist Dave 69 y.o. female MRN: [id number]  Unit/Bed#:  Encounter: [id number]      HPI:  Tigist Dave is a 69 y.o. female status post robotic PEHR with mesh performed by Dr. Cameron Jiménez on 2/4/2025 returning to office for first post op visit since surgery.    Tolerating diet.  Denies nausea and vomiting.  Taking PPI daily.      Review of Systems   Constitutional:  Negative for chills and fever.   HENT:  Negative for ear pain and sore throat.    Eyes:  Negative for pain and visual disturbance.   Respiratory:  Negative for cough and shortness of breath.    Cardiovascular:  Negative for chest pain and palpitations.   Gastrointestinal:  Negative for abdominal pain and vomiting.   Genitourinary:  Negative for dysuria and hematuria.   Musculoskeletal:  Negative for arthralgias and back pain.   Skin:  Negative for color change and rash.   Neurological:  Negative for seizures and syncope.   All other systems reviewed and are negative.      Historical Information   Past Medical History:   Diagnosis Date    Allergies     Asthma     had since childhood    Colon polyp     Crohn's disease (HCC)     GERD (gastroesophageal reflux disease) 2016    take an otc tablet at night    Hx of acute bronchitis     Hyperlipidemia      Past Surgical History:   Procedure Laterality Date    COLONOSCOPY      ESOPHAGEAL MANOMETRY      GASTRIC BYPASS LAPAROSCOPIC      HYSTERECTOMY Bilateral     35 years old    OOPHORECTOMY Bilateral     35 years old    [place] LAPS RPR PARAESPHGL HRNA INCL FUNDPLSTY W/MESH N/A 2/4/2025    Procedure: REPAIR HERNIA PARAESOPHAGEAL  W ROBOTICS;  Surgeon: Papa Jiménez MD;  Location: [facility] OR;  Service: Bariatrics    TONSILLECTOMY       Social History   Social History     Substance and Sexual Activity   Alcohol Use Not Currently     Social History     Substance and Sexual Activity   Drug Use Never     Social History     Tobacco Use   Smoking Status Never   Smokeless Tobacco Never  "    Family History:   Family History   Problem Relation Age of Onset    Coronary artery disease Mother     Heart attack Mother     Diabetes Father     Heart attack Father     Pancreatic cancer Sister 52    Cancer Sister     No Known Problems Maternal Grandmother     No Known Problems Maternal Grandfather     No Known Problems Paternal Grandmother     No Known Problems Paternal Grandfather     No Known Problems Sister     No Known Problems Maternal Aunt     No Known Problems Maternal Aunt     No Known Problems Maternal Aunt     No Known Problems Maternal Aunt     No Known Problems Maternal Aunt     No Known Problems Maternal Aunt     No Known Problems Paternal Aunt     No Known Problems Paternal Aunt     Breast cancer Cousin     Breast cancer Cousin 50    Breast cancer Cousin 68    Asthma Brother     Colon cancer Neg Hx        Meds/Allergies   PTA meds:   Cannot display prior to admission medications because the patient has not been admitted in this contact.     Allergies   Allergen Reactions    Nsaids Other (See Comments)     Hx bariatric surgery    Biaxin [Clarithromycin] Rash       Objective       Current Vitals:   Blood Pressure: 110/70 (02/13/25 1103)  Pulse: 79 (02/13/25 1103)  Temperature: 97.8 °F (36.6 °C) (02/13/25 1103)  Temp Source: Tympanic (02/13/25 1103)  Height: 5' 1\" (154.9 cm) (02/13/25 1103)  Weight - Scale: 81.6 kg (180 lb) (02/13/25 1103)      Invasive Devices       None                   Physical Exam  Vitals reviewed.   Constitutional:       General: She is not in acute distress.     Appearance: Normal appearance. She is not ill-appearing.   HENT:      Head: Normocephalic and atraumatic.      Nose: Nose normal.      Mouth/Throat:      Mouth: Mucous membranes are moist.      Pharynx: Oropharynx is clear.   Eyes:      Extraocular Movements: Extraocular movements intact.      Conjunctiva/sclera: Conjunctivae normal.   Cardiovascular:      Rate and Rhythm: Normal rate.   Pulmonary:      Effort: " Pulmonary effort is normal. No respiratory distress.   Abdominal:      General: There is no distension.      Palpations: Abdomen is soft.      Tenderness: There is no abdominal tenderness. There is no guarding or rebound.      Comments: Incisions appear clean/dry/intact without evidence of local infection, bleeding, or drainage. Minor reaction to skin glue   Musculoskeletal:         General: No deformity. Normal range of motion.      Cervical back: Normal range of motion and neck supple.   Skin:     General: Skin is warm and dry.      Coloration: Skin is not jaundiced.   Neurological:      General: No focal deficit present.      Mental Status: She is alert and oriented to person, place, and time.   Psychiatric:         Mood and Affect: Mood normal.         Thought Content: Thought content normal.             Assessment/PLAN:    69 y.o. female status post robotic PEHR with mesh done on 2/4/2025 by Dr. Cameron Jiménez, doing well post op. No major issues and healing well.       Increase physical activity slowly as tolerated and instructed.  Advance diet as instructed by our dietitians today and as indicated in the binder.  Continue PPI    Follow up in 3 months as scheduled.          Elvis Toure MD  Bariatric Surgery   2/13/2025  11:26 AM      .

## 2025-02-13 NOTE — PROGRESS NOTES
Date of surgery: 2/4/2025  Procedure:  PEHR   Performing surgeon: Dr Charlee Jiménez      Initial Weight - 196 lb  Current Weight -180lb  Milo Weight - now  Total Body Weight Loss (EWL)- 19%  EWL% - 29%  TWB % -10%

## 2025-04-08 ENCOUNTER — TELEPHONE (OUTPATIENT)
Dept: GASTROENTEROLOGY | Facility: CLINIC | Age: 70
End: 2025-04-08

## 2025-04-08 NOTE — TELEPHONE ENCOUNTER
Pt is due for a colonoscopy for colitis with Dr Brand. I lmom for pt to please call back to schedule. Recall letter mailed.

## 2025-04-11 ENCOUNTER — APPOINTMENT (EMERGENCY)
Dept: RADIOLOGY | Facility: HOSPITAL | Age: 70
End: 2025-04-11
Payer: COMMERCIAL

## 2025-04-11 ENCOUNTER — HOSPITAL ENCOUNTER (EMERGENCY)
Facility: HOSPITAL | Age: 70
Discharge: HOME | End: 2025-04-11
Attending: EMERGENCY MEDICINE
Payer: COMMERCIAL

## 2025-04-11 VITALS
RESPIRATION RATE: 18 BRPM | DIASTOLIC BLOOD PRESSURE: 64 MMHG | HEART RATE: 74 BPM | SYSTOLIC BLOOD PRESSURE: 115 MMHG | TEMPERATURE: 98.1 F | OXYGEN SATURATION: 96 %

## 2025-04-11 DIAGNOSIS — R07.9 CHEST PAIN: Primary | ICD-10-CM

## 2025-04-11 LAB
ALBUMIN SERPL BCG-MCNC: 4 G/DL (ref 3.5–5)
ALP SERPL-CCNC: 130 U/L (ref 34–104)
ALT SERPL W P-5'-P-CCNC: 11 U/L (ref 7–52)
ANION GAP SERPL CALCULATED.3IONS-SCNC: 7 MMOL/L (ref 4–13)
AST SERPL W P-5'-P-CCNC: 25 U/L (ref 13–39)
BASOPHILS # BLD AUTO: 0.09 THOUSANDS/ÂΜL (ref 0–0.1)
BASOPHILS NFR BLD AUTO: 1 % (ref 0–1)
BILIRUB SERPL-MCNC: 0.37 MG/DL (ref 0.2–1)
BUN SERPL-MCNC: 10 MG/DL (ref 5–25)
CALCIUM SERPL-MCNC: 9.2 MG/DL (ref 8.4–10.2)
CARDIAC TROPONIN I PNL SERPL HS: <2 NG/L (ref ?–50)
CHLORIDE SERPL-SCNC: 109 MMOL/L (ref 96–108)
CO2 SERPL-SCNC: 27 MMOL/L (ref 21–32)
CREAT SERPL-MCNC: 0.56 MG/DL (ref 0.6–1.3)
EOSINOPHIL # BLD AUTO: 0.39 THOUSAND/ÂΜL (ref 0–0.61)
EOSINOPHIL NFR BLD AUTO: 6 % (ref 0–6)
ERYTHROCYTE [DISTWIDTH] IN BLOOD BY AUTOMATED COUNT: 15.3 % (ref 11.6–15.1)
GFR SERPL CREATININE-BSD FRML MDRD: 94 ML/MIN/1.73SQ M
GLUCOSE SERPL-MCNC: 102 MG/DL (ref 65–140)
HCT VFR BLD AUTO: 44.2 % (ref 34.8–46.1)
HGB BLD-MCNC: 13.8 G/DL (ref 11.5–15.4)
IMM GRANULOCYTES # BLD AUTO: 0.01 THOUSAND/UL (ref 0–0.2)
IMM GRANULOCYTES NFR BLD AUTO: 0 % (ref 0–2)
LIPASE SERPL-CCNC: 29 U/L (ref 11–82)
LYMPHOCYTES # BLD AUTO: 2.61 THOUSANDS/ÂΜL (ref 0.6–4.47)
LYMPHOCYTES NFR BLD AUTO: 37 % (ref 14–44)
MCH RBC QN AUTO: 26.8 PG (ref 26.8–34.3)
MCHC RBC AUTO-ENTMCNC: 31.2 G/DL (ref 31.4–37.4)
MCV RBC AUTO: 86 FL (ref 82–98)
MONOCYTES # BLD AUTO: 0.6 THOUSAND/ÂΜL (ref 0.17–1.22)
MONOCYTES NFR BLD AUTO: 8 % (ref 4–12)
NEUTROPHILS # BLD AUTO: 3.41 THOUSANDS/ÂΜL (ref 1.85–7.62)
NEUTS SEG NFR BLD AUTO: 48 % (ref 43–75)
NRBC BLD AUTO-RTO: 0 /100 WBCS
PLATELET # BLD AUTO: 293 THOUSANDS/UL (ref 149–390)
PMV BLD AUTO: 9.6 FL (ref 8.9–12.7)
POTASSIUM SERPL-SCNC: 3.8 MMOL/L (ref 3.5–5.3)
PROT SERPL-MCNC: 6.9 G/DL (ref 6.4–8.4)
RBC # BLD AUTO: 5.15 MILLION/UL (ref 3.81–5.12)
SODIUM SERPL-SCNC: 143 MMOL/L (ref 135–147)
WBC # BLD AUTO: 7.11 THOUSAND/UL (ref 4.31–10.16)

## 2025-04-11 PROCEDURE — 99285 EMERGENCY DEPT VISIT HI MDM: CPT

## 2025-04-11 PROCEDURE — 85025 COMPLETE CBC W/AUTO DIFF WBC: CPT

## 2025-04-11 PROCEDURE — 84484 ASSAY OF TROPONIN QUANT: CPT

## 2025-04-11 PROCEDURE — 99285 EMERGENCY DEPT VISIT HI MDM: CPT | Performed by: EMERGENCY MEDICINE

## 2025-04-11 PROCEDURE — 93005 ELECTROCARDIOGRAM TRACING: CPT

## 2025-04-11 PROCEDURE — 71045 X-RAY EXAM CHEST 1 VIEW: CPT

## 2025-04-11 PROCEDURE — 80053 COMPREHEN METABOLIC PANEL: CPT

## 2025-04-11 PROCEDURE — 83690 ASSAY OF LIPASE: CPT

## 2025-04-11 PROCEDURE — 36415 COLL VENOUS BLD VENIPUNCTURE: CPT

## 2025-04-11 NOTE — ED PROVIDER NOTES
Time reflects when diagnosis was documented in both MDM as applicable and the Disposition within this note       Time User Action Codes Description Comment    4/11/2025  3:10 PM Clarita Shane Add [R07.9] Chest pain           ED Disposition       ED Disposition   Discharge    Condition   Stable    Date/Time   Fri Apr 11, 2025  3:10 PM    Comment   Tigist Dave discharge to home/self care.                   Assessment & Plan       Medical Decision Making  Amount and/or Complexity of Data Reviewed  Labs: ordered. Decision-making details documented in ED Course.  Radiology: ordered. Decision-making details documented in ED Course.    See ED course for MDM.      ED Course as of 04/11/25 2037 Fri Apr 11, 2025   1420 DDx includes but not limited to:  GERD, ACS, post op complication   1421 Procedure Note: EKG  Date/Time: 04/11/25 2:21 PM   Interpreted by: Clarita Shane MD  Indications / Diagnosis: CP  ECG reviewed by me, the ED Physician: yes   The EKG demonstrates:  Rhythm: normal sinus  Rate: 91  Intervals: normal intervals  Axis: normal axis  QRS/Blocks: normal QRS  ST Changes: No acute ST Changes, no STD/PRICILA.     1455 hs TnI 0hr: <2   1456 XR chest 1 view portable  No acute cardiopulmonary disease.   1511 Re-evaluated patient, reports pain is completely resolved.  Since patient's pain has been ongoing since 4:30 AM even though it has been intermittent, character has been exactly the same.  Low suspicion for ACS as pain is intermittent, not associated with exertion, reproducible with food intake.  Offered collecting the 2-hour troponin for comparison, however patient declines and would like to go home since her pain is completely resolved.  Heart score 4, will refer patient for cardiology eval.  Patient will need to follow-up with her GI doctor for further management as well as her PCP.  Return precautions discussed such as shortness of breath on exertion, chest pain or any other concerning symptoms.  Patient voices  understanding agrees with plan.  All questions and concerns addressed at this time.       Medications - No data to display    ED Risk Strat Scores   HEART Risk Score      Flowsheet Row Most Recent Value   Heart Score Risk Calculator    History 1 Filed at: 04/11/2025 1511   ECG 0 Filed at: 04/11/2025 1511   Age 2 Filed at: 04/11/2025 1511   Risk Factors 1 Filed at: 04/11/2025 1511   Troponin 0 Filed at: 04/11/2025 1511   HEART Score 4 Filed at: 04/11/2025 1511          HEART Risk Score      Flowsheet Row Most Recent Value   Heart Score Risk Calculator    History 1 Filed at: 04/11/2025 1511   ECG 0 Filed at: 04/11/2025 1511   Age 2 Filed at: 04/11/2025 1511   Risk Factors 1 Filed at: 04/11/2025 1511   Troponin 0 Filed at: 04/11/2025 1511   HEART Score 4 Filed at: 04/11/2025 1511                      No data recorded                            History of Present Illness       Chief Complaint   Patient presents with    Chest Pain     Patient reports she was woken  up from sleep around 0430 with chest discomfort, reports discomfort with taking a deep breath       Past Medical History:   Diagnosis Date    Allergies     Asthma     had since childhood    Colon polyp     Crohn's disease (HCC)     GERD (gastroesophageal reflux disease) 2016    take an otc tablet at night    Hx of acute bronchitis     Hyperlipidemia       Past Surgical History:   Procedure Laterality Date    COLONOSCOPY      ESOPHAGEAL MANOMETRY      GASTRIC BYPASS LAPAROSCOPIC      HYSTERECTOMY Bilateral     35 years old    OOPHORECTOMY Bilateral     35 years old    [place] LAPS RPR PARAESPHGL HRNA INCL FUNDPLSTY W/MESH N/A 2/4/2025    Procedure: REPAIR HERNIA PARAESOPHAGEAL  W ROBOTICS;  Surgeon: Papa Jiménez MD;  Location: [facility];  Service: Bariatrics    TONSILLECTOMY        Family History   Problem Relation Age of Onset    Coronary artery disease Mother     Heart attack Mother     Diabetes Father     Heart attack Father     Pancreatic cancer Sister 52     "Cancer Sister     No Known Problems Maternal Grandmother     No Known Problems Maternal Grandfather     No Known Problems Paternal Grandmother     No Known Problems Paternal Grandfather     No Known Problems Sister     No Known Problems Maternal Aunt     No Known Problems Maternal Aunt     No Known Problems Maternal Aunt     No Known Problems Maternal Aunt     No Known Problems Maternal Aunt     No Known Problems Maternal Aunt     No Known Problems Paternal Aunt     No Known Problems Paternal Aunt     Breast cancer Cousin     Breast cancer Cousin 50    Breast cancer Cousin 68    Asthma Brother     Colon cancer Neg Hx       Social History     Tobacco Use    Smoking status: Never    Smokeless tobacco: Never   Vaping Use    Vaping status: Never Used   Substance Use Topics    Alcohol use: Not Currently    Drug use: Never      E-Cigarette/Vaping    E-Cigarette Use Never User       E-Cigarette/Vaping Substances    Nicotine No     THC No     CBD No     Flavoring No     Other No     Unknown No       I have reviewed and agree with the history as documented.     HPI  Pt is a 70 yo F w/ pmh of HLD, recent hiatal hernia repair on 2/4/25, presenting for chest discomfort onset 4:30 AM today.  Patient woke up out of sleep with the pain, pain has been intermittent, described as \"pressure,\" with radiation from jaw to chest.  Associated with chest tightness, denies nausea/vomiting, not associated with ambulation.  Patient reports that last night she ate roast beef sandwich with fries which is more than what she would normally eat.  He denies any aspiration/choking.  Has been able to tolerate p.o. fluids without vomiting today.  Denies fevers, chills, weakness/numbness of extremities, or any other symptoms at this time.      Review of Systems  As per HPI      Objective       ED Triage Vitals [04/11/25 1352]   Temperature Pulse Blood Pressure Respirations SpO2 Patient Position - Orthostatic VS   98.1 °F (36.7 °C) 96 155/80 18 98 % " Sitting      Temp Source Heart Rate Source BP Location FiO2 (%) Pain Score    Oral -- Right arm -- 5      Vitals      Date and Time Temp Pulse SpO2 Resp BP Pain Score FACES Pain Rating User   04/11/25 1500 -- 74 96 % -- 115/64 -- -- LAB   04/11/25 1352 98.1 °F (36.7 °C) 96 98 % 18 155/80 5 -- NR            Physical Exam  Vitals and nursing note reviewed.   Constitutional:       General: She is not in acute distress.     Appearance: She is not ill-appearing, toxic-appearing or diaphoretic.   HENT:      Head: Normocephalic and atraumatic.      Right Ear: External ear normal.      Left Ear: External ear normal.      Nose: Nose normal.      Mouth/Throat:      Mouth: Mucous membranes are moist.   Eyes:      General: No scleral icterus.     Extraocular Movements: Extraocular movements intact.      Conjunctiva/sclera: Conjunctivae normal.   Cardiovascular:      Rate and Rhythm: Normal rate and regular rhythm.      Pulses: Normal pulses.           Radial pulses are 2+ on the right side and 2+ on the left side.        Dorsalis pedis pulses are 2+ on the right side and 2+ on the left side.      Heart sounds: Normal heart sounds, S1 normal and S2 normal. No murmur heard.  Pulmonary:      Effort: Pulmonary effort is normal. No respiratory distress.      Breath sounds: Normal breath sounds. No stridor. No wheezing.   Abdominal:      Palpations: Abdomen is soft.      Tenderness: There is no abdominal tenderness.   Musculoskeletal:         General: Normal range of motion.      Cervical back: Normal range of motion.   Skin:     General: Skin is warm and dry.   Neurological:      General: No focal deficit present.      Mental Status: She is alert and oriented to person, place, and time.   Psychiatric:         Mood and Affect: Mood normal.         Results Reviewed       Procedure Component Value Units Date/Time    HS Troponin 0hr (reflex protocol) [[id number]]  (Normal) Collected: 04/11/25 1416    Lab Status: Final result Specimen:  Blood from Arm, Left Updated: 04/11/25 1452     hs TnI 0hr <2 ng/L     Comprehensive metabolic panel [[id number]]  (Abnormal) Collected: 04/11/25 1416    Lab Status: Final result Specimen: Blood from Arm, Left Updated: 04/11/25 1444     Sodium 143 mmol/L      Potassium 3.8 mmol/L      Chloride 109 mmol/L      CO2 27 mmol/L      ANION GAP 7 mmol/L      BUN 10 mg/dL      Creatinine 0.56 mg/dL      Glucose 102 mg/dL      Calcium 9.2 mg/dL      AST 25 U/L      ALT 11 U/L      Alkaline Phosphatase 130 U/L      Total Protein 6.9 g/dL      Albumin 4.0 g/dL      Total Bilirubin 0.37 mg/dL      eGFR 94 ml/min/1.73sq m     Narrative:      National Kidney Disease Foundation guidelines for Chronic Kidney Disease (CKD):     Stage 1 with normal or high GFR (GFR > 90 mL/min/1.73 square meters)    Stage 2 Mild CKD (GFR = 60-89 mL/min/1.73 square meters)    Stage 3A Moderate CKD (GFR = 45-59 mL/min/1.73 square meters)    Stage 3B Moderate CKD (GFR = 30-44 mL/min/1.73 square meters)    Stage 4 Severe CKD (GFR = 15-29 mL/min/1.73 square meters)    Stage 5 End Stage CKD (GFR <15 mL/min/1.73 square meters)  Note: GFR calculation is accurate only with a steady state creatinine    Lipase [[id number]]  (Normal) Collected: 04/11/25 1416    Lab Status: Final result Specimen: Blood from Arm, Left Updated: 04/11/25 1444     Lipase 29 u/L     CBC and differential [[id number]]  (Abnormal) Collected: 04/11/25 1416    Lab Status: Final result Specimen: Blood from Arm, Left Updated: 04/11/25 1430     WBC 7.11 Thousand/uL      RBC 5.15 Million/uL      Hemoglobin 13.8 g/dL      Hematocrit 44.2 %      MCV 86 fL      MCH 26.8 pg      MCHC 31.2 g/dL      RDW 15.3 %      MPV 9.6 fL      Platelets 293 Thousands/uL      nRBC 0 /100 WBCs      Segmented % 48 %      Immature Grans % 0 %      Lymphocytes % 37 %      Monocytes % 8 %      Eosinophils Relative 6 %      Basophils Relative 1 %      Absolute Neutrophils 3.41 Thousands/µL      Absolute Immature Grans  0.01 Thousand/uL      Absolute Lymphocytes 2.61 Thousands/µL      Absolute Monocytes 0.60 Thousand/µL      Eosinophils Absolute 0.39 Thousand/µL      Basophils Absolute 0.09 Thousands/µL             XR chest 1 view portable   Final Interpretation by Elma Lovelace MD (04/11 3909)      No acute cardiopulmonary disease.            Workstation performed: [id number]             Procedures    ED Medication and Procedure Management   Prior to Admission Medications   Prescriptions Last Dose Informant Patient Reported? Taking?   Coenzyme Q10 (CO Q 10 PO)   Yes No   Sig: Take by mouth in the morning   Multiple Vitamins-Iron (One Daily Multivitamin/Iron) TABS   Yes No   Sig: Take by mouth in the morning   Omega-3 Fatty Acids (FISH OIL PO)  Self Yes No   Sig: Take by mouth in the morning   omeprazole (PriLOSEC) 20 mg delayed release capsule   No No   Sig: Take 1 capsule (20 mg total) by mouth daily   rosuvastatin (CRESTOR) 10 MG tablet  Self Yes No   Sig: Take 10 mg by mouth every evening      Facility-Administered Medications: None     Discharge Medication List as of 4/11/2025  3:12 PM        CONTINUE these medications which have NOT CHANGED    Details   Coenzyme Q10 (CO Q 10 PO) Take by mouth in the morning, Historical Med      Multiple Vitamins-Iron (One Daily Multivitamin/Iron) TABS Take by mouth in the morning, Historical Med      Omega-3 Fatty Acids (FISH OIL PO) Take by mouth in the morning, Historical Med      omeprazole (PriLOSEC) 20 mg delayed release capsule Take 1 capsule (20 mg total) by mouth daily, Starting Fri 1/31/2025, Normal      rosuvastatin (CRESTOR) 10 MG tablet Take 10 mg by mouth every evening, Starting Mon 11/29/2021, Historical Med             ED SEPSIS DOCUMENTATION   Time reflects when diagnosis was documented in both MDM as applicable and the Disposition within this note       Time User Action Codes Description Comment    4/11/2025  3:10 PM Clarita Shane Add [R07.9] Chest pain                  Clarita  MD Acosta  04/11/25 2033

## 2025-04-11 NOTE — DISCHARGE INSTRUCTIONS
Please return to the ER if you have worsening chest pain or shortness of breath with exertion.    A referral for cardiology has been placed for you, please follow up with them as well as your primary care provider.

## 2025-04-11 NOTE — ED PROCEDURE NOTE
PROCEDURE  ECG 12 Lead Documentation Only    Date/Time: 4/11/2025 1:58 PM    Performed by: Mykel Wu MD  Authorized by: Mykel Wu MD    Indications / Diagnosis:  Cp-  ECG reviewed by me, the ED Provider: yes    Patient location:  ED  Previous ECG:     Previous ECG:  Unavailable    Comparison to cardiac monitor: Yes    Interpretation:     Interpretation: non-specific    Rate:     ECG rate:  91    ECG rate assessment: normal    Rhythm:     Rhythm: sinus rhythm    Ectopy:     Ectopy: none    QRS:     QRS axis:  Normal    QRS intervals:  Normal  Conduction:     Conduction: normal    ST segments:     ST segments:  Normal  T waves:     T waves: flattening      Flattening:  III, aVF and V1  Other findings:     Other findings: U wave    Comments:      No ecg signs of ischemia/ injury / r heart strain / landy/pericarditis        Mykel Wu MD  04/11/25 1400

## 2025-04-12 NOTE — ED ATTENDING ATTESTATION
4/11/2025  IMykel MD, saw and evaluated the patient. I have discussed the patient with the resident/non-physician practitioner and agree with the resident's/non-physician practitioner's findings, Plan of Care, and MDM as documented in the resident's/non-physician practitioner's note, except where noted. All available labs and Radiology studies were reviewed.  I was present for key portions of any procedure(s) performed by the resident/non-physician practitioner and I was immediately available to provide assistance.       At this point I agree with the current assessment done in the Emergency Department.  I have conducted an independent evaluation of this patient a history and physical is as follows:SEE H AND P ABOVE- AGREE WITH ER RESIDENT TX PLAN - DISPO     ED Course  ED Course as of 04/11/25 2213 Fri Apr 11, 2025   1413 Er md note- 2/5/25 labs reviewed by er md -- recent imaging-2/5/25 post op note reviewed by er md   1432 Er md note- normal/ equal bue pulse ox pleth 95-98 % on ra with 3 perfusion dots    1502 Cxr portable- no old cxr for comparison -- poor inspir effort-  normal mediastinum/cardiac silhouette- no free/sq air - no infiltrate- ptx- pulm edema- pleural effusions   1506 Er md note- current lalbs reviewed and compared by er md   1519 Er md note- pt seen          Critical Care Time  Procedures

## 2025-04-13 LAB
ATRIAL RATE: 91 BPM
P AXIS: 47 DEGREES
PR INTERVAL: 138 MS
QRS AXIS: 7 DEGREES
QRSD INTERVAL: 74 MS
QT INTERVAL: 366 MS
QTC INTERVAL: 450 MS
T WAVE AXIS: 23 DEGREES
VENTRICULAR RATE: 91 BPM

## 2025-04-13 PROCEDURE — 93010 ELECTROCARDIOGRAM REPORT: CPT | Performed by: INTERNAL MEDICINE

## 2025-04-14 ENCOUNTER — TELEPHONE (OUTPATIENT)
Age: 70
End: 2025-04-14

## 2025-04-14 NOTE — TELEPHONE ENCOUNTER
Patient of Dr Cameron Jiménez s/p hiatal hernia repair in February.    She wanted to let Dr Cameron Jiménez know that she had en ED visit on Thursday due to some chest pain and stuck feeling she was having.  She feels she may have advanced her diet too quickly and had a cheesesteak and some fries.  She was cleared and discharged from ED.    She has had full resolution of symptoms at this time and is tolerating soft diet. She wanted to let provider know in case there is anything else she should do .    #[phone]

## 2025-05-16 ENCOUNTER — TELEPHONE (OUTPATIENT)
Age: 70
End: 2025-05-16

## 2025-05-16 NOTE — TELEPHONE ENCOUNTER
Patient called to reschedule appointment with Dr WHITAKER. Warm transferred to [facility] for further assistance.

## 2025-05-16 NOTE — TELEPHONE ENCOUNTER
Pt contacted office to reschedule an appt 5/14/25 at 11:30 am. Pt is ill and stated has bronchitis and could not make it to appt. Pt was rescheduled 6/11/25 at 9:15 am with Dr. Cameron Jiménez.

## 2025-05-22 ENCOUNTER — OFFICE VISIT (OUTPATIENT)
Dept: BARIATRICS | Facility: CLINIC | Age: 70
End: 2025-05-22

## 2025-05-22 VITALS
SYSTOLIC BLOOD PRESSURE: 122 MMHG | DIASTOLIC BLOOD PRESSURE: 82 MMHG | HEART RATE: 82 BPM | HEIGHT: 61 IN | TEMPERATURE: 97.8 F | WEIGHT: 191.5 LBS | BODY MASS INDEX: 36.15 KG/M2

## 2025-05-22 DIAGNOSIS — K44.9 HIATAL HERNIA: Primary | ICD-10-CM

## 2025-05-22 NOTE — PROGRESS NOTES
"POST-OP OFFICE VISIT - BARIATRIC SURGERY  Tigist Dave 69 y.o. female MRN: [id number]  Unit/Bed#:  Encounter: [id number]      HPI:  Tigist Dave is a 69 y.o. female Large paraesophageal hernia status post laparoscopic Doug-en-Y gastric bypass by Dr. Cameron Jiménez presents to office for 3-month post-op visit.    Subjective     Patient presents to the office for post-op visit.  Tolerating regular diet: Yes    Nausea/Vomiting/Constipation/Diarrhea: No  Tolerating PPI.    Chronic cough from bronchitis.    Review of Systems   Constitutional:  Negative for chills and fever.   HENT:  Negative for sore throat.    Eyes:  Negative for visual disturbance.   Respiratory:  Negative for cough and shortness of breath.    Gastrointestinal:  Negative for abdominal pain and vomiting.   Musculoskeletal:  Negative for arthralgias.   Skin:  Negative for color change and rash.   Neurological:  Negative for seizures and syncope.   All other systems reviewed and are negative.      Historical Information   Past Medical History[1]  Past Surgical History[2]  Social History   Social History     Substance and Sexual Activity   Alcohol Use Not Currently     Social History     Substance and Sexual Activity   Drug Use Never     Tobacco Use History[3]    Objective       Current Vitals:   Blood Pressure: 122/82 (05/22/25 1105)  Pulse: 82 (05/22/25 1105)  Temperature: 97.8 °F (36.6 °C) (05/22/25 1105)  Temp Source: Tympanic (05/22/25 1105)  Height: 5' 1\" (154.9 cm) (05/22/25 1105)  Weight - Scale: 86.9 kg (191 lb 8 oz) (05/22/25 1105)    Invasive Devices       None                   Physical Exam  Vitals reviewed.   Constitutional:       Appearance: Normal appearance.   HENT:      Head: Atraumatic.      Nose: Nose normal.     Cardiovascular:      Pulses: Normal pulses.   Pulmonary:      Effort: Pulmonary effort is normal.     Musculoskeletal:         General: Normal range of motion.     Neurological:      General: No focal deficit present. "     Psychiatric:         Behavior: Behavior normal.             Assessment/PLAN:    Tigist Dave is a 69 y.o. female Large paraesophageal hernia status post laparoscopic Doug-en-Y gastric bypass        Follow diet as discussed.      Follow vitamin and mineral recommendations as reviewed with you. Bariatric vitamins are highly recommended. Vitamins are important for a life-time to avoid low levels which can lead to other medical problems.  Exercise as tolerated.  Continue PPI treatment for 6 months postop, will discuss gradual wean in 3 months f/u.  If she cont to have a chronic cough and questions about recurrence, can consider UGI during next appt.  Call the office if you have any problems with abdominal pain especially associated with fever, chills, nausea, vomiting or any other concerns.    All Post-bariatric surgery patients should be aware that very small quantities of any alcohol can cause impairment and it is very possible not to feel the effect. The effect can be in the system for several hours and it is also a stomach irritant.  It is advised to AVOID alcohol, Nonsteroidal anti-inflammatory drugs (NSAIDS) and nicotine of all forms. Any of these can cause stomach irritation/pain.    Most, if not all, post-gastric surgery patients would benefit from having a regular counselor for at least 2 years post-op to help with need for multiple life-style changes. If you are interested in this and need help finding a regular counselor, you can also make a follow-up with our  to help you find one.    Follow-up in 3 MONTHS.  We kindly ask that you arrive 15 minutes before appointment time to allow for our staff to room you and check your vital signs and update your chart. We thank you for your patience at your visit.    Jus Rajan MD  Bariatric Surgery  5/22/2025  11:19 AM         [1]   Past Medical History:  Diagnosis Date    Allergic Adolescent age    Allergies     Asthma     had since childhood     Colon polyp     COPD (chronic obstructive pulmonary disease) (HCC)     usually have a bout every winter    Crohn's disease (HCC)     GERD (gastroesophageal reflux disease) 2016    take an otc tablet at night    Hx of acute bronchitis     Hyperlipidemia     Lactose intolerance 2016    Stomach disorder 1980    Crohns Disease    Varicella adolescent   [2]   Past Surgical History:  Procedure Laterality Date    COLONOSCOPY  2014    ENDOSCOPIC ULTRASOUND (UPPER)  2022    ESOPHAGEAL MANOMETRY      FLEXIBLE SIGMOIDOSCOPY  2022    GASTRIC BYPASS LAPAROSCOPIC      HYSTERECTOMY Bilateral     35 years old    OOPHORECTOMY Bilateral     35 years old    GA LAPS RPR PARAESPHGL HRNA INCL FUNDPLSTY W/MESH N/A 02/04/2025    Procedure: REPAIR HERNIA PARAESOPHAGEAL  W ROBOTICS;  Surgeon: Papa Jiménez MD;  Location: [facility];  Service: Bariatrics    TONSILLECTOMY     [3]   Social History  Tobacco Use   Smoking Status Never   Smokeless Tobacco Never

## 2025-05-22 NOTE — PROGRESS NOTES
Date of surgery: 2/4/25  Procedure: Foregut  Performing Surgeon: Dr. Cameron Jiménez    Initial Weight - 199 lbs   Current Weight - 191.5 lbs  Milo Weight - 180 lbs   Total Body Weight Loss (EWL)- 7.5  EWL% - 12%  TBW % - 4%

## 2025-06-19 ENCOUNTER — HOSPITAL ENCOUNTER (OUTPATIENT)
Dept: RADIOLOGY | Age: 70
Discharge: HOME | End: 2025-06-19
Payer: COMMERCIAL

## 2025-06-19 DIAGNOSIS — Z12.31 ENCOUNTER FOR SCREENING MAMMOGRAM FOR MALIGNANT NEOPLASM OF BREAST: ICD-10-CM

## 2025-06-19 PROCEDURE — 77063 BREAST TOMOSYNTHESIS BI: CPT

## 2025-06-19 PROCEDURE — 77067 SCR MAMMO BI INCL CAD: CPT

## (undated) DEVICE — VISIGI 3D®  CALIBRATION SYSTEM  SIZE 36FR SLEEVE/STD: Brand: BOEHRINGER® VISIGI 3D™ SLEEVE GASTRECTOMY CALIBRATION SYSTEM, SIZE 36FR

## (undated) DEVICE — DISPOSABLE OR TOWEL: Brand: CARDINAL HEALTH

## (undated) DEVICE — VIOLET BRAIDED (POLYGLACTIN 910), SYNTHETIC ABSORBABLE SUTURE: Brand: COATED VICRYL

## (undated) DEVICE — GLOVE SRG BIOGEL 7.5

## (undated) DEVICE — SUT MONOCRYL 4-0 PS-2 27 IN Y426H

## (undated) DEVICE — 3000CC GUARDIAN II: Brand: GUARDIAN

## (undated) DEVICE — SEAL

## (undated) DEVICE — TROCAR: Brand: KII FIOS FIRST ENTRY

## (undated) DEVICE — COLUMN DRAPE

## (undated) DEVICE — [HIGH FLOW INSUFFLATOR,  DO NOT USE IF PACKAGE IS DAMAGED,  KEEP DRY,  KEEP AWAY FROM SUNLIGHT,  PROTECT FROM HEAT AND RADIOACTIVE SOURCES.]: Brand: PNEUMOSURE

## (undated) DEVICE — DRAPE EQUIPMENT RF WAND

## (undated) DEVICE — CADIERE FORCEPS: Brand: ENDOWRIST

## (undated) DEVICE — TRAVELKIT CONTAINS FIRST STEP KIT (200ML EP-4 KIT) AND SOILED SCOPE BAG - 1 KIT: Brand: TRAVELKIT CONTAINS FIRST STEP KIT AND SOILED SCOPE BAG

## (undated) DEVICE — BLADELESS OBTURATOR: Brand: WECK VISTA

## (undated) DEVICE — MEGA SUTURECUT ND: Brand: ENDOWRIST

## (undated) DEVICE — WEBRIL 6 IN UNSTERILE

## (undated) DEVICE — ARM DRAPE

## (undated) DEVICE — 2000CC GUARDIAN II: Brand: GUARDIAN

## (undated) DEVICE — SCD SEQUENTIAL COMPRESSION COMFORT SLEEVE MEDIUM KNEE LENGTH: Brand: KENDALL SCD

## (undated) DEVICE — KIT, ROBOTIC BARIATRIC: Brand: CARDINAL HEALTH

## (undated) DEVICE — DECANTER: Brand: UNBRANDED

## (undated) DEVICE — PLUMEPEN PRO 10FT

## (undated) DEVICE — EXOFIN PRECISION PEN HIGH VISCOSITY TOPICAL SKIN ADHESIVE: Brand: EXOFIN PRECISION PEN, 1G

## (undated) DEVICE — SUT ETHIBOND 0 CT-1 30 IN X424H

## (undated) DEVICE — SURGICAL GOWN, XL SMARTSLEEVE: Brand: CONVERTORS

## (undated) DEVICE — URETERAL CATHETER ADAPTOR TIP